# Patient Record
Sex: MALE | Race: WHITE | NOT HISPANIC OR LATINO | URBAN - METROPOLITAN AREA
[De-identification: names, ages, dates, MRNs, and addresses within clinical notes are randomized per-mention and may not be internally consistent; named-entity substitution may affect disease eponyms.]

---

## 2020-07-04 ENCOUNTER — HOSPITAL ENCOUNTER (EMERGENCY)
Facility: CLINIC | Age: 55
Discharge: HOME | End: 2020-07-04
Attending: FAMILY MEDICINE | Admitting: FAMILY MEDICINE
Payer: COMMERCIAL

## 2020-07-04 VITALS
SYSTOLIC BLOOD PRESSURE: 163 MMHG | OXYGEN SATURATION: 97 % | RESPIRATION RATE: 18 BRPM | WEIGHT: 203 LBS | DIASTOLIC BLOOD PRESSURE: 113 MMHG | HEART RATE: 95 BPM | TEMPERATURE: 97.9 F

## 2020-07-04 DIAGNOSIS — G47.00 INSOMNIA, UNSPECIFIED TYPE: ICD-10-CM

## 2020-07-04 DIAGNOSIS — F32.1 CURRENT MODERATE EPISODE OF MAJOR DEPRESSIVE DISORDER WITHOUT PRIOR EPISODE (H): ICD-10-CM

## 2020-07-04 DIAGNOSIS — F41.9 ANXIETY: ICD-10-CM

## 2020-07-04 LAB
ALBUMIN SERPL-MCNC: 4 G/DL (ref 3.4–5)
ALP SERPL-CCNC: 70 U/L (ref 40–150)
ALT SERPL W P-5'-P-CCNC: 92 U/L (ref 0–70)
AMPHETAMINES UR QL SCN: NEGATIVE
ANION GAP SERPL CALCULATED.3IONS-SCNC: 5 MMOL/L (ref 3–14)
AST SERPL W P-5'-P-CCNC: 25 U/L (ref 0–45)
BARBITURATES UR QL: NEGATIVE
BASOPHILS # BLD AUTO: 0 10E9/L (ref 0–0.2)
BASOPHILS NFR BLD AUTO: 0.4 %
BENZODIAZ UR QL: NEGATIVE
BILIRUB SERPL-MCNC: 0.7 MG/DL (ref 0.2–1.3)
BUN SERPL-MCNC: 13 MG/DL (ref 7–30)
CALCIUM SERPL-MCNC: 9 MG/DL (ref 8.5–10.1)
CANNABINOIDS UR QL SCN: NEGATIVE
CHLORIDE SERPL-SCNC: 104 MMOL/L (ref 94–109)
CO2 SERPL-SCNC: 27 MMOL/L (ref 20–32)
COCAINE UR QL: NEGATIVE
CREAT SERPL-MCNC: 0.86 MG/DL (ref 0.66–1.25)
DIFFERENTIAL METHOD BLD: NORMAL
EOSINOPHIL # BLD AUTO: 0.1 10E9/L (ref 0–0.7)
EOSINOPHIL NFR BLD AUTO: 0.5 %
ERYTHROCYTE [DISTWIDTH] IN BLOOD BY AUTOMATED COUNT: 12.4 % (ref 10–15)
ETHANOL UR QL SCN: NEGATIVE
GFR SERPL CREATININE-BSD FRML MDRD: >90 ML/MIN/{1.73_M2}
GLUCOSE SERPL-MCNC: 104 MG/DL (ref 70–99)
HCT VFR BLD AUTO: 49.5 % (ref 40–53)
HGB BLD-MCNC: 17.1 G/DL (ref 13.3–17.7)
IMM GRANULOCYTES # BLD: 0 10E9/L (ref 0–0.4)
IMM GRANULOCYTES NFR BLD: 0.4 %
LYMPHOCYTES # BLD AUTO: 1.5 10E9/L (ref 0.8–5.3)
LYMPHOCYTES NFR BLD AUTO: 15.1 %
MCH RBC QN AUTO: 31.8 PG (ref 26.5–33)
MCHC RBC AUTO-ENTMCNC: 34.5 G/DL (ref 31.5–36.5)
MCV RBC AUTO: 92 FL (ref 78–100)
MONOCYTES # BLD AUTO: 0.7 10E9/L (ref 0–1.3)
MONOCYTES NFR BLD AUTO: 6.8 %
NEUTROPHILS # BLD AUTO: 7.8 10E9/L (ref 1.6–8.3)
NEUTROPHILS NFR BLD AUTO: 76.8 %
NRBC # BLD AUTO: 0 10*3/UL
NRBC BLD AUTO-RTO: 0 /100
OPIATES UR QL SCN: NEGATIVE
PLATELET # BLD AUTO: 275 10E9/L (ref 150–450)
POTASSIUM SERPL-SCNC: 4 MMOL/L (ref 3.4–5.3)
PROT SERPL-MCNC: 7.5 G/DL (ref 6.8–8.8)
RBC # BLD AUTO: 5.37 10E12/L (ref 4.4–5.9)
SODIUM SERPL-SCNC: 136 MMOL/L (ref 133–144)
TSH SERPL DL<=0.005 MIU/L-ACNC: 2.82 MU/L (ref 0.4–4)
WBC # BLD AUTO: 10.2 10E9/L (ref 4–11)

## 2020-07-04 PROCEDURE — 85025 COMPLETE CBC W/AUTO DIFF WBC: CPT | Performed by: FAMILY MEDICINE

## 2020-07-04 PROCEDURE — 80053 COMPREHEN METABOLIC PANEL: CPT | Performed by: FAMILY MEDICINE

## 2020-07-04 PROCEDURE — 90791 PSYCH DIAGNOSTIC EVALUATION: CPT

## 2020-07-04 PROCEDURE — 80307 DRUG TEST PRSMV CHEM ANLYZR: CPT | Performed by: FAMILY MEDICINE

## 2020-07-04 PROCEDURE — 99284 EMERGENCY DEPT VISIT MOD MDM: CPT | Mod: Z6 | Performed by: FAMILY MEDICINE

## 2020-07-04 PROCEDURE — 80320 DRUG SCREEN QUANTALCOHOLS: CPT | Performed by: FAMILY MEDICINE

## 2020-07-04 PROCEDURE — 36415 COLL VENOUS BLD VENIPUNCTURE: CPT

## 2020-07-04 PROCEDURE — 99285 EMERGENCY DEPT VISIT HI MDM: CPT | Mod: 25

## 2020-07-04 PROCEDURE — 84443 ASSAY THYROID STIM HORMONE: CPT | Performed by: FAMILY MEDICINE

## 2020-07-04 RX ORDER — ESCITALOPRAM OXALATE 10 MG/1
10 TABLET ORAL DAILY
COMMUNITY
End: 2021-09-10

## 2020-07-04 RX ORDER — ATORVASTATIN CALCIUM 20 MG/1
20 TABLET, FILM COATED ORAL DAILY
COMMUNITY

## 2020-07-04 RX ORDER — LISINOPRIL 10 MG/1
20 TABLET ORAL DAILY
COMMUNITY

## 2020-07-04 RX ORDER — LORAZEPAM 0.5 MG/1
0.5 TABLET ORAL 2 TIMES DAILY PRN
COMMUNITY
End: 2021-09-10

## 2020-07-04 RX ORDER — QUETIAPINE FUMARATE 50 MG/1
50 TABLET, FILM COATED ORAL
Qty: 30 TABLET | Refills: 0 | Status: SHIPPED | OUTPATIENT
Start: 2020-07-04 | End: 2021-09-10 | Stop reason: SINTOL

## 2020-07-04 NOTE — DISCHARGE INSTRUCTIONS
Thank you for choosing [facility].     Please closely monitor for further symptoms. Return to the Emergency Department if you develop any new or worsening signs or symptoms.    If you received any opiate pain medications or sedatives during your visit, please do not drive for at least 8 hours.     Labs, cultures or final xray interpretations may still need to be reviewed.  We will call you if your plan of care needs to be changed.    Please follow up with day treatment referral, therapy, and with your primary care physician or clinic.

## 2020-07-04 NOTE — ED PROVIDER NOTES
[facility] [place] EMERGENCY DEPARTMENT ([facility])    7/04/20        History     Chief Complaint   Patient presents with     Depression     lost job 2 months ago after 26 years.     HPI Craig Wilfahrt is a 54 year old male who presents to the Emergency Department for evaluation of anxiety, insomnia, depression, and weight loss.  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  Shortly after he started experiencing a lot of anxiety, restlessness, trouble sleeping, and some depressed mood.  He takes Tylenol PM which can help him sleep for about 4 hours at times.  He has trouble falling asleep, and with nighttime waking.  He has a lot of racing thoughts.  His appetite is poor, he his wife estimates he has lost 15 pounds.  Was treated in March for sudden sensorineural hearing loss with prednisone but became unable to continue due to side effects and has planned follow-up with ENT in that regard.    DEC :  Per DEC , the patient is here with his wife (who is a nurse by training).  The patient was successful in business and he recently lost a job at the end of April which was devastating for him.  He admits to associated increasing depression and anxiety after this, he also admits to insomnia, decrease in appetite, anhedonia, and increased difficulty concentrating.  The patient is anxious with groups of people as his extroverted needs are not being met.  He believes that they are talking about him, DEC  believes this is anxiety vs psychotic features.  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  He does drink 2-3 beers a day after work, but has not done so for a week and is not worried about alcoholism.  The patient endorses feeling very hopeless about his mental health and believes that if he could fix his insomnia the majority of his problems would go away.  The patient was seen by his PCP and started on Lexapro 5  days ago he is presently seeing an outpatient therapist weekly as well.  On June 30 they went to an urgent care in [place] and he was prescribed Ativan, after this he googled Ativan and got frightened of taking it so he has since stopped.  The patient presently denies any suicidal ideation, homicidal ideation, or hallucinations.    I have reviewed the Medications, Allergies, Past Medical and Surgical History, and Social History in the Epic system.  PAST MEDICAL HISTORY:   Past Medical History:   Diagnosis Date     Hypertension        PAST SURGICAL HISTORY:   Past Surgical History:   Procedure Laterality Date     GI SURGERY      appendix-1980s     ORTHOPEDIC SURGERY      right knee surgery, jaw fracture and right temporal plate       Past medical history, past surgical history, medications, and allergies were reviewed with the patient. Additional pertinent items: None    FAMILY HISTORY: History reviewed. No pertinent family history.    SOCIAL HISTORY:   Social History     Tobacco Use     Smoking status: Never Smoker     Smokeless tobacco: Never Used   Substance Use Topics     Alcohol use: Yes     Comment: occassional-last 5 days ago     Social history was reviewed with the patient. Additional pertinent items: None      Patient's Medications   New Prescriptions    QUETIAPINE (SEROQUEL) 50 MG TABLET    Take 1 tablet (50 mg) by mouth nightly as needed .  May take 2 tablets (100 mg) if needed.   Previous Medications    ATORVASTATIN (LIPITOR) 20 MG TABLET    Take 20 mg by mouth daily    ESCITALOPRAM (LEXAPRO) 10 MG TABLET    Take 10 mg by mouth daily    LISINOPRIL (ZESTRIL) 10 MG TABLET    Take 10 mg by mouth daily    LORAZEPAM (ATIVAN) 0.5 MG TABLET    Take 0.5 mg by mouth 2 times daily as needed for anxiety   Modified Medications    No medications on file   Discontinued Medications    No medications on file        No Known Allergies     Review of Systems  ROS: 14 point ROS neg other than the symptoms noted above in the  HPI.    Physical Exam   BP: (!) 163/113  Pulse: 95  Temp: 97.9  F (36.6  C)  Resp: 18  Weight: 92.1 kg (203 lb)  SpO2: 97 %      Physical Exam  Constitutional:       General: He is not in acute distress.     Appearance: He is not diaphoretic.   HENT:      Head: Atraumatic.   Eyes:      General: No scleral icterus.     Pupils: Pupils are equal, round, and reactive to light.   Cardiovascular:      Heart sounds: Normal heart sounds.   Pulmonary:      Effort: No respiratory distress.      Breath sounds: Normal breath sounds.   Abdominal:      General: Bowel sounds are normal.      Palpations: Abdomen is soft.      Tenderness: There is no abdominal tenderness.   Musculoskeletal:         General: No tenderness.   Skin:     General: Skin is warm.      Findings: No rash.   Psychiatric:         Attention and Perception: Attention normal.         Mood and Affect: Mood is anxious and depressed.         Speech: Speech normal.         Behavior: Behavior normal.         Thought Content: Thought content is not paranoid or delusional. Thought content does not include homicidal or suicidal ideation.         Cognition and Memory: Cognition normal.         Judgment: Judgment normal.         ED Course        Procedures                           Results for orders placed or performed during the hospital encounter of 07/04/20 (from the past 24 hour(s))   Comprehensive metabolic panel   Result Value Ref Range    Sodium 136 133 - 144 mmol/L    Potassium 4.0 3.4 - 5.3 mmol/L    Chloride 104 94 - 109 mmol/L    Carbon Dioxide 27 20 - 32 mmol/L    Anion Gap 5 3 - 14 mmol/L    Glucose 104 (H) 70 - 99 mg/dL    Urea Nitrogen 13 7 - 30 mg/dL    Creatinine 0.86 0.66 - 1.25 mg/dL    GFR Estimate >90 >60 mL/min/[1.73_m2]    GFR Estimate If Black >90 >60 mL/min/[1.73_m2]    Calcium 9.0 8.5 - 10.1 mg/dL    Bilirubin Total 0.7 0.2 - 1.3 mg/dL    Albumin 4.0 3.4 - 5.0 g/dL    Protein Total 7.5 6.8 - 8.8 g/dL    Alkaline Phosphatase 70 40 - 150 U/L    ALT  92 (H) 0 - 70 U/L    AST 25 0 - 45 U/L   CBC with platelets differential   Result Value Ref Range    WBC 10.2 4.0 - 11.0 10e9/L    RBC Count 5.37 4.4 - 5.9 10e12/L    Hemoglobin 17.1 13.3 - 17.7 g/dL    Hematocrit 49.5 40.0 - 53.0 %    MCV 92 78 - 100 fl    MCH 31.8 26.5 - 33.0 pg    MCHC 34.5 31.5 - 36.5 g/dL    RDW 12.4 10.0 - 15.0 %    Platelet Count 275 150 - 450 10e9/L    Diff Method Automated Method     % Neutrophils 76.8 %    % Lymphocytes 15.1 %    % Monocytes 6.8 %    % Eosinophils 0.5 %    % Basophils 0.4 %    % Immature Granulocytes 0.4 %    Nucleated RBCs 0 0 /100    Absolute Neutrophil 7.8 1.6 - 8.3 10e9/L    Absolute Lymphocytes 1.5 0.8 - 5.3 10e9/L    Absolute Monocytes 0.7 0.0 - 1.3 10e9/L    Absolute Eosinophils 0.1 0.0 - 0.7 10e9/L    Absolute Basophils 0.0 0.0 - 0.2 10e9/L    Abs Immature Granulocytes 0.0 0 - 0.4 10e9/L    Absolute Nucleated RBC 0.0    TSH with free T4 reflex   Result Value Ref Range    TSH 2.82 0.40 - 4.00 mU/L   Drug abuse screen 6 urine (chem dep)   Result Value Ref Range    Amphetamine Qual Urine Negative NEG^Negative    Barbiturates Qual Urine Negative NEG^Negative    Benzodiazepine Qual Urine Negative NEG^Negative    Cannabinoids Qual Urine Negative NEG^Negative    Cocaine Qual Urine Negative NEG^Negative    Ethanol Qual Urine Negative NEG^Negative    Opiates Qualitative Urine Negative NEG^Negative     Medications - No data to display          Assessments & Plan (with Medical Decision Making)   54-year-old male who has recently been diagnosed with depression and anxiety presenting now due to worsening mental health symptoms.  Multiple stressors including loss of job and increasing insomnia.  The patient was also seen by the [facility] , please refer to their extensive note/evaluation which was reviewed with me and is documented in EPIC on 7/4/2020 for further details.  In the ED he was cooperative, pleasant, and appeared medically stable.  Diagnostic studies unremarkable.   There are no substance abuse issues.  He endorses significant problems with anxiety and depressive symptoms.  Only recently started Lexapro, and as yet is unlikely to accurately reflect the potential effect of this medication.  He denies feeling suicidal or having self-harm thoughts or symptoms of psychosis.  He appears appropriate for outpatient management and a day treatment referral was made.  We will start Seroquel at bedtime for both sleep and anxiety.  Patient appears stable to be discharged home.  We discussed the indications for emergency department return and follow-up.  Stable for discharge.      I have reviewed the nursing notes.    I have reviewed the findings, diagnosis, plan and need for follow up with the patient.    New Prescriptions    QUETIAPINE (SEROQUEL) 50 MG TABLET    Take 1 tablet (50 mg) by mouth nightly as needed .  May take 2 tablets (100 mg) if needed.       Final diagnoses:   Current moderate episode of major depressive disorder without prior episode (H)   Anxiety   Insomnia, unspecified type       7/4/2020   [facility], [place], EMERGENCY DEPARTMENT    I, Mer Pope, am serving as a trained medical scribe to document services personally performed by Tim Powell MD, based on the provider's statements to me.     ITim MD, was physically present and have reviewed and verified the accuracy of this note documented by Mer oPpe.       Tim Powell MD  07/04/20 [id number]

## 2020-07-04 NOTE — ED TRIAGE NOTES
Pt has been feeling depressed and anxious for last 2 months. Insomnia as well. All started after loosing job 2 months.

## 2020-07-04 NOTE — ED AVS SNAPSHOT
[facility], [place], Emergency Department  [address]  [facility] [address]  Phone:  [phone]  Fax:  [phone]                                    Craig Wilfahrt   MRN: [id number]    Department:  [facility], Emergency Department   Date of Visit:  7/4/2020           After Visit Summary Signature Page    I have received my discharge instructions, and my questions have been answered. I have discussed any challenges I see with this plan with the nurse or doctor.    ..........................................................................................................................................  Patient/Patient Representative Signature      ..........................................................................................................................................  Patient Representative Print Name and Relationship to Patient    ..................................................               ................................................  Date                                   Time    ..........................................................................................................................................  Reviewed by Signature/Title    ...................................................              ..............................................  Date                                               Time          22EPIC Rev 08/18

## 2020-07-06 ENCOUNTER — HOSPITAL ENCOUNTER (OUTPATIENT)
Dept: BEHAVIORAL HEALTH | Facility: CLINIC | Age: 55
Discharge: HOME | End: 2020-07-06
Attending: PSYCHIATRY & NEUROLOGY | Admitting: PSYCHIATRY & NEUROLOGY
Payer: COMMERCIAL

## 2020-07-06 DIAGNOSIS — F43.23 ADJUSTMENT DISORDER WITH MIXED ANXIETY AND DEPRESSED MOOD: ICD-10-CM

## 2020-07-06 PROCEDURE — 90791 PSYCH DIAGNOSTIC EVALUATION: CPT | Mod: TEL | Performed by: PSYCHOLOGIST

## 2020-07-06 ASSESSMENT — COLUMBIA-SUICIDE SEVERITY RATING SCALE - C-SSRS
TOTAL  NUMBER OF ABORTED OR SELF INTERRUPTED ATTEMPTS PAST LIFETIME: NO
ATTEMPT PAST THREE MONTHS: NO
4. HAVE YOU HAD THESE THOUGHTS AND HAD SOME INTENTION OF ACTING ON THEM?: NO
TOTAL  NUMBER OF INTERRUPTED ATTEMPTS PAST 3 MONTHS: NO
6. HAVE YOU EVER DONE ANYTHING, STARTED TO DO ANYTHING, OR PREPARED TO DO ANYTHING TO END YOUR LIFE?: NO
2. HAVE YOU ACTUALLY HAD ANY THOUGHTS OF KILLING YOURSELF?: NO
TOTAL  NUMBER OF ABORTED OR SELF INTERRUPTED ATTEMPTS PAST 3 MONTHS: NO
2. HAVE YOU ACTUALLY HAD ANY THOUGHTS OF KILLING YOURSELF LIFETIME?: NO
1. IN THE PAST MONTH, HAVE YOU WISHED YOU WERE DEAD OR WISHED YOU COULD GO TO SLEEP AND NOT WAKE UP?: NO
3. HAVE YOU BEEN THINKING ABOUT HOW YOU MIGHT KILL YOURSELF?: NO
TOTAL  NUMBER OF INTERRUPTED ATTEMPTS LIFETIME: NO
ATTEMPT LIFETIME: NO
1. IN THE PAST MONTH, HAVE YOU WISHED YOU WERE DEAD OR WISHED YOU COULD GO TO SLEEP AND NOT WAKE UP?: NO
5. HAVE YOU STARTED TO WORK OUT OR WORKED OUT THE DETAILS OF HOW TO KILL YOURSELF? DO YOU INTEND TO CARRY OUT THIS PLAN?: NO
6. HAVE YOU EVER DONE ANYTHING, STARTED TO DO ANYTHING, OR PREPARED TO DO ANYTHING TO END YOUR LIFE?: NO
5. HAVE YOU STARTED TO WORK OUT OR WORKED OUT THE DETAILS OF HOW TO KILL YOURSELF? DO YOU INTEND TO CARRY OUT THIS PLAN?: NO
4. HAVE YOU HAD THESE THOUGHTS AND HAD SOME INTENTION OF ACTING ON THEM?: NO

## 2020-07-06 ASSESSMENT — ANXIETY QUESTIONNAIRES
2. NOT BEING ABLE TO STOP OR CONTROL WORRYING: SEVERAL DAYS
7. FEELING AFRAID AS IF SOMETHING AWFUL MIGHT HAPPEN: NOT AT ALL
1. FEELING NERVOUS, ANXIOUS, OR ON EDGE: SEVERAL DAYS
3. WORRYING TOO MUCH ABOUT DIFFERENT THINGS: MORE THAN HALF THE DAYS
IF YOU CHECKED OFF ANY PROBLEMS ON THIS QUESTIONNAIRE, HOW DIFFICULT HAVE THESE PROBLEMS MADE IT FOR YOU TO DO YOUR WORK, TAKE CARE OF THINGS AT HOME, OR GET ALONG WITH OTHER PEOPLE: SOMEWHAT DIFFICULT
GAD7 TOTAL SCORE: 7
6. BECOMING EASILY ANNOYED OR IRRITABLE: NOT AT ALL
5. BEING SO RESTLESS THAT IT IS HARD TO SIT STILL: SEVERAL DAYS

## 2020-07-06 ASSESSMENT — PATIENT HEALTH QUESTIONNAIRE - PHQ9
SUM OF ALL RESPONSES TO PHQ QUESTIONS 1-9: 13
5. POOR APPETITE OR OVEREATING: MORE THAN HALF THE DAYS

## 2020-07-06 NOTE — PROGRESS NOTES
"[facility]  Evaluator Name:  Hoda Mtz   Credentials:  PSYD HUMERA    PATIENT'S NAME: Craig Wilfahrt  PREFERRED NAME: Rehan  PREFERRED PRONOUNS: he/him/his     MRN:   [id number]  :   1965   ACCT. NUMBER: [id number]  DATE OF SERVICE: 20  START TIME: 1100  END TIME: 1230  PREFERRED PHONE: [phone]  May we leave a program related message: Yes  Service Modality:  Phone Visit:    The patient has been notified of the following:      \"We have found that certain health care needs can be provided without the need for a face to face visit.  This service lets us provide the care you need with a phone conversation.       I will have full access to your [place] medical record during this entire phone call.   I will be taking notes for your medical record.      Since this is like an office visit, we will bill your insurance company for this service.       There are potential benefits and risks of telephone visits (e.g. limits to patient confidentiality) that differ from in-person visits.?  Confidentiality still applies for telephone services, and nobody will record the visit.  It is important to be in a quiet, private space that is free of distractions (including cell phone or other devices) during the visit.??      If during the course of the call I believe a telephone visit is not appropriate, you will not be charged for this service\"     Consent has been obtained for this service by care team member: Yes     STANDARD ADULT DIAGNOSTIC ASSESSMENT      Identifying Information:  Patient is a 54 year old, .  The pronoun use throughout this assessment reflects the patient's chosen pronoun.  Patient was referred for an assessment by [facility].  Patient attended the session alone.     Chief Complaint:   The reason for seeking services at this time is: \" depression due to job loss 2 months ago, with that I have anxiety \"   The problem(s) began a few months ago,  Lost my job, was on " "prednisone for an ear nerve problem and was \"getting edgy\".  Not on the prednisone any longer. He said he lost a job of 26 years.  He said it was a shock and did not see it coming.  He said he was laid off with 300 other people. 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  He said he was not taking care of himself or chores at home.  He said he does not believe he was able to grieve the loss of his identity with his job.  He said he felt out of sorts and was not taking care of himself.   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    Patient has not attempted to resolve these concerns in the past.    Social/Family History:  Patient reported they grew up in [place].  They were raised by biological parents.   Mother passed away and father is still alive at age 95.  He said he has 5 siblings   Patient reported that he/him/his   childhood was great upbringing.  Father ran a business.  Patient described their current relationships with family of origin as good.  He said he has not reached out to them for support on this but feels like he could .      The patient describes their cultural background as .  Cultural influences and impact on patient's life structure, values, norms, and healthcare: grew up in [place].  Contextual influences on patient's health include: Family Factors good support from family.    These factors will be addressed in the Preliminary Treatment plan.  Patient identified their preferred language to be English. Patient reported they does not need the assistance of an  or other support involved in therapy.     Patient reported had no significant delays in developmental tasks.   Patient's highest " education level was graduate school. Master's Degree in Business Administration.  Patient identified the following learning problems: none reported.  Modifications will not be used to assist communication in therapy.   Patient reports they are  able to understand written materials.    Patient reported the following relationship history  1x.  Patient's current relationship status is  for 21.   Patient identified their sexual orientation as heterosexual.  Patient reported having two child(yuri). Daughter 18, son 16. Patient identified friends, spouse and neighbors as part of their support system.  Patient identified the quality of these relationships as stable and meaningful.      Patient's current living/housing situation involves staying in own home/apartment.  They live with wife and children and they report that housing is stable.     Patient is currently unemployed.  Patient reports their finances are obtained through spouse. Has a severance package. Patient does not identify finances as a current stressor.  Worries about not having a job and being out of a routine and losing connection with people.      Patient reported that they have not been involved with the legal system.   Patient denies being on probation / parole / under the jurisdiction of the court.        Patient's Strengths and Limitations:  Patient identified the following strengths or resources that will help them succeed in treatment: friends / good social support, family support, insight, intelligence and work ethic. Things that may interfere with the patient's success in treatment include: none identified.   _______________________________________________  Personal and Family Medical History:   Patient did report a family history of mental health concerns.  Patient reports family history includes Substance Abuse in his brother..     Patient reported the following previous diagnoses which include(s): none reported.  Patient reported  symptoms began a few months ago.   Patient has not received mental health services in the past: none.  Psychiatric Hospitalizations: None.  Patient denies a history of civil commitment.  Currently, patient is not receiving other mental health services.  These include none.   Patient has had a physical exam to rule out medical causes for current symptoms.  Date of last physical exam was within the past year. Client was encouraged to follow up with PCP if symptoms were to develop. The patient has a non-[place] Primary Care Provider. Their PCP is Dr. Keshav Trevizo  [facility]..  Patient reports the following current medical concerns: high blood pressure.nerve pain in ear.  There are significant appetite / nutritional concerns / weight changes. Has lost 16 pounds in the past 2 months.  Patient does not report a history of head injury / trauma / cognitive impairment.      Patient reports current meds as:   Outpatient Medications Marked as Taking for the 7/6/20 encounter (Hospital Encounter) with [facility]   Medication Sig     atorvastatin (LIPITOR) 20 MG tablet Take 20 mg by mouth daily     escitalopram (LEXAPRO) 10 MG tablet Take 10 mg by mouth daily     lisinopril (ZESTRIL) 10 MG tablet Take 10 mg by mouth daily     LORazepam (ATIVAN) 0.5 MG tablet Take 0.5 mg by mouth 2 times daily as needed for anxiety     QUEtiapine (SEROQUEL) 50 MG tablet Take 1 tablet (50 mg) by mouth nightly as needed .  May take 2 tablets (100 mg) if needed.       Medication Adherence:  Patient reports taking prescribed medications as prescribed.    Patient Allergies:  No Known Allergies    Medical History:    Past Medical History:   Diagnosis Date     Hypertension          Current Mental Status Exam:   Appearance:  Unable to assess due to telephone assessment  Eye Contact:  Unable to assess due to telephone assessment  Psychomotor:  Unable to assess due to telephone assessment      Gait / station:  Unable to assess due to  telephone assessment  Attitude / Demeanor: Cooperative   Speech      Rate / Production: Normal/ Responsive      Volume:  Normal  volume      Language:  no problems  Mood:   Anxious   Affect:   Unable to assess due to telephone assessment  Thought Content: Clear   Thought Process: Blocking  Goal Directed       Associations: No loosening of associations  Insight:   Good   Judgment:  Intact   Orientation:  All  Attention/concentration: Good    Rating Scales:    PHQ9:    PHQ-9 SCORE 7/6/2020   PHQ-9 Total Score 13   ;    GAD7:    JOSEPH-7 SCORE 7/6/2020   Total Score 7     CGI:     First:Considering your total clinical experience with this particular patient population, how severe are the patient's symptoms at this time?: 5 (7/6/2020 11:13 AM)  ;    Most recentCompared to the patient's condition at the START of treatment, this patient's condition is: 4 (7/6/2020 11:13 AM)      Substance Use:  Patient did report a family history of substance use concerns; see medical history section for details.  Patient has not received chemical dependency treatment in the past.  Patient has not ever been to detox.      Patient is not currently receiving any chemical dependency treatment. Patient reported the following problems as a result of their substance use: none reported.    Patient reports he was drinking 3-4 beers a day for the past few months. He said the beer was helping with anxiety but he has not drank in the past few weeks.    Patient denies using tobacco.  Patient reports marijuana use occasionally in the college.  None since college.  Patient reports 2-3 cups of coffee per day.  He said this has been the patter for years without disruption of sleep.  Patient reports using/abusing the following substance(s). Patient reported no other substance use.     CAGE- AID:   1. No  2. No  3. No  4. No  Substance Use: No symptoms    Based on the negative CAGE score and clinical interview there  are not indications of drug or alcohol  abuse.      Significant Losses / Trauma / Abuse / Neglect Issues:   Patient did not serve in the .  There are indications or report of significant loss, trauma, abuse or neglect issues related to: job loss job loss 2 months ago.  Concerns for possible neglect are not present.     Safety Assessment: Pt denies ever having suicidal thoughts.    Current Safety Concerns:  [place] Suicide Severity Rating Scale (Lifetime/Recent)  [place] Suicide Severity Rating (Lifetime/Recent) 7/6/2020   1. Wish to be Dead (Lifetime) No   1. Wish to be Dead (Recent) No   2. Non-Specific Active Suicidal Thoughts (Lifetime) No   2. Non-Specific Active Suicidal Thoughts (Recent) No   3. Active Suicidal Ideation with any Methods (Not Plan) Without Intent to Act (Lifetime) No   3. Active Sucidal Ideation with any Methods (Not Plan) Without Intent to Act (Recent) No   4. Active Suicidal Ideation with Some Intent to Act, Without Specific Plan (Lifetime) No   4. Active Suicidal Ideation with Some Intent to Act, Without Specific Plan (Recent) No   5. Active Suicidal Ideation with Specific Plan and Intent (Lifetime) No   5. Active Suicidal Ideation with Specific Plan and Intent (Recent) No   Most Severe Ideation Rating (Lifetime) NA   Frequency (Lifetime) NA   Duration (Lifetime) NA   Controllability (Lifetime) NA   Protective Factors  (Lifetime) NA   Reasons for Ideation (Lifetime) NA   Most Severe Ideation Rating (Past Month) NA   Frequency (Past Month) NA   Duration (Past Month) NA   Controllability (Past Month) NA   Protective Factors (Past Month) NA   Reasons for Ideation (Past Month) NA   Actual Attempt (Lifetime) No   Actual Attempt (Past 3 Months) No   Has subject engaged in non-suicidal self-injurious behavior? (Lifetime) No   Has subject engaged in non-suicidal self-injurious behavior? (Past 3 Months) No   Interrupted Attempts (Lifetime) No   Interrupted Attempts (Past 3 Months) No   Aborted or Self-Interrupted Attempt  (Lifetime) No   Aborted or Self-Interrupted Attempt (Past 3 Months) No   Preparatory Acts or Behavior (Lifetime) No   Preparatory Acts or Behavior (Past 3 Months) No     Patient denies current homicidal ideation and behaviors.  Patient denies current self-injurious ideation and behaviors.    Patient denied risk behaviors associated with substance use.  Patient denies any high risk behaviors associated with mental health symptoms.  Patient reports the following current concerns for their personal safety: None.  Patient reports there are firearms in the house. The firearms are secured in a locked space.     History of Safety Concerns:  Patient denied a history of homicidal ideation.     Patient denied a history of personal safety concerns.    Patient denied a history of assaultive behaviors.    Patient denied a history of sexual assault behaviors.     Patient denied a history of risk behaviors associated with substance use.  Patient denies any history of high risk behaviors associated with mental health symptoms.  Patient reports the following protective factors: dedication to family/friends, effective problem-solving skills and committment to well-being    Risk Plan:  See Preliminary Treatment Plan for Safety and Risk Management Plan     Review of Symptoms per patient report:  Depression: Change in sleep, Lack of interest, Change in energy level, Difficulties concentrating, Low self-worth and Feeling sad, down, or depressed  Chelo:  No Symptoms  Psychosis: No Symptoms  Anxiety: Excessive worry, Nervousness, Physical complaints, such as headaches, stomachaches, muscle tension, Sleep disturbance and Poor concentration  Panic:  No symptoms  Post Traumatic Stress Disorder:  No Symptoms   Eating Disorder: Weight change and said he has been losing weight since he lost his job--16 pounds in the last 2 months  he said part of it was the job he was working--high paced and just was not eating because he was so busy  ADD /  ADHD:  No symptoms  Conduct Disorder: No symptoms  Autism Spectrum Disorder: No symptoms  Obsessive Compulsive Disorder: No Symptoms    Patient reports the following compulsive behaviors and treatment history: none.      Diagnostic Criteria:   A. The development of emotional or behavioral symptoms in response to an identifiable stressor(s) occurring within 3 months of the onset of the stressor(s)  B. These symptoms or behaviors are clinically significant, as evidenced by one or both of the following:       - Significant impairment in social, occupational, or other important areas of functioning  C. The stress-related disturbance does not meet criteria for another disorder & is not not an exacerbation of another mental disorder  D. The symptoms do not represent normal bereavement  E. Once the stressor or its consequences have terminated, the symptoms do not persist for more than an additional 6 months       * Adjustment Disorder with Mixed Anxiety and Depressed Mood: The predominant manifestation is a combination of depression and anxiety    Functional Status:  Patient reports the following functional impairments: management of the household and or completion of tasks.     WHODAS:   WHODAS 2.0 Total Score 7/6/2020   Total Score 24       Clinical Summary:  1. Reason for assessment: Pt referred from [facility] for assessment after presenting for services in  ED  .  2. Psychosocial, Cultural and Contextual Factors: Lost job to lay off in last 2 months, decrease connection due to COVID  .  3. Principal DSM5 Diagnoses  (Sustained by DSM5 Criteria Listed Above):   Adjustment Disorders  309.28 (F43.23) With mixed anxiety and depressed mood.  4. Other Diagnoses that is relevant to services:   none  5. Provisional Diagnosis:  No other symptoms were reported during the assessment that would indicate alternate diagnoses.  Should symptoms arise during the course of treatment the diagnoses can be updated at that time.  6. Prognosis:  Return to Normal Functioning.  7. Likely consequences of symptoms if not treated: Without treatment patient more than likely will experience a continuation of symptoms with decreased daily functioning, requiring an increased level of care..  8. Client strengths include:  in the past confidence, leadership,  good communicator, considerate,  integrity.     Recommendations:     1. Plan for Safety and Risk Management:Recommended that patient call 911 or go to the local ED should there be a change in any of these risk factors..  Report to child / adult protection services was NA.     2. Patient identified no cultural or spiritual concerns for treatment services. Patient encouraged to ask for help should needs arise in the course of treatment.      3. Initial Treatment will focus on: Adjustment Difficulties related to: recent job loss.     4. Resources/Service Plan:       services are not indicated.     Modifications to assist communication are not indicated.     Additional disability accommodations are not indicated.      5. Collaboration:  Collaboration / coordination of treatment will be initiated with the following support professionals: outpatient therapist.    Pt was referred by [facility] for day treatment services.  Pt has no previous history of mental health services.  His problems started 2 months ago as a reaction to a stressor.  He is experiencing stress and grief due to job loss.  He would like to talk to someone to process the loss of his job and help keep him accountable to moving forward in his life.  He agreed that talking to someone individually to start his services would be helpful.  Should his symptoms not resolve he may benefit from outpatient day treatment services.  6.  Referrals:  The following referral(s) will be initiated: outpatient therapy. Next Scheduled Appointment: [facility].  A Release of Information has been obtained for the following: emergency contact.    7. HEAVEN: HEAVEN:  Discussed the  general effects of drugs and alcohol on health and well-being.     8. Records were reviewed at time of assessment.  Information in this assessment was obtained from the medical record and provided by patient who is a good historian.   Patient will have open access to their mental health medical record.      Eval type:  Mental Health    Staff Name/Credentials:  Hoda Mtz PSYD, LP  July 6, 2020

## 2020-07-07 ASSESSMENT — ANXIETY QUESTIONNAIRES: GAD7 TOTAL SCORE: 7

## 2020-07-16 ENCOUNTER — VIRTUAL VISIT (OUTPATIENT)
Dept: PSYCHOLOGY | Facility: CLINIC | Age: 55
End: 2020-07-16
Payer: COMMERCIAL

## 2020-07-16 DIAGNOSIS — F43.23 ADJUSTMENT DISORDER WITH MIXED ANXIETY AND DEPRESSED MOOD: Primary | ICD-10-CM

## 2020-07-16 PROCEDURE — 99207 ZZC NO BILLABLE SERVICE THIS VISIT: CPT | Mod: 95 | Performed by: MARRIAGE & FAMILY THERAPIST

## 2020-07-16 ASSESSMENT — ANXIETY QUESTIONNAIRES
7. FEELING AFRAID AS IF SOMETHING AWFUL MIGHT HAPPEN: NOT AT ALL
5. BEING SO RESTLESS THAT IT IS HARD TO SIT STILL: NOT AT ALL
IF YOU CHECKED OFF ANY PROBLEMS ON THIS QUESTIONNAIRE, HOW DIFFICULT HAVE THESE PROBLEMS MADE IT FOR YOU TO DO YOUR WORK, TAKE CARE OF THINGS AT HOME, OR GET ALONG WITH OTHER PEOPLE: SOMEWHAT DIFFICULT
2. NOT BEING ABLE TO STOP OR CONTROL WORRYING: SEVERAL DAYS
3. WORRYING TOO MUCH ABOUT DIFFERENT THINGS: SEVERAL DAYS
4. TROUBLE RELAXING: SEVERAL DAYS
6. BECOMING EASILY ANNOYED OR IRRITABLE: NOT AT ALL
1. FEELING NERVOUS, ANXIOUS, OR ON EDGE: SEVERAL DAYS
GAD7 TOTAL SCORE: 4

## 2020-07-16 ASSESSMENT — PATIENT HEALTH QUESTIONNAIRE - PHQ9: SUM OF ALL RESPONSES TO PHQ QUESTIONS 1-9: 10

## 2020-07-16 NOTE — PATIENT INSTRUCTIONS
Patient's goals for the next week include:    1. Continue establishing a routine and getting to the gym in the morning  2. Continue spending quality time with family (including dogs)    Patient's homework: complete one random act of kindness

## 2020-07-16 NOTE — PROGRESS NOTES
"               Progress Note - Initial Session    Client Name:  Craig Wilfahrt Date: 7/16/2020         Service Type: Individual     Session Start Time: 9:40 am  Session End Time: 10:40 am     Session Length: 60 minutes    Session #: 1    Attendees: Client attended alone    Service Modality:  Video Visit:    Telemedicine Visit: The patient's condition can be safely assessed and treated via synchronous audio and visual telemedicine encounter.      Reason for Telemedicine Visit: Services only offered telehealth    Originating Site (Patient Location): Patient's home    Distant Site (Provider Location): [facility]: [place]    Consent:  The patient/guardian has verbally consented to: the potential risks and benefits of telemedicine (video visit) versus in person care; bill my insurance or make self-payment for services provided; and responsibility for payment of non-covered services.     Patient would like the video invitation sent by: Send to e-mail at: [email or web address]     Mode of Communication:  Video Conference via [employer]    As the provider I attest to compliance with applicable laws and regulations related to telemedicine.       DATA:  Diagnostic Assessment in progress.  Unable to complete documentation at the conclusion of the first session due to needing additional documentation time.    Interactive Complexity: No  Crisis: No    Intervention:  CBT: connect thoughts, feelings, and actions  Solution Focused: identify solvable problems and generate possible solutions  Narrative Therapy: identify bright spots, the \"story I'm making up\" and  the problem from the person    ASSESSMENT:  Mental Status Assessment:  Appearance:   Appropriate   Eye Contact:   Good   Psychomotor Behavior: Normal   Attitude:   Cooperative  Interested Pleasant Attentive  Orientation:   All  Speech   Rate / Production: Normal/ Responsive Talkative   Volume:  Normal   Mood:    Depressed  Sad  " Grieving  Affect:    Restricted  Subdued   Thought Content:  Rumination   Thought Form:  Coherent   Insight:    Good  and Intellectual Insight      Safety Issues and Plan for Safety and Risk Management:   [place] Suicide Severity Rating Scale (Lifetime/Recent)  [place] Suicide Severity Rating (Lifetime/Recent) 7/17/2020   1. Wish to be Dead (Lifetime) No   1. Wish to be Dead (Recent) No   2. Non-Specific Active Suicidal Thoughts (Lifetime) No   2. Non-Specific Active Suicidal Thoughts (Recent) No   Actual Attempt (Lifetime) No   Actual Attempt (Past 3 Months) No   Has subject engaged in non-suicidal self-injurious behavior? (Lifetime) No   Has subject engaged in non-suicidal self-injurious behavior? (Past 3 Months) No   Some encounter information is confidential and restricted. Go to Review Flowsheets activity to see all data.     Patient denies current fears or concerns for personal safety.  Patient denies current or recent suicidal ideation or behaviors.  Patient denies current or recent homicidal ideation or behaviors.  Patient denies current or recent self injurious behavior or ideation.  Patient denies other safety concerns.  Recommended that patient call 911 or go to the local ED should there be a change in any of these risk factors.  Patient reports there are firearms in the house. The firearms are secured in a locked space.     Diagnostic Criteria:  A. The development of emotional or behavioral symptoms in response to an identifiable stressor(s) occurring within 3 months of the onset of the stressor(s)  B. These symptoms or behaviors are clinically significant, as evidenced by one or both of the following:  C. The stress-related disturbance does not meet criteria for another disorder & is not not an exacerbation of another mental disorder  D. The symptoms do not represent normal bereavement  E. 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 Go to Review Flowsheets activity to see all data.       Collateral Reports Completed:  Routed note to PCP      PLAN: (Homework, other):  Patient's goals for the next week include:    1. Continue establishing a routine and getting to the gym in the morning  2. Continue spending quality time with family (including dogs)    Patient's homework: complete one random act of kindness        Perla Sarmiento  July 16, 2020

## 2020-07-17 ASSESSMENT — COLUMBIA-SUICIDE SEVERITY RATING SCALE - C-SSRS
2. HAVE YOU ACTUALLY HAD ANY THOUGHTS OF KILLING YOURSELF LIFETIME?: NO
1. IN THE PAST MONTH, HAVE YOU WISHED YOU WERE DEAD OR WISHED YOU COULD GO TO SLEEP AND NOT WAKE UP?: NO
ATTEMPT LIFETIME: NO
1. IN THE PAST MONTH, HAVE YOU WISHED YOU WERE DEAD OR WISHED YOU COULD GO TO SLEEP AND NOT WAKE UP?: NO
2. HAVE YOU ACTUALLY HAD ANY THOUGHTS OF KILLING YOURSELF?: NO
ATTEMPT PAST THREE MONTHS: NO

## 2020-07-17 ASSESSMENT — ANXIETY QUESTIONNAIRES: GAD7 TOTAL SCORE: 4

## 2020-07-23 ENCOUNTER — VIRTUAL VISIT (OUTPATIENT)
Dept: PSYCHOLOGY | Facility: CLINIC | Age: 55
End: 2020-07-23
Payer: COMMERCIAL

## 2020-07-23 DIAGNOSIS — F43.23 ADJUSTMENT DISORDER WITH MIXED ANXIETY AND DEPRESSED MOOD: Primary | ICD-10-CM

## 2020-07-23 PROCEDURE — 90837 PSYTX W PT 60 MINUTES: CPT | Mod: 95 | Performed by: MARRIAGE & FAMILY THERAPIST

## 2020-07-23 NOTE — PROGRESS NOTES
Progress Note    Patient Name: Craig Wilfahrt  Date: 7/23/2020         Service Type: Individual      Session Start Time: 1:34 pm  Session End Time: 2:36 pm     Session Length: 62 minutes    Session #: 2    Attendees: Client attended alone    Service Modality:  Video Visit:    Telemedicine Visit: The patient's condition can be safely assessed and treated via synchronous audio and visual telemedicine encounter.      Reason for Telemedicine Visit: Services only offered telehealth    Originating Site (Patient Location): Patient's home    Distant Site (Provider Location): [facility]: [place]    Consent:  The patient/guardian has verbally consented to: the potential risks and benefits of telemedicine (video visit) versus in person care; bill my insurance or make self-payment for services provided; and responsibility for payment of non-covered services.     Patient would like the video invitation sent by: Send to e-mail at: [email or web address]    Mode of Communication:  Video Conference via [employer]well    As the provider I attest to compliance with applicable laws and regulations related to telemedicine.     Treatment Plan Last Reviewed: in development  PHQ-9 / JOSEPH-7 : 7/16/20    DATA  Interactive Complexity: No  Crisis: No       Progress Since Last Session (Related to Symptoms / Goals / Homework):   Symptoms: No change per patient's report, although he admits that he has been going to the gym (4x in past week) and connected with 7 friends via phone    Homework: Achieved / completed to satisfaction - patient talked with ill neighbor      Episode of Care Goals: Minimal progress - ACTION (Actively working towards change); Intervened by reinforcing change plan / affirming steps taken     Current / Ongoing Stressors and Concerns:   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, 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; patient worked as  for a month but  "quit as it became too much; patient has severance package and is working on applying to other jobs but is grieving loss of identity and status; loves his dogs and hunts; former hockey  and just resumed gym routine; has two kids (19 & 17) and is      Treatment Objective(s) Addressed in This Session:   identify how avoidance/minimization contributes to the avoidance of feeling associated with the loss  Identify negative self-talk and behaviors: challenge core beliefs, myths, and actions  exercise for 30 minutes at least 3 times per week  Discussed self-compassion and vulnerability       Intervention:   CBT: connect thoughts, feelings, anc actions  Psychodynamic: family systems  Solution Focused: identify solvable problems and generate possible solutions  Narrative Therapy: bright spots in story, \"the story I'm telling myself\"        ASSESSMENT: Current Emotional / Mental Status (status of significant symptoms):   Risk status (Self / Other harm or suicidal ideation)   Patient denies current fears or concerns for personal safety.   Patient denies current or recent suicidal ideation or behaviors.   Patient denies current or recent homicidal ideation or behaviors.   Patient denies current or recent self injurious behavior or ideation.   Patient denies other safety concerns.   Patient reports there has been no change in risk factors since their last session.     Patient reports there has been no change in protective factors since their last session.     Recommended that patient call 911 or go to the local ED should there be a change in any of these risk factors.     Appearance:   Appropriate    Eye Contact:   Fair    Psychomotor Behavior: Normal  Restless    Attitude:   Cooperative  Pleasant   Orientation:   All   Speech    Rate / Production: Normal/ Responsive    Volume:  Normal    Mood:    Agitated Apathetic   Affect:    Blunted  Subdued    Thought Content:  Clear  Rumination  slight paranoia (\"sometimes I " "drive and think a car is following me\")   Thought Form:  Coherent  Goal Directed    Insight:    Good      Medication Review:   No changes to current psychiatric medication(s)     Medication Compliance:   Yes     Changes in Health Issues:   Yes: Pain, No Psychological Distress - in ear; patient had a PCP appt. for a second injection to improve his hearing but declined to go because his ear was hurting.  Pt states that he will go again next week if his ear feels better.     Chemical Use Review:   Substance Use: Chemical use reviewed, no active concerns identified      Tobacco Use: No current tobacco use.      Diagnosis:  1. Adjustment disorder with mixed anxiety and depressed mood        Collateral Reports Completed:   Not Applicable    PLAN: (Patient Tasks / Therapist Tasks / Other)  Patient's goals for the next week include:    1. Clean out a closet  2. Do one activity that used to bring him aric (dirt biking, fishing, boating, etc.)  3. 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 Go to Review Flowsheets activity to see all data.     PHQ 7/16/2020   PHQ-9 Total Score 10   Q9: Thoughts of better off dead/self-harm past 2 weeks Not at all   Some encounter information is confidential and restricted. Go to Review Flowsheets activity to see all data.     JOSEPH-7 SCORE 7/16/2020   Total Score 4   Some encounter information is confidential and restricted. Go to Review Flowsheets activity to see all data.       Referral / Collaboration:  [email or web address] for dislocated worker program.     Anticipated number of sessions for this episode of care: 12-14    Measurable Treatment Goal(s) related to diagnosis / functional impairment(s)  Goal 1: Patient will identify ambiguous loss and will grieve the loss of his career and lifestyle.    Objective #A (Patient Action)    Patient will identify how avoidance/minimization contributes to the avoidance of feeling associated with the loss.  Status: New - Date: 7/30/20     Intervention(s)  Therapist will teach about ambiguous loss.    Objective #B  Patient will talk to at least two others about losses and coping.  Status: New - Date: 7/30/20     Intervention(s)  Therapist will assign homework for patient to increase social support and contact friends at least 1-2 times per week.    Objective #C  Patient will Identify negative self-talk and behaviors: challenge core beliefs, myths, and actions.  Status: New - Date: 7/30/20     Intervention(s)  Therapist will collaborate with patient to determine source of apathy/anger and will teach self-compassion.      Goal 2: Patient will learn to identify triggers for and warning signs of worsening depression; patient will improve his ability to advocate for himself.    Objective #A (Patient Action)    Status: New - Date: 7/30/20     Patient will identify 3-4 stressors which contribute to feelings of depression.    Intervention(s)  Therapist will teach emotional  recognition/identification.      Objective #B  Patient will identify 3-4 strategies to more effectively address stressors.    Status: New - Date: 7/30/20     Intervention(s)  Therapist will teach distraction skills.      Objective #C  Patient will learn and demonstrate 3-4 assertiveness skill(s).  Status: New - Date: 7/30/20     Intervention(s)  Therapist will role-play assertiveness skills.      Goal 3: Patient will improve his health and relationships with his wife and kids by showing vulnerability and practicing self-care.    Objective #A (Patient Action)    Status: New - Date: 7/30/20     Patient will attend and participate in social or recreational activities (i.e. son's hockey scrimmages).    Intervention(s)  Therapist will assign homework for patient to show up as is instead of worrying about putting on a happy front.    Objective #B  Patient will exercise for 30 minutes at least 3 times per week.    Status: New - Date: 7/30/20     Intervention(s)  Therapist will assign homework for patient to go to the gym and report back in session.    Objective #C  Patient will compile a list of boundaries that they would like to set with others.    Status: New - Date: 7/30/20     Intervention(s)  Therapist will teach about healthy boundaries. and Ramona Jackson's Power of Vulnerability (courage, connection, and compassion).      Patient has not reviewed nor agreed to the above plan.      Perla Sarmiento  July 30, 2020

## 2020-07-23 NOTE — PATIENT INSTRUCTIONS
"Patient's goals for the next week include:    1. Clean out a closet  2. Do one activity that used to bring him aric (dirt biking, fishing, boating, etc.)  3. Send resumes out to six different [employer]    Patient was referred to [email or web address] and discussed benefits of watching Ramona Jackson's [employer] special \"A Call to Courage.\"  "

## 2020-07-30 ENCOUNTER — VIRTUAL VISIT (OUTPATIENT)
Dept: PSYCHOLOGY | Facility: CLINIC | Age: 55
End: 2020-07-30
Payer: COMMERCIAL

## 2020-07-30 DIAGNOSIS — F43.23 ADJUSTMENT DISORDER WITH MIXED ANXIETY AND DEPRESSED MOOD: Primary | ICD-10-CM

## 2020-07-30 PROCEDURE — 90832 PSYTX W PT 30 MINUTES: CPT | Mod: 95 | Performed by: MARRIAGE & FAMILY THERAPIST

## 2020-07-30 NOTE — PROGRESS NOTES
Progress Note    Patient Name: Craig Wilfahrt  Date: 7/30/2020         Service Type: Individual      Session Start Time: 7:55 am  Session End Time: 8:30 am     Session Length: 35 minutes (patient had difficulty connecting to video call)    Session #: 4    Attendees: Client attended alone    Service Modality:  Video Visit:    Telemedicine Visit: The patient's condition can be safely assessed and treated via synchronous audio and visual telemedicine encounter.      Reason for Telemedicine Visit: Services only offered telehealth    Originating Site (Patient Location): Patient's home    Distant Site (Provider Location): [facility]: [place]    Consent:  The patient/guardian has verbally consented to: the potential risks and benefits of telemedicine (video visit) versus in person care; bill my insurance or make self-payment for services provided; and responsibility for payment of non-covered services.     Patient would like the video invitation sent by: Send to e-mail at: [email or web address]    Mode of Communication:  Video Conference via Amwell    As the provider I attest to compliance with applicable laws and regulations related to telemedicine.     Treatment Plan Last Reviewed: 7/30/2020  PHQ-9 / JOSEPH-7 : 7/16/20    DATA  Interactive Complexity: No  Crisis: No       Progress Since Last Session (Related to Symptoms / Goals / Homework):   Symptoms: Worsening slightly, as patient admits that he did not go to the gym this past week but did go for a walk with his wife and a neighbor; patient reports low motivation.    Homework: Partially completed - patient sent out two resumes, went for walk, but did not go fishing/dirt biking/etc.      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; patient worked as  for a month but quit as it became too much; patient has severance package and is working on applying to other jobs but is grieving loss of identity and status; loves his dogs and hunts; former hockey  and just resumed gym routine; has two kids (19 & 17) and is      Treatment Objective(s) Addressed in This Session:   identify how avoidance/minimization contributes to the avoidance of feeling associated with the loss  Identify negative self-talk and behaviors: challenge core beliefs, myths, and actions  exercise for 30 minutes at least 3 times per week  Discussed self-compassion and vulnerability       Intervention:   CBT: connect thoughts, feelings, anc actions  Psychodynamic: family systems  Solution Focused: identify solvable problems and generate possible solutions  Narrative Therapy: bright spots in story, \"the story I'm telling myself\"        ASSESSMENT: Current Emotional / Mental Status (status of significant symptoms):   Risk status (Self / Other harm or suicidal ideation)   Patient denies current fears or concerns for personal safety.   Patient denies current or recent suicidal ideation or behaviors.   Patient denies current or recent homicidal ideation or behaviors.   Patient denies current or recent self injurious behavior or ideation.   Patient denies other safety concerns.   Patient reports there has been no change in risk factors since their last session.     Patient reports there has been no change in protective factors since their last session.     Recommended that patient call 911 or go to the local ED should there be a change in any of these risk factors.     Appearance:   Appropriate    Eye Contact:   Fair    Psychomotor Behavior: Normal  Slowed    Attitude:   Cooperative  Pleasant   Orientation:   All   Speech    Rate / Production: Normal/ Responsive    Volume:  Normal    Mood:    Sad  Fearful " Apathetic   Affect:    Blunted  Lethargic  Subdued    Thought Content:  Clear  Rumination    Thought Form:  Coherent  Goal Directed    Insight:    Good      Medication Review:   No changes to current psychiatric medication(s)     Medication Compliance:   Yes     Changes in Health Issues:   None reported   Past - in ear; patient had a PCP appt. for a second injection to improve his hearing but declined to go because his ear was hurting.  Pt states that he will go again next week if his ear feels better.     Chemical Use Review:   Substance Use: Chemical use reviewed, no active concerns identified      Tobacco Use: No current tobacco use.      Diagnosis:  1. Adjustment disorder with mixed anxiety and depressed mood        Collateral Reports Completed:   Not Applicable    PLAN: (Patient Tasks / Therapist Tasks / Other)  Patient's goals for the next week include:    1. Continue to network  2. Find physical exercise for stress relief: get to gym, go for walks in the morning, etc.  3. 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 Go to Review  Flowsheets activity to see all data.     PHQ 7/16/2020   PHQ-9 Total Score 10   Q9: Thoughts of better off dead/self-harm past 2 weeks Not at all   Some encounter information is confidential and restricted. Go to Review Flowsheets activity to see all data.     JOSEPH-7 SCORE 7/16/2020   Total Score 4   Some encounter information is confidential and restricted. Go to Review Flowsheets activity to see all data.       Referral / Collaboration:  [email or web address] for dislocated worker program.     Anticipated number of sessions for this episode of care: 12-14    Measurable Treatment Goal(s) related to diagnosis / functional impairment(s)  Goal 1: Patient will identify ambiguous loss and will grieve the loss of his career and lifestyle.    Objective #A (Patient Action)    Patient will identify how avoidance/minimization contributes to the avoidance of feeling associated with the loss.  Status: New - Date: 7/30/20     Intervention(s)  Therapist will teach about ambiguous loss.    Objective #B  Patient will talk to at least two others about losses and coping.  Status: New - Date: 7/30/20     Intervention(s)  Therapist will assign homework for patient to increase social support and contact friends at least 1-2 times per week.    Objective #C  Patient will Identify negative self-talk and behaviors: challenge core beliefs, myths, and actions.  Status: New - Date: 7/30/20     Intervention(s)  Therapist will collaborate with patient to determine source of apathy/anger and will teach self-compassion.      Goal 2: Patient will learn to identify triggers for and warning signs of worsening depression; patient will improve his ability to advocate for himself.    Objective #A (Patient Action)    Status: New - Date: 7/30/20     Patient will identify 3-4 stressors which contribute to feelings of depression.    Intervention(s)  Therapist will teach emotional recognition/identification.      Objective #B  Patient will identify 3-4  strategies to more effectively address stressors.    Status: New - Date: 7/30/20     Intervention(s)  Therapist will teach distraction skills.      Objective #C  Patient will learn and demonstrate 3-4 assertiveness skill(s).  Status: New - Date: 7/30/20     Intervention(s)  Therapist will role-play assertiveness skills.      Goal 3: Patient will improve his health and relationships with his wife and kids by showing vulnerability and practicing self-care.    Objective #A (Patient Action)    Status: New - Date: 7/30/20     Patient will attend and participate in social or recreational activities (i.e. son's hockey scrimmages).    Intervention(s)  Therapist will assign homework for patient to show up as is instead of worrying about putting on a happy front.    Objective #B  Patient will exercise for 30 minutes at least 3 times per week.    Status: New - Date: 7/30/20     Intervention(s)  Therapist will assign homework for patient to go to the gym and report back in session.    Objective #C  Patient will compile a list of boundaries that they would like to set with others.    Status: New - Date: 7/30/20     Intervention(s)  Therapist will teach about healthy boundaries. and Ramona Jackson's Power of Vulnerability (courage, connection, and compassion).      Patient has reviewed and agreed to the above plan.      Perla Sarmiento  July 30, 2020

## 2020-07-30 NOTE — PATIENT INSTRUCTIONS
Patient's goals for the next week include:    1. Continue to network  2. Find physical exercise for stress relief: get to gym, go for walks in the morning, etc.  3. Maintain positive outlook and channel others' hope

## 2020-08-06 ENCOUNTER — VIRTUAL VISIT (OUTPATIENT)
Dept: PSYCHOLOGY | Facility: CLINIC | Age: 55
End: 2020-08-06
Payer: COMMERCIAL

## 2020-08-06 DIAGNOSIS — F43.22 ADJUSTMENT DISORDER WITH ANXIETY: ICD-10-CM

## 2020-08-06 DIAGNOSIS — F32.1 MODERATE MAJOR DEPRESSION, SINGLE EPISODE (H): Primary | ICD-10-CM

## 2020-08-06 PROCEDURE — 90791 PSYCH DIAGNOSTIC EVALUATION: CPT | Mod: 95 | Performed by: MARRIAGE & FAMILY THERAPIST

## 2020-08-06 NOTE — PROGRESS NOTES
"DA is being completed on this date due to clinician error.  [facility]  Evaluator Name: Perla Sarmiento     Credentials:  LMFT, LADC    PATIENT'S NAME: Craig Wilfahrt  PREFERRED NAME: Lam  PREFERRED PRONOUNS: he/him/his     MRN:   [id number]  :   1965   ACCT. NUMBER: [id number]  DATE OF SERVICE: 20  START TIME: 11 a.m.  END TIME: 12:04 p.m.  PREFERRED PHONE: [phone]  May we leave a program related message: Yes  Service Modality:  Video Visit:    Telemedicine Visit: The patient's condition can be safely assessed and treated via synchronous audio and visual telemedicine encounter.      Reason for Telemedicine Visit: Services only offered telehealth    Originating Site (Patient Location): Patient's home    Distant Site (Provider Location): [facility] [place]    Consent:  The patient/guardian has verbally consented to: the potential risks and benefits of telemedicine (video visit) versus in person care; bill my insurance or make self-payment for services provided; and responsibility for payment of non-covered services.     Patient would like the video invitation sent by: Send to e-mail at: [email or web address]}     Mode of Communication:  Video Conference via [employer]well    As the provider I attest to compliance with applicable laws and regulations related to telemedicine.    STANDARD ADULT DIAGNOSTIC ASSESSMENT    Identifying Information:  Patient is a 54 year old, ,  male.  The pronoun use throughout this assessment reflects the patient's chosen pronoun.  Patient was referred for an assessment by self, family and [facility].  Patient attended the session with his wife (for about 15 minutes).     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  Patient has struggled with motivation, energy, and paranoid thoughts for the past three months.  The problem(s) began in late April 2020. Patient has attempted to resolve these concerns in the past through medication and is willing to participate in a day treatment program.    Social/Family History:  Patient reported they grew up in [place], [place].  He was raised by his biological parents who remained together until his mom's death \"years ago.\"  Patient's father is still alive (age 95).  Patient has five older siblings (two brothers and three sisters).  Patient reported that he/him/his  childhood was \"good, busy, chaotic,\" and full of outdoor adventures.  Patient described their current relationships with family of origin as: closest to two brothers.      The patient describes his cultural background as: \"[place], [place], [place] town\".  Cultural influences and impact on patient's life structure, values, norms, and healthcare: Cultural Bias related to masculinity and shame.  Contextual influences on patient's health include: Individual Factors : patient lost his job after 26 years with the company, Family Factors : daughter is in 2nd year of college; son plays hockey; family stress related to patient's change in personality, Community Factors : [place], close-knit community, Societal Factors : COVID-19 quarantine, Economic Factors : loss of income due to termination of employment; severance pay will soon run out; job options are limited as unemployment rate is at 10% and Health- Seeking Factors : hearing loss in left ear following fall on ice in Feb. 2020; patient did not get third injection due to ear pain (will likely need hearing aid).    These factors will be addressed in the Preliminary Treatment plan.  Patient identified their preferred language to be English. Patient reported they does not need the assistance of an  or other " support involved in therapy.     Patient reported had no significant delays in developmental tasks.   Patient's highest education level was graduate school. Patient identified the following learning problems: none reported.  Modifications will not be used to assist communication in therapy.  Patient reports he is able to understand written materials.    Patient reported the following relationship history: one significant relationship.  Patient's current relationship status is  for almost 21 years.   Patient identified his sexual orientation as heterosexual.  Patient reported having two child(yuri) (a boy, 17, and a girl, 19). Patient identified partner, siblings, pets and friends as part of their support system.  Patient identified the quality of these relationships as stable and meaningful.  Patient admits that he has withdrawn from his friends as he does not want to explain his job loss or his feelings to them.    Patient's current living/housing situation involves staying in own home/apartment.  He lives with both children and his wife and he reports that housing is stable.     Patient is currently unemployed but is seeking employment.  Patient reports their finances are obtained through spouse and severance pay.  Patient does identify finances as a current stressor because he will have to change his lifestyle due to his job loss.     Patient reported that he has been involved with the legal system in 2000 when he received a DUI.  Patient denies being on probation / parole / under the jurisdiction of the court.    Patient's Strengths and Limitations:  Patient identified the following strengths or resources that will help them succeed in treatment: exercise routine, friends / good social support, family support, intelligence, strong social skills and work ethic. Things that may interfere with the patient's success in treatment include: lack of motivation and withdrawal from social support.    _______________________________________________  Personal and Family Medical History:   Patient did not report a family history of mental health concerns.  Patient reports family history includes Substance Abuse in his brother.    Patient reported the following previous diagnoses which include(s): none reported.  Patient reported symptoms began after he was downsized in late April 2020.  Patient has received mental health services in the past: primary care provider at [facility] (medications).  Psychiatric Hospitalizations: None.  Patient denies a history of civil commitment.  Currently, patient is not receiving other mental health services.  These include none.  Patient has had a physical exam to rule out medical causes for current symptoms.  Date of last physical exam was within the past year. Symptoms have developed since last physical exam and client was encouraged to follow up with PCP.  . The patient has a non-[facility] [place] Primary Care Provider. Their PCP is Dr. Keshav Trevizo.  Patient reports the following current medical concerns: hearing loss in left ear following fall on ice in Feb. 2020.  There are significant appetite / nutritional concerns / weight changes, as patient admits that he has lost about 30 pounds in the past month due to not eating/having any appetite.  Patient does report a history of head injury / trauma / cognitive impairment: Feb. 2020 fall on ice.    Patient reports current meds as:   Outpatient Medications Marked as Taking for the 8/6/20 encounter (Virtual Visit) with Perla Sarmiento   Medication Sig     atorvastatin (LIPITOR) 20 MG tablet Take 20 mg by mouth daily     escitalopram (LEXAPRO) 10 MG tablet Take 10 mg by mouth daily     lisinopril (ZESTRIL) 10 MG tablet Take 10 mg by mouth daily     LORazepam (ATIVAN) 0.5 MG tablet Take 0.5 mg by mouth 2 times daily as needed for anxiety     QUEtiapine (SEROQUEL) 50 MG tablet Take 1 tablet (50 mg) by mouth nightly as needed .   "May take 2 tablets (100 mg) if needed.       Medication Adherence:  Patient reports not taking psychiatric medications as prescribed. Client states reason for medication non-adherence as \"didn't like Seroquel and felt groggy the next day\".     Patient Allergies:  No Known Allergies    Medical History:    Past Medical History:   Diagnosis Date     Hypertension          Current Mental Status Exam:   Appearance:  Appropriate    Eye Contact:  Fair   Psychomotor:  Slowed  (patient takes an Ativan in the morning, according to his wife)      Gait / station:  no problem  Attitude / Demeanor: Cooperative  Interested  Speech      Rate / Production: Normal/ Responsive Slow       Volume:  Normal  volume      Language:  intact  Mood:   Depressed  Sad   Affect:   Blunted  Flat    Thought Content: Paranoia  (increasingly concerning - will continue to monitor)  Thought Process: Blocking  [place] Minimizes symptoms      Associations: No loosening of associations  Insight:   Fair   Judgment:  Intact   Orientation:  All  Attention/concentration: Good    Rating Scales:    PHQ9:    PHQ-9 SCORE 7/6/2020 7/16/2020   PHQ-9 Total Score 13 10   ;    GAD7:    JOSEPH-7 SCORE 7/6/2020 7/16/2020   Total Score 7 4     CGI:     First:Considering your total clinical experience with this particular patient population, how severe are the patient's symptoms at this time?: 5 (8/6/2020  3:00 PM)  ;    Most recentCompared to the patient's condition at the START of treatment, this patient's condition is: 4 (7/6/2020 11:13 AM)      Substance Use:  Patient did report a family history of substance use concerns; see medical history section for details.  Patient has not received chemical dependency treatment in the past.  Patient has not ever been to detox.      Patient is not currently receiving any chemical dependency treatment. Patient reported the following problems as a result of their substance use: DUI.    Patient reports using alcohol 1 times per day and " has 3-4 beers at a time. Patient admits that he has cut back on his drinking during the past month and does not drink nightly.  Patient denies using tobacco.  Patient denies using marijuana.  Patient reports using caffeine 2 times per week and drinks 1 at a time.  Patient reports using/abusing the following substance(s). Patient reported no other substance use.     CAGE- AID:    CAGE-AID Total Score 7/17/2020   Total Score 2       Substance Use: daily use    Based on the positive CAGE score and clinical interview there  are indications of drug or alcohol abuse. Will continue to monitor, as patient denies having a problem and reports that he has cut back recently.    Significant Losses / Trauma / Abuse / Neglect Issues:   Patient did not serve in the .  There are indications or report of significant loss, trauma, abuse or neglect issues related to: death of mother and family dog and job loss in April 2020.  Concerns for possible neglect are not present.     Safety Assessment:   Current Safety Concerns:  [place] Suicide Severity Rating Scale (Lifetime/Recent)  [place] Suicide Severity Rating (Lifetime/Recent) 7/6/2020 7/17/2020   1. Wish to be Dead (Lifetime) No No   1. Wish to be Dead (Recent) No No   2. Non-Specific Active Suicidal Thoughts (Lifetime) No No   2. Non-Specific Active Suicidal Thoughts (Recent) No No   3. Active Suicidal Ideation with any Methods (Not Plan) Without Intent to Act (Lifetime) No -   3. Active Sucidal Ideation with any Methods (Not Plan) Without Intent to Act (Recent) No -   4. Active Suicidal Ideation with Some Intent to Act, Without Specific Plan (Lifetime) No -   4. Active Suicidal Ideation with Some Intent to Act, Without Specific Plan (Recent) No -   5. Active Suicidal Ideation with Specific Plan and Intent (Lifetime) No -   5. Active Suicidal Ideation with Specific Plan and Intent (Recent) No -   Most Severe Ideation Rating (Lifetime) NA -   Frequency (Lifetime) NA -    Duration (Lifetime) NA -   Controllability (Lifetime) NA -   Protective Factors  (Lifetime) NA -   Reasons for Ideation (Lifetime) NA -   Most Severe Ideation Rating (Past Month) NA -   Frequency (Past Month) NA -   Duration (Past Month) NA -   Controllability (Past Month) NA -   Protective Factors (Past Month) NA -   Reasons for Ideation (Past Month) NA -   Actual Attempt (Lifetime) No No   Actual Attempt (Past 3 Months) No No   Has subject engaged in non-suicidal self-injurious behavior? (Lifetime) No No   Has subject engaged in non-suicidal self-injurious behavior? (Past 3 Months) No No   Interrupted Attempts (Lifetime) No -   Interrupted Attempts (Past 3 Months) No -   Aborted or Self-Interrupted Attempt (Lifetime) No -   Aborted or Self-Interrupted Attempt (Past 3 Months) No -   Preparatory Acts or Behavior (Lifetime) No -   Preparatory Acts or Behavior (Past 3 Months) No -     Patient denies current homicidal ideation and behaviors.  Patient denies current self-injurious ideation and behaviors.    Patient denied risk behaviors associated with substance use.  Patient denies any high risk behaviors associated with mental health symptoms.  Patient reports the following current concerns for their personal safety: None.  Patient reports there are firearms in the house. The firearms are secured in a locked space.     History of Safety Concerns:  Patient denied a history of homicidal ideation.     Patient denied a history of personal safety concerns.    Patient denied a history of assaultive behaviors.    Patient denied a history of sexual assault behaviors.     Patient denied a history of risk behaviors associated with substance use.  Patient denies any history of high risk behaviors associated with mental health symptoms.  Patient reports the following protective factors: positive relationships positive social network and positive family connections, dedication to family/friends, regular physical activity, living  with other people, positive social skills and pets    Risk Plan:  See Preliminary Treatment Plan for Safety and Risk Management Plan    Review of Symptoms per patient report:  Depression: Change in sleep, Lack of interest, Excessive or inappropriate guilt, Change in energy level, Change in appetite, Low self-worth, Ruminations, Feeling sad, down, or depressed and Withdrawn  [place]o:  No Symptoms  Psychosis: some slight paranoia (feels like people are looking at him or watching him)  Anxiety: Excessive worry, Nervousness, Social anxiety, Sleep disturbance, Ruminations and Poor concentration  Panic:  Palpitations and Shortness of breath  Post Traumatic Stress Disorder:  No Symptoms   Eating Disorder: No Symptoms  ADD / ADHD:  No symptoms  Conduct Disorder: No symptoms  Autism Spectrum Disorder: No symptoms  Obsessive Compulsive Disorder: No Symptoms    Patient reports the following compulsive behaviors and treatment history: N/A.      Diagnostic Criteria:   A) Single episode - symptoms have been present during the same 2-week period and represent a change from previous functioning 5 or more symptoms (required for diagnosis)   - Depressed mood. Note: In children and adolescents, can be irritable mood.     - Diminished interest or pleasure in all, or almost all, activities.    - Significant weight loss when not dieting decrease in appetite.    - Decreased sleep.    - Fatigue or loss of energy.    - Feelings of worthlessness or inappropriate and excessive guilt.    - Diminished ability to think or concentrate, or indecisiveness.   B) The symptoms cause clinically significant distress or impairment in social, occupational, or other important areas of functioning  C) The episode is not attributable to the physiological effects of a substance or to another medical condition  D) The occurence of major depressive episode is not better explained by other thought / psychotic disorders  E) There has never been a manic episode or  hypomanic episode  A. The development of emotional or behavioral symptoms in response to an identifiable stressor(s) occurring within 3 months of the onset of the stressor(s)  B. These symptoms or behaviors are clinically significant, as evidenced by one or both of the following:       - Significant impairment in social, occupational, or other important areas of functioning  C. The stress-related disturbance does not meet criteria for another disorder & is not not an exacerbation of another mental disorder  D. The symptoms do not represent normal bereavement  E. Once the stressor or its consequences have terminated, the symptoms do not persist for more than an additional 6 months       * Adjustment Disorder with Anxiety: The predominant manfestations are symptoms such as nervousness, worry, or jitteriness, or, in children separation anxiety from major attachment figures    Functional Status:  Patient reports the following functional impairments: health maintenance, home life with wife and teen children, management of the household and or completion of tasks, relationship(s), self-care and social interactions.     WHODAS:   WHODAS 2.0 Total Score 7/6/2020 7/17/2020   Total Score 24 25       Clinical Summary:  1. Reason for assessment: patient presented to ER for medications for increased anxiety and low motivation; ER doctor referred him for therapy.  Patient has experienced increasing symptoms of depression and anxiety since late April 2020 when he was terminated from his job of 26 years.  2. Psychosocial, Cultural and Contextual Factors: 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, 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; patient worked as  for a month but quit as it became too much; patient has severance package and is working on applying to other jobs but is grieving loss of identity and status; loves his dogs and hunts; former hockey  and just resumed gym routine; has two kids (19 & 17)  and is .  3. Principal DSM5 Diagnoses  (Sustained by DSM5 Criteria Listed Above):   296.22 (F32.1)  Major Depressive Disorder, Single Episode, Moderate _  Adjustment Disorders  309.24 (F43.22) With anxiety  4. 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 Provisional Diagnosis: N/A  6. Prognosis: Relieve Acute Symptoms and Maintain Current Status / Prevent Deterioration.  7. Likely consequences of symptoms if not treated: further decline in daily functioning and continued social withdrawal.  8. Client strengths include:  educated, intelligent, responsible parent, support of family, friends and providers, willing to ask questions and work history .     Recommendations:     1. Plan for Safety and Risk Management:Recommended that patient call 911 or go to the local ED should there be a change in any of these risk factors.  Report to child / adult protection services was NA.     2. 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     3. Initial Treatment will focus on: Depressed Mood - identify underlying causes; increasing ability to cope  Anxiety - self-esteem, self-compassion, concerns about how others view patient, distraction skills  Adjustment Difficulties related to: recent job loss and unemployment  Grief / Loss - loss of lifestyle and career identity (ambiguous loss)     4. Resources/Service Plan:       services are not indicated.     Modifications to assist communication are not indicated.     Additional disability accommodations are not indicated.      5. Collaboration:  Collaboration / coordination of treatment will be initiated with the following support professionals: day treatment and psychiatry.      6.  Referrals:  The following referral(s) will be initiated: Psychiatry  55+ Senior  Outpatient Program. Next Scheduled Appointment: TBD.  A Release of Information has been obtained for the following: N/A - may obtain for psychiatry in future.    7. HEAVEN:  Discussed the general effects of drugs and alcohol on health and well-being.     8. Records were partially available for review at time of assessment.  Information in this assessment was obtained from the medical record and provided by patient and family who are good and fair historians.   Patient will have open access to their mental health medical record.      Eval type:  Mental Health    Staff Name/Credentials:  KEN[facility] Hoyt, [facility]  August 6, 2020

## 2020-08-07 PROBLEM — F32.1 MODERATE MAJOR DEPRESSION, SINGLE EPISODE (H): Status: ACTIVE | Noted: 2020-08-07

## 2020-08-07 PROBLEM — F43.22 ADJUSTMENT DISORDER WITH ANXIETY: Status: ACTIVE | Noted: 2020-08-07

## 2020-08-07 NOTE — PATIENT INSTRUCTIONS
The following referrals were discussed:  [facility]  [facility] (for psychiatric medication management): [phone]  [facility] (55+ referral placed on this date); patient declined to schedule  PCP for hearing evaluation    Patient reports that his goals for the next week include:  1. Call or text Raúl WRIGHT & Galdino ROJAS  2. Continue to work out at least 3-4x/week  3. Keep asking friends and kids to connect

## 2020-08-14 ENCOUNTER — FCC EXTENDED DOCUMENTATION (OUTPATIENT)
Dept: PSYCHOLOGY | Facility: CLINIC | Age: 55
End: 2020-08-14

## 2020-08-14 ENCOUNTER — VIRTUAL VISIT (OUTPATIENT)
Dept: PSYCHOLOGY | Facility: CLINIC | Age: 55
End: 2020-08-14
Payer: COMMERCIAL

## 2020-08-14 DIAGNOSIS — F32.1 MODERATE MAJOR DEPRESSION, SINGLE EPISODE (H): Primary | ICD-10-CM

## 2020-08-14 DIAGNOSIS — F43.22 ADJUSTMENT DISORDER WITH ANXIETY: ICD-10-CM

## 2020-08-14 PROCEDURE — 90834 PSYTX W PT 45 MINUTES: CPT | Mod: 95 | Performed by: MARRIAGE & FAMILY THERAPIST

## 2020-08-14 NOTE — PATIENT INSTRUCTIONS
Patient's homework for the week includes:  Follow up with the [facility] and try to attend one group  Schedule with physician at either [facility] or [facility]    Patient reports that his goals for the next week include:  1. Build more structure into morning routine Mon-Thurs:    - set alarm on phone for 7 a.m./place phone out of reach   - exercise (go for walk, take dogs for walk, work out w/neighbor, or go to gym)   - clean around the house/help wife with tasks

## 2020-08-14 NOTE — PROGRESS NOTES
Progress Note    Patient Name: Craig Wilfahrt  Date: 8/14/2020         Service Type: Individual      Session Start Time: 10:13 am  Session End Time: 11:03 am     Session Length: 50 minutes    Session #: 6    Attendees: Client attended alone    Service Modality:  Video Visit:    Telemedicine Visit: The patient's condition can be safely assessed and treated via synchronous audio and visual telemedicine encounter.      Reason for Telemedicine Visit: Services only offered telehealth    Originating Site (Patient Location): Patient's home    Distant Site (Provider Location): [facility]: [place]    Consent:  The patient/guardian has verbally consented to: the potential risks and benefits of telemedicine (video visit) versus in person care; bill my insurance or make self-payment for services provided; and responsibility for payment of non-covered services.     Patient would like the video invitation sent by: Send to e-mail at: [email or web address]    Mode of Communication:  Video Conference via Amwell    As the provider I attest to compliance with applicable laws and regulations related to telemedicine.     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    Homework: Partially completed - patient connected with a friend, called the [employer] , went dirt-biking, but did not work out and reports low motivation.      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 after driving to two different dealerships and talking to the owners; patient continues to report little structure and motivation and admits that his wife is frustrated by his low energy     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, 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; patient worked as  for a month but quit as it became too much; patient has severance package and is working on applying to other jobs but is grieving loss of identity and status; loves his dogs and hunts; former hockey  and just resumed gym routine; has two kids (19 & 17) and is      Treatment Objective(s) Addressed in This Session:   identify how avoidance/minimization contributes to the avoidance of feeling associated with the loss  Identify negative self-talk and behaviors: challenge core beliefs, myths, and actions  Discussed self-compassion and vulnerability  exercise for 30 minutes at least 3 times per week       Intervention:   CBT: connect thoughts, feelings, and actions  Psychodynamic: family systems  Solution Focused: identify solvable problems and generate possible solutions  Narrative Therapy: bright spots in story, \"the story I'm telling myself\"        ASSESSMENT: Current Emotional / Mental Status (status of significant symptoms):   Risk status (Self / Other harm or suicidal ideation)   Patient denies current fears or concerns for personal safety.   Patient denies current or recent suicidal ideation or behaviors.   Patient denies current or recent homicidal ideation or behaviors.   Patient denies current or recent self injurious behavior or ideation.   Patient denies other safety concerns.   Patient reports there has been no change in risk factors since their last session.     Patient reports there has been no change in " protective factors since their last session.     Recommended that patient call 911 or go to the local ED should there be a change in any of these risk factors.     Appearance:   Appropriate    Eye Contact:   Fair    Psychomotor Behavior: Normal  Slowed    Attitude:   Cooperative  Pleasant   Orientation:   All   Speech    Rate / Production: Normal/ Responsive    Volume:  Normal    Mood:    Anxious  Sad    Affect:    Flat    Thought Content:  Clear  Rumination    Thought Form:  Logical  Circumstantial   Insight:    Good      Medication Review:   No changes to current psychiatric medication(s)     Medication Compliance:   Yes     Changes in Health Issues:   None reported   Past - in ear; patient had a PCP appt. for a second injection to improve his hearing but declined to go because his ear was hurting.  Pt states that he will go again next week if his ear feels better.     Chemical Use Review:   Substance Use: Chemical use reviewed, no active concerns identified      Tobacco Use: No current tobacco use.      Diagnosis:  1. Moderate major depression, single episode (H)    2. Adjustment disorder with anxiety        Collateral Reports Completed:   Not Applicable - referred patient to schedule with Dr. Hercules at [facility] if he is unable to see his [facility] physician.    PLAN: (Patient Tasks / Therapist Tasks / Other)    Patient's homework for the week includes:  Follow up with the [employer] and try to attend one group  Schedule with physician at either [facility] or [facility]    Patient reports that his goals for the next week include:  1. Build more structure into morning routine Mon-Thurs:    - set alarm on phone for 7 a.m./place phone out of reach   - exercise (go for walk, take dogs for walk, work out w/neighbor, or go to gym)   - clean around the house/help wife with tasks       Perla Sarmiento                                                          ______________________________________________________________________    Treatment Plan    Patient's Name: Craig Wilfahrt  YOB: 1965    Date: 7/30/2020    DSM5 Diagnoses: Adjustment Disorders  309.28 (F43.23) With mixed anxiety and depressed mood  Psychosocial / Contextual Factors: 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, 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; patient worked as  for a month but quit as it became too much; patient has severance package and is working on applying to other jobs but is grieving loss of identity and status; loves his dogs and hunts; former hockey  and just resumed gym routine; has two kids (19 & 17) and is     WHODAS 2.0 Total Score 7/17/2020   Total Score 25   Some encounter information is confidential and restricted. Go to Review Flowsheets activity to see all data.     PHQ 7/16/2020   PHQ-9 Total Score 10   Q9: Thoughts of better off dead/self-harm past 2 weeks Not at all   Some encounter information is confidential and restricted. Go to Review Flowsheets activity to see all data.     JOSEPH-7 SCORE 7/16/2020   Total Score 4   Some encounter information is confidential and restricted. Go to Review Flowsheets activity to see all data.       Referral / Collaboration:  [email or web address] for dislocated worker program.     Anticipated number of sessions for this episode of care: 12-14    Measurable Treatment Goal(s) related to diagnosis / functional impairment(s)  Goal 1: Patient will identify ambiguous loss and will grieve the loss of his career and lifestyle.    Objective #A (Patient Action)    Patient will identify how avoidance/minimization contributes to the avoidance of feeling associated with the loss.  Status: New - Date: 7/30/20     Intervention(s)  Therapist will teach about ambiguous loss.    Objective #B  Patient will talk to at least two others about losses and coping.  Status: New - Date: 7/30/20      Intervention(s)  Therapist will assign homework for patient to increase social support and contact friends at least 1-2 times per week.    Objective #C  Patient will Identify negative self-talk and behaviors: challenge core beliefs, myths, and actions.  Status: New - Date: 7/30/20     Intervention(s)  Therapist will collaborate with patient to determine source of apathy/anger and will teach self-compassion.      Goal 2: Patient will learn to identify triggers for and warning signs of worsening depression; patient will improve his ability to advocate for himself.    Objective #A (Patient Action)    Status: New - Date: 7/30/20     Patient will identify 3-4 stressors which contribute to feelings of depression.    Intervention(s)  Therapist will teach emotional recognition/identification.      Objective #B  Patient will identify 3-4 strategies to more effectively address stressors.    Status: New - Date: 7/30/20     Intervention(s)  Therapist will teach distraction skills.      Objective #C  Patient will learn and demonstrate 3-4 assertiveness skill(s).  Status: New - Date: 7/30/20     Intervention(s)  Therapist will role-play assertiveness skills.      Goal 3: Patient will improve his health and relationships with his wife and kids by showing vulnerability and practicing self-care.    Objective #A (Patient Action)    Status: New - Date: 7/30/20     Patient will attend and participate in social or recreational activities (i.e. son's hockey scrimmages).    Intervention(s)  Therapist will assign homework for patient to show up as is instead of worrying about putting on a happy front.    Objective #B  Patient will exercise for 30 minutes at least 3 times per week.    Status: New - Date: 7/30/20     Intervention(s)  Therapist will assign homework for patient to go to the gym and report back in session.    Objective #C  Patient will compile a list of boundaries that they would like to set with others.    Status: New -  Date: 7/30/20     Intervention(s)  Therapist will teach about healthy boundaries. and Ramona Jackson's Power of Vulnerability (courage, connection, and compassion).      Patient has reviewed and agreed to the above plan.      Perla Sarmiento  July 30, 2020

## 2020-08-19 ENCOUNTER — VIRTUAL VISIT (OUTPATIENT)
Dept: PSYCHOLOGY | Facility: CLINIC | Age: 55
End: 2020-08-19
Payer: COMMERCIAL

## 2020-08-19 DIAGNOSIS — F32.1 MODERATE MAJOR DEPRESSION, SINGLE EPISODE (H): Primary | ICD-10-CM

## 2020-08-19 DIAGNOSIS — F43.22 ADJUSTMENT DISORDER WITH ANXIETY: ICD-10-CM

## 2020-08-19 PROCEDURE — 90834 PSYTX W PT 45 MINUTES: CPT | Mod: 95 | Performed by: MARRIAGE & FAMILY THERAPIST

## 2020-08-19 NOTE — PATIENT INSTRUCTIONS
Patient reports that his goals for the next week include:    1. Continue to stay active and do activities with the kids  2. Workout at least 3x/week and take dogs for walks at least 2x/week  3. Continue job search and start new job on Monday

## 2020-08-19 NOTE — PROGRESS NOTES
Progress Note    Patient Name: Craig Wilfahrt  Date: 8/19/2020         Service Type: Individual      Session Start Time: 9:15 am  Session End Time: 11:05 am     Session Length: 50 minutes    Session #: 7    Attendees: Client attended alone    Service Modality:  Video Visit:    Telemedicine Visit: The patient's condition can be safely assessed and treated via synchronous audio and visual telemedicine encounter.      Reason for Telemedicine Visit: Services only offered telehealth    Originating Site (Patient Location): Patient's home    Distant Site (Provider Location): [facility]: [place]    Consent:  The patient/guardian has verbally consented to: the potential risks and benefits of telemedicine (video visit) versus in person care; bill my insurance or make self-payment for services provided; and responsibility for payment of non-covered services.     Patient would like the video invitation sent by: Send to e-mail at: [email or web address]    Mode of Communication:  Video Conference via Amwell    As the provider I attest to compliance with applicable laws and regulations related to telemedicine.     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    Homework: Partially completed - patient plans to work out today in his garage but has not worked out this week so far; patient did scheduled with his ENT and attended psychiatry appointment; patient has built some structure into his morning.      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 and return home on weekends; patient continues to report little structure and motivation and admits that his wife and kids are frustrated by his low energy     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, 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; patient worked as  for a month but quit as it became too much; patient has severance package and is working on applying to other jobs but is grieving loss of identity and status; loves his dogs and hunts; former hockey  and just resumed gym routine; has two kids (19 & 17) and is      Treatment Objective(s) Addressed in This Session:   identify how avoidance/minimization contributes to the avoidance of feeling associated with the loss  Identify negative self-talk and behaviors: challenge core beliefs, myths, and actions  Increase engagement in life activities  Improve social connections (at least one initiated daily)  Discussed self-compassion and vulnerability  exercise for 30 minutes at least 3 times per week       Intervention:   CBT: connect thoughts, feelings, and actions  Psychodynamic: family systems  Solution Focused: identify solvable problems and generate possible solutions  Narrative Therapy: bright spots in story, \"the story I'm telling myself\"        ASSESSMENT: Current Emotional / Mental Status (status of significant symptoms):   Risk status (Self / Other harm or suicidal ideation)   Patient denies current fears or concerns for personal safety.   Patient denies current or recent suicidal ideation or behaviors.   Patient denies current or recent homicidal ideation or behaviors.   Patient denies current or recent self injurious behavior or ideation.   Patient denies " other safety concerns.   Patient reports there has been no change in risk factors since their last session.     Patient reports there has been no change in protective factors since their last session.     Recommended that patient call 911 or go to the local ED should there be a change in any of these risk factors.     Appearance:   Appropriate    Eye Contact:   Fair    Psychomotor Behavior: Normal  Slowed    Attitude:   Cooperative  Pleasant   Orientation:   All   Speech    Rate / Production: Normal/ Responsive    Volume:  Normal    Mood:    Anxious  a little happier than usual   Affect:    Flat    Thought Content:  Clear  Rumination    Thought Form:  Logical  Circumstantial   Insight:    Good      Medication Review:   No changes to current psychiatric medication(s)     Medication Compliance:   Yes     Changes in Health Issues:   None reported   Past - in ear; patient had a PCP appt. for a second injection to improve his hearing but declined to go because his ear was hurting.  Pt states that he will go again next week if his ear feels better.     Chemical Use Review:   Substance Use: Chemical use reviewed, no active concerns identified      Tobacco Use: No current tobacco use.      Diagnosis:  1. Moderate major depression, single episode (H)    2. Adjustment disorder with anxiety        Collateral Reports Completed:   Not Applicable - referred patient to schedule with Dr. Hercules at [facility] if he is unable to see his [facility] physician.  Had referred patient for 55+ program but with patient's new job he is unable to attend.    PLAN: (Patient Tasks / Therapist Tasks / Other)    Patient reports that his goals for the next week include:    1. Continue to stay active and do activities with the kids  2. Workout at least 3x/week and take dogs for walks at least 2x/week  3. Continue job search and start new job on Monday      Perla Sarmiento                                                          ______________________________________________________________________    Treatment Plan    Patient's Name: Craig Wilfahrt  YOB: 1965    Date: 7/30/2020    DSM5 Diagnoses: Adjustment Disorders  309.28 (F43.23) With mixed anxiety and depressed mood  Psychosocial / Contextual Factors: 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, 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; patient worked as  for a month but quit as it became too much; patient has severance package and is working on applying to other jobs but is grieving loss of identity and status; loves his dogs and hunts; former hockey  and just resumed gym routine; has two kids (19 & 17) and is     WHODAS 2.0 Total Score 7/17/2020   Total Score 25   Some encounter information is confidential and restricted. Go to Review Flowsheets activity to see all data.     PHQ 7/16/2020   PHQ-9 Total Score 10   Q9: Thoughts of better off dead/self-harm past 2 weeks Not at all   Some encounter information is confidential and restricted. Go to Review Flowsheets activity to see all data.     JOSEPH-7 SCORE 7/16/2020   Total Score 4   Some encounter information is confidential and restricted. Go to Review Flowsheets activity to see all data.       Referral / Collaboration:  [employer] for dislocated worker program.     Anticipated number of sessions for this episode of care: 12-14    Measurable Treatment Goal(s) related to diagnosis / functional impairment(s)  Goal 1: Patient will identify ambiguous loss and will grieve the loss of his career and lifestyle.    Objective #A (Patient Action)    Patient will identify how avoidance/minimization contributes to the avoidance of feeling associated with the loss.  Status: New - Date: 7/30/20     Intervention(s)  Therapist will teach about ambiguous loss.    Objective #B  Patient will talk to at least two others about losses and coping.  Status: New - Date: 7/30/20      Intervention(s)  Therapist will assign homework for patient to increase social support and contact friends at least 1-2 times per week.    Objective #C  Patient will Identify negative self-talk and behaviors: challenge core beliefs, myths, and actions.  Status: New - Date: 7/30/20     Intervention(s)  Therapist will collaborate with patient to determine source of apathy/anger and will teach self-compassion.      Goal 2: Patient will learn to identify triggers for and warning signs of worsening depression; patient will improve his ability to advocate for himself.    Objective #A (Patient Action)    Status: New - Date: 7/30/20     Patient will identify 3-4 stressors which contribute to feelings of depression.    Intervention(s)  Therapist will teach emotional recognition/identification.      Objective #B  Patient will identify 3-4 strategies to more effectively address stressors.    Status: New - Date: 7/30/20     Intervention(s)  Therapist will teach distraction skills.      Objective #C  Patient will learn and demonstrate 3-4 assertiveness skill(s).  Status: New - Date: 7/30/20     Intervention(s)  Therapist will role-play assertiveness skills.      Goal 3: Patient will improve his health and relationships with his wife and kids by showing vulnerability and practicing self-care.    Objective #A (Patient Action)    Status: New - Date: 7/30/20     Patient will attend and participate in social or recreational activities (i.e. son's hockey scrimmages).    Intervention(s)  Therapist will assign homework for patient to show up as is instead of worrying about putting on a happy front.    Objective #B  Patient will exercise for 30 minutes at least 3 times per week.    Status: New - Date: 7/30/20     Intervention(s)  Therapist will assign homework for patient to go to the gym and report back in session.    Objective #C  Patient will compile a list of boundaries that they would like to set with others.    Status: New -  Date: 7/30/20     Intervention(s)  Therapist will teach about healthy boundaries. and Ramona Jackson's Power of Vulnerability (courage, connection, and compassion).      Patient has reviewed and agreed to the above plan.      Perla Sarmiento  July 30, 2020

## 2021-01-04 ENCOUNTER — HEALTH MAINTENANCE LETTER (OUTPATIENT)
Age: 56
End: 2021-01-04

## 2021-01-07 ENCOUNTER — FCC EXTENDED DOCUMENTATION (OUTPATIENT)
Dept: PSYCHOLOGY | Facility: CLINIC | Age: 56
End: 2021-01-07

## 2021-01-07 NOTE — PROGRESS NOTES
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  Company was restructured in late April, 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; patient worked as  for a month but quit as it became too much; patient has severance package and is working on applying to other jobs but is grieving loss of identity and status; loves his dogs and hunts; former hockey  and just resumed gym routine; has two kids (19 & 17) and is .  WHODAS 2.0 Total Score 7/17/2020   Total Score 25   Some encounter information is confidential and restricted. Go to Review Flowsheets activity to see all data.         Presenting Concern:  Distress about losing job and struggling with motivation.      Reason for Discharge:  Client did not return      Disposition at Time of Last Encounter:   Comments:   Patient had started a new job and appeared hopeful; patient had also established psychiatry.     Risk Management:   Client denies a history of suicidal ideation, suicide attempts, self-injurious behavior, homicidal ideation, homicidal behavior and and other safety concerns  Recommended that patient call 911 or go to the local ED should there be a change in any of these risk factors.      Referred To:  PCP and psychiatry; patient is able to scheduled again as needed.        Perla HOUSE  [place]   1/7/2021

## 2021-09-07 ENCOUNTER — HOSPITAL ENCOUNTER (OUTPATIENT)
Dept: BEHAVIORAL HEALTH | Facility: CLINIC | Age: 56
Discharge: HOME | End: 2021-09-07
Attending: FAMILY MEDICINE | Admitting: FAMILY MEDICINE
Payer: COMMERCIAL

## 2021-09-07 ENCOUNTER — TELEPHONE (OUTPATIENT)
Dept: BEHAVIORAL HEALTH | Facility: CLINIC | Age: 56
End: 2021-09-07

## 2021-09-07 PROCEDURE — 90791 PSYCH DIAGNOSTIC EVALUATION: CPT | Mod: 95 | Performed by: COUNSELOR

## 2021-09-07 RX ORDER — VENLAFAXINE HYDROCHLORIDE 75 MG/1
CAPSULE, EXTENDED RELEASE ORAL
COMMUNITY
Start: 2021-06-01

## 2021-09-07 ASSESSMENT — COLUMBIA-SUICIDE SEVERITY RATING SCALE - C-SSRS
2. HAVE YOU ACTUALLY HAD ANY THOUGHTS OF KILLING YOURSELF LIFETIME?: NO
5. HAVE YOU STARTED TO WORK OUT OR WORKED OUT THE DETAILS OF HOW TO KILL YOURSELF? DO YOU INTEND TO CARRY OUT THIS PLAN?: NO
5. HAVE YOU STARTED TO WORK OUT OR WORKED OUT THE DETAILS OF HOW TO KILL YOURSELF? DO YOU INTEND TO CARRY OUT THIS PLAN?: NO
TOTAL  NUMBER OF INTERRUPTED ATTEMPTS LIFETIME: NO
1. IN THE PAST MONTH, HAVE YOU WISHED YOU WERE DEAD OR WISHED YOU COULD GO TO SLEEP AND NOT WAKE UP?: NO
6. HAVE YOU EVER DONE ANYTHING, STARTED TO DO ANYTHING, OR PREPARED TO DO ANYTHING TO END YOUR LIFE?: NO
TOTAL  NUMBER OF INTERRUPTED ATTEMPTS PAST 3 MONTHS: NO
4. HAVE YOU HAD THESE THOUGHTS AND HAD SOME INTENTION OF ACTING ON THEM?: NO
TOTAL  NUMBER OF ABORTED OR SELF INTERRUPTED ATTEMPTS PAST 3 MONTHS: NO
TOTAL  NUMBER OF ABORTED OR SELF INTERRUPTED ATTEMPTS PAST LIFETIME: NO
6. HAVE YOU EVER DONE ANYTHING, STARTED TO DO ANYTHING, OR PREPARED TO DO ANYTHING TO END YOUR LIFE?: NO
1. IN THE PAST MONTH, HAVE YOU WISHED YOU WERE DEAD OR WISHED YOU COULD GO TO SLEEP AND NOT WAKE UP?: NO
2. HAVE YOU ACTUALLY HAD ANY THOUGHTS OF KILLING YOURSELF?: NO
3. HAVE YOU BEEN THINKING ABOUT HOW YOU MIGHT KILL YOURSELF?: NO
4. HAVE YOU HAD THESE THOUGHTS AND HAD SOME INTENTION OF ACTING ON THEM?: NO
ATTEMPT LIFETIME: NO
ATTEMPT PAST THREE MONTHS: NO

## 2021-09-07 ASSESSMENT — ANXIETY QUESTIONNAIRES
1. FEELING NERVOUS, ANXIOUS, OR ON EDGE: NEARLY EVERY DAY
7. FEELING AFRAID AS IF SOMETHING AWFUL MIGHT HAPPEN: NOT AT ALL
6. BECOMING EASILY ANNOYED OR IRRITABLE: MORE THAN HALF THE DAYS
7. FEELING AFRAID AS IF SOMETHING AWFUL MIGHT HAPPEN: NOT AT ALL
GAD7 TOTAL SCORE: 16
5. BEING SO RESTLESS THAT IT IS HARD TO SIT STILL: MORE THAN HALF THE DAYS
GAD7 TOTAL SCORE: 16
8. IF YOU CHECKED OFF ANY PROBLEMS, HOW DIFFICULT HAVE THESE MADE IT FOR YOU TO DO YOUR WORK, TAKE CARE OF THINGS AT HOME, OR GET ALONG WITH OTHER PEOPLE?: EXTREMELY DIFFICULT
2. NOT BEING ABLE TO STOP OR CONTROL WORRYING: NEARLY EVERY DAY
GAD7 TOTAL SCORE: 16
3. WORRYING TOO MUCH ABOUT DIFFERENT THINGS: NEARLY EVERY DAY
4. TROUBLE RELAXING: NEARLY EVERY DAY

## 2021-09-07 ASSESSMENT — PATIENT HEALTH QUESTIONNAIRE - PHQ9
10. IF YOU CHECKED OFF ANY PROBLEMS, HOW DIFFICULT HAVE THESE PROBLEMS MADE IT FOR YOU TO DO YOUR WORK, TAKE CARE OF THINGS AT HOME, OR GET ALONG WITH OTHER PEOPLE: VERY DIFFICULT
SUM OF ALL RESPONSES TO PHQ QUESTIONS 1-9: 21
SUM OF ALL RESPONSES TO PHQ QUESTIONS 1-9: 21
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
## 2021-09-07 NOTE — TELEPHONE ENCOUNTER
RN Review of Medical History / Physical Health Screen  Outpatient Mental Health Programs - [facility] 55+ Program    PATIENT'S NAME: Craig M Wilfahrt  MRN:   [id number]  :   1965  ACCT. NUMBER: [id number]  CURRENT AGE:  55 year old    DATE OF DIAGNOSTIC ASSESSMENT: 21  DATE OF ADMISSION: 21     Please see Diagnostic Assessment for additional Medical History.     General Health:   Have you had any exposure to any communicable disease in the past 2-3 weeks? no     Are you aware of safe sex practices? yes   Do you have a history of seizures?     If so, do you have a seizure plan? Known triggers?     Notify patient that we will call 911 (if virtual) or a code (if in-person), if we were to witness seizure during group. no    no  no    yes     Nutrition:    Are you on a special diet? If yes, please explain:  no   Do you have any concerns regarding your nutritional status? If yes, please explain:  no   Have you had any appetite changes in the last 3 months?  No     Have you had any weight loss or weight gain in the last 3 months?  No     Do you have a history of an eating disorder? no   Do you have a history of being in an eating disorder program? no     Patient height and weight recorded by RN in epic flowsheet: no    No; Unable to measure  Because of temporary in-person programmatic suspension due to COVID-19 pandemic, all pt weights and heights will be collected through patient self-report an recorded in physical health screening progress note upon admission to the program.                            Height/Weight Review:  Patient reported height:     6'   Patient reports weight:  Date last checked:  210lb   Any referrals/needs identified?  no        BMI Review:  Was the patient informed of BMI? no      Findings  No Intervention         Fall Risk:   Have you had any falls in the past 3 months? no     Do you currently use any assistive devices for mobility?   no      Additional  Comments/Assessment: saw PCP 2 months ago, denies outstanding medical concerns    Per completion of the Medical History / Physical Health Screen, is there a recommendation to see / follow up with a primary care physician/clinic or dentist?    No.      Ashlyn Quintero RN  9/7/2021

## 2021-09-08 ENCOUNTER — HOSPITAL ENCOUNTER (OUTPATIENT)
Dept: BEHAVIORAL HEALTH | Facility: CLINIC | Age: 56
End: 2021-09-08
Attending: PSYCHIATRY & NEUROLOGY
Payer: COMMERCIAL

## 2021-09-08 PROBLEM — F33.1 MDD (MAJOR DEPRESSIVE DISORDER), RECURRENT EPISODE, MODERATE (H): Status: ACTIVE | Noted: 2021-09-08

## 2021-09-08 PROCEDURE — 90853 GROUP PSYCHOTHERAPY: CPT | Mod: GT | Performed by: SOCIAL WORKER

## 2021-09-08 PROCEDURE — 90853 GROUP PSYCHOTHERAPY: CPT | Mod: 95

## 2021-09-08 PROCEDURE — 90853 GROUP PSYCHOTHERAPY: CPT | Mod: 95 | Performed by: SOCIAL WORKER

## 2021-09-08 ASSESSMENT — PATIENT HEALTH QUESTIONNAIRE - PHQ9: SUM OF ALL RESPONSES TO PHQ QUESTIONS 1-9: 21

## 2021-09-08 ASSESSMENT — ANXIETY QUESTIONNAIRES: GAD7 TOTAL SCORE: 16

## 2021-09-08 NOTE — GROUP NOTE
Service Modality:  Video Visit     Telemedicine Visit: The patient's condition can be safely assessed and treated via synchronous audio and visual telemedicine encounter.       Reason for Telemedicine Visit: Services only offered telehealth and due to COVID-19.     Originating Site (Patient Location): Patient's home     Distant Site (Provider Location): Provider Remote Setting- Home Office     Consent:  The patient/guardian has verbally consented to: the potential risks and benefits of telemedicine (video visit) versus in person care; bill my insurance or make self-payment for services provided; and responsibility for payment of non-covered services.      Patient would like the video invitation sent by:  My Chart     Mode of Communication:  Video Conference via Medical Zoom     As the provider I attest to compliance with applicable laws and regulations related to telemedicine.      Psychoeducation Group Note    PATIENT'S NAME: Craig M Wilfahrt  MRN:   [id number]  :   1965  ACCT. NUMBER: [id number]  DATE OF SERVICE: 21  START TIME: 10:00 AM  END TIME: 10:50 AM  FACILITATOR: Shannan Hays [facility]  TOPIC:  RN Group: Health Maintenance  [facility] 55+ Program  TRACK: A2    NUMBER OF PARTICIPANTS: 8    Summary of Group / Topics Discussed:  Health Maintenance: Goal Setting: Meaningful goals can bring a sense of direction and purpose in life.  They also highlight our most important values. Patients were assisted by instructor to identify short term goals to promote their mental health recovery and improve overall health and wellness. Patients were educated on SMART goal setting framework as a strategy to increase outcomes and promote success.    Patient Session Goals / Objectives:  ? Explained the key concepts of SMART goal setting framework  ? Identified three goals successfully using SMART goal setting framework  ? Reviewed concept of balance in wellness as it pertains to goal setting         Patient Participation / Response:  Moderately participated, sharing some personal reflections / insights and adequately adequately received / provided feedback with other participants.    Demonstrated understanding of topics discussed through group discussion and participation and Verbalized understanding of health maintenance topic    Treatment Plan:  Patient has a current master individualized treatment plan.  See Epic treatment plan for more information.    Shannan Hays, LICSW

## 2021-09-08 NOTE — GROUP NOTE
Service Modality:  Video Visit     Telemedicine Visit: The patient's condition can be safely assessed and treated via synchronous audio and visual telemedicine encounter.       Reason for Telemedicine Visit: Services only offered telehealth and due to COVID-19.     Originating Site (Patient Location): Patient's home     Distant Site (Provider Location): Provider Remote Setting- Home Office     Consent:  The patient/guardian has verbally consented to: the potential risks and benefits of telemedicine (video visit) versus in person care; bill my insurance or make self-payment for services provided; and responsibility for payment of non-covered services.      Patient would like the video invitation sent by:  My Chart     Mode of Communication:  Video Conference via Medical Zoom     As the provider I attest to compliance with applicable laws and regulations related to telemedicine.    Process Group Note    PATIENT'S NAME: Craig M Wilfahrt  MRN:   [id number]  :   1965  ACCT. NUMBER: [id number]  DATE OF SERVICE: 21  START TIME:  9:00 AM  END TIME:  9:50 AM  FACILITATOR: Shannan Hays [facility]  TOPIC:  Process Group    Diagnoses:  296.32 (F33.1) Major Depressive Disorder, Recurrent Episode, Moderate          [facility] 55+ Program  TRACK: A2    NUMBER OF PARTICIPANTS: 8          Data:    Session content: At the start of this group, patients were invited to check in by identifying themselves, describing their current emotional status, and identifying issues to address in this group.   Area(s) of treatment focus addressed in this session included Symptom Management, Personal Safety, Community Resources/Discharge Planning, Abstinence/Relapse Prevention, Develop / Improve Independent Living Skills and Develop Socialization / Interpersonal Relationship Skills.    Lam began the 55+ IOP today. He was able to self disclose the mood symptoms and life events leading to his admission with the group. He  reports a hx of depressed and anxious mood. Denies S/I or safety issues. He reports that the medication he is currently taking is stabilizing his mood symptoms. He reports less depressed mood today. He is aware of the emotional triggers including losing a job he was at for 26 years unexpectedly. He is exploring employment opportunities and has a job interview today.    His wife is supportive.    Over the [place]. He reconnected with friends and was able to enjoy his weekend.      Therapeutic Interventions/Treatment Strategies:  Psychotherapist offered support, feedback and validation and reinforced use of skills. Treatment modalities used include Cognitive Behavioral Therapy.    Assessment:    Patient response:   Patient responded to session by accepting feedback, giving feedback, listening, focusing on goals, being attentive, accepting support and verbalizing understanding    Possible barriers to participation / learning include: and no barriers identified    Health Issues:   None reported       Substance Use Review:   Substance Use: No active concerns identified.    Mental Status/Behavioral Observations  Appearance:   Appropriate   Eye Contact:   Good   Psychomotor Behavior: Normal   Attitude:   Cooperative  Interested Pleasant  Orientation:   All  Speech   Rate / Production: Normal    Volume:  Normal   Mood:    Depressed   Affect:    Appropriate  Worrisome   Thought Content:   Clear and Safety denies any current safety concerns including suicidal ideation, self-harm, and homicidal ideation  Thought Form:  Coherent  Goal Directed  Logical     Insight:    Good     Plan:     Safety Plan: No current safety concerns identified.  Recommended that patient call 911 or go to the local ED should there be a change in any of these risk factors.     Barriers to treatment: None identified    Patient Contracts (see media tab):  None    Substance Use: Not addressed in session      Continue or Discharge: Patient will continue in 55+ Program (55+) as planned. Patient is likely to benefit from learning and using skills as they work toward the goals identified in their treatment plan. Lam will continue for mood stabilization and symptom management education for mental health recovery.        Shannan Hays, [facility]  September 8, 2021

## 2021-09-08 NOTE — GROUP NOTE
Psychotherapy Group Note    PATIENT'S NAME: Craig M Wilfahrt  MRN:   [id number]  :   1965  ACCT. NUMBER: [id number]  DATE OF SERVICE: 21  START TIME: 11:00 AM  END TIME: 11:50 AM  FACILITATOR: Ingrid Ricks LICSW  TOPIC: MH EBP Group: Self-Awareness  [facility] 55+ Program  TRACK: A2                              Service Modality:  Video Visit     Telemedicine Visit: The patient's condition can be safely assessed and treated via synchronous audio and visual telemedicine encounter.      Reason for Telemedicine Visit: Services only offered telehealth    Originating Site (Patient Location): Patient's home    Distant Site (Provider Location): [facility] MENTAL HEALTH & ADDICTION SERVICES    Consent:  The patient/guardian has verbally consented to: the potential risks and benefits of telemedicine (video visit) versus in person care; bill my insurance or make self-payment for services provided; and responsibility for payment of non-covered services.     Patient would like the video invitation sent by:  My Chart    Mode of Communication:  Video Conference via Medical Zoom    As the provider I attest to compliance with applicable laws and regulations related to telemedicine.         NUMBER OF PARTICIPANTS: 8    Summary of Group / Topics Discussed:  Self-Awareness: Values: Patients identified personal values by examining development of their current values and how their values influence their daily functioning and life choices. Patients explored the impact of their values on their thoughts, feelings, and actions. Patients discussed definition of personal values and how they develop and change over time. The goal is to help patients reconcile value conflicts and achieve balance and flexibility to improve mood and daily functioning.     Patient Session Goals / Objectives:    Examined development of values and impact of values on functioning    Identified and prioritized important values related to  current well-being     Identified strategies to change or enhance values to positively impact symptoms    Assisted patients to find ways to adapt functioning to better fit their values        Patient Participation / Response:  Fully participated with the group by sharing personal reflections / insights and openly received / provided feedback with other participants.    Demonstrated understanding of topics discussed through group discussion and participation and Demonstrated understanding of values, strengths, and challenges to learn about themselves    Treatment Plan:  Patient has an initial individualized treatment plan that was created as part of their diagnostic assessment / admission process.  A master individualized treatment plan is in the process of being developed with the patient and multi-disciplinary care team.    Ingrid Bright, SONIYASW

## 2021-09-09 ENCOUNTER — HOSPITAL ENCOUNTER (OUTPATIENT)
Dept: BEHAVIORAL HEALTH | Facility: CLINIC | Age: 56
End: 2021-09-09
Attending: PSYCHIATRY & NEUROLOGY
Payer: COMMERCIAL

## 2021-09-09 PROCEDURE — 90853 GROUP PSYCHOTHERAPY: CPT | Mod: 95

## 2021-09-09 PROCEDURE — 90853 GROUP PSYCHOTHERAPY: CPT | Mod: 95 | Performed by: SOCIAL WORKER

## 2021-09-09 NOTE — GROUP NOTE
Service Modality:  Video Visit     Telemedicine Visit: The patient's condition can be safely assessed and treated via synchronous audio and visual telemedicine encounter.       Reason for Telemedicine Visit: Services only offered telehealth and due to COVID-19.     Originating Site (Patient Location): Patient's home     Distant Site (Provider Location): Provider Remote Setting- Home Office     Consent:  The patient/guardian has verbally consented to: the potential risks and benefits of telemedicine (video visit) versus in person care; bill my insurance or make self-payment for services provided; and responsibility for payment of non-covered services.      Patient would like the video invitation sent by:  My Chart     Mode of Communication:  Video Conference via Medical Zoom     As the provider I attest to compliance with applicable laws and regulations related to telemedicine.    Psychotherapy Group Note    PATIENT'S NAME: Craig M Wilfahrt  MRN:   [id number]  :   1965  ACCT. NUMBER: [id number]  DATE OF SERVICE: 21  START TIME: 11:00 AM  END TIME: 11:50 AM  FACILITATOR: Shannan Hays LICSW  TOPIC:  EBP Group: Specialty Awareness  [facility] 55+ Program  TRACK: A2    NUMBER OF PARTICIPANTS: 6    Summary of Group / Topics Discussed:  Specialty Topics: Hope: The topic of hope was presented in order to help patients better understand the symptoms of hopelessness and how to become more hopeful. Patients discussed their current awareness of the topic and relevance to their functioning. Individual experiences with symptoms and treatment options were also discussed. Patients explored options for ongoing/future treatment and symptom management.      Patient Session Goals / Objectives:    Discussed definition of hopelessness    Discussed how hopelessness impacts functioning    Set a plan to utilize skills to reduce hopelessness        Patient Participation / Response:  Fully participated with the  group by sharing personal reflections / insights and openly received / provided feedback with other participants.    Demonstrated understanding of topics discussed through group discussion and participation, Identified / Expressed readiness to act on skill suggestions discussed in topic and Practiced skills in session    Treatment Plan:  Patient has a current master individualized treatment plan.  See Epic treatment plan for more information.    Shannan aHys, LICSW

## 2021-09-09 NOTE — GROUP NOTE
Service Modality:  Video Visit     Telemedicine Visit: The patient's condition can be safely assessed and treated via synchronous audio and visual telemedicine encounter.       Reason for Telemedicine Visit: Services only offered telehealth and due to COVID-19.     Originating Site (Patient Location): Patient's home     Distant Site (Provider Location): Provider Remote Setting- Home Office     Consent:  The patient/guardian has verbally consented to: the potential risks and benefits of telemedicine (video visit) versus in person care; bill my insurance or make self-payment for services provided; and responsibility for payment of non-covered services.      Patient would like the video invitation sent by:  My Chart     Mode of Communication:  Video Conference via Medical Zoom     As the provider I attest to compliance with applicable laws and regulations related to telemedicine.    Process Group Note    PATIENT'S NAME: Craig M Wilfahrt  MRN:   [id number]  :   1965  ACCT. NUMBER: [id number]  DATE OF SERVICE: 21  START TIME:  9:00 AM  END TIME:  9:50 AM  FACILITATOR: Shannan Hays [facility]  TOPIC:  Process Group    Diagnoses:  296.32 (F33.1) Major Depressive Disorder, Recurrent Episode, Moderate          M [facility] 55+ Program  TRACK: A2    NUMBER OF PARTICIPANTS: 5          Data:    Session content: At the start of this group, patients were invited to check in by identifying themselves, describing their current emotional status, and identifying issues to address in this group.   Area(s) of treatment focus addressed in this session included Symptom Management, Personal Safety, Community Resources/Discharge Planning, Abstinence/Relapse Prevention, Develop / Improve Independent Living Skills and Develop Socialization / Interpersonal Relationship Skills.    Lam reports less depressed mood. Denies S/I or safety issues.  He processed his adult children being back at school. His son is a Senior in  High School. His daughter resumed college and they faced timed. It is a tradition that cookies are made on the first day of school. He and his wife made batches of cookies. Lam said his job interview went well yesterday. He will have a second interview next week. He reported on his weekend plans to drive up to [place] to celebrate his niece who is turning [age] years old. He has meaningful domestic goals for today.       Therapeutic Interventions/Treatment Strategies:  Psychotherapist offered support, feedback and validation and reinforced use of skills. Treatment modalities used include Cognitive Behavioral Therapy.    Assessment:    Patient response:   Patient responded to session by accepting feedback, giving feedback, listening, focusing on goals, being attentive, accepting support and verbalizing understanding    Possible barriers to participation / learning include: and no barriers identified    Health Issues:   None reported       Substance Use Review:   Substance Use: No active concerns identified.    Mental Status/Behavioral Observations  Appearance:   Appropriate   Eye Contact:   Good   Psychomotor Behavior: Normal   Attitude:   Cooperative  Interested Pleasant  Orientation:   All  Speech   Rate / Production: Normal    Volume:  Normal   Mood:    Less depressed and less anxious mood  Affect:    Appropriate   Thought Content:   Clear and Safety denies any current safety concerns including suicidal ideation, self-harm, and homicidal ideation  Thought Form:  Coherent  Goal Directed  Logical     Insight:    Good     Plan:     Safety Plan: No current safety concerns identified.  Recommended that patient call 911 or go to the local ED should there be a change in any of these risk factors.     Barriers to treatment: None identified    Patient Contracts (see media tab):  None    Substance Use: Not addressed in session     Continue or Discharge: Patient will continue in 55+ Program (55+) as planned. Patient is likely  to benefit from learning and using skills as they work toward the goals identified in their treatment plan. Lam will continue for mood stabilization and symptom management education for mental health recovery.      Shannan Hays, [facility]  September 9, 2021

## 2021-09-09 NOTE — GROUP NOTE
Psychotherapy Group Note    PATIENT'S NAME: Craig M Wilfahrt  MRN:   [id number]  :   1965  ACCT. NUMBER: [id number]  DATE OF SERVICE: 21  START TIME: 10:00 AM  END TIME: 10:50 AM  FACILITATOR: Ingrid Ricks LICSW  TOPIC: MH EBP Group: Relationship Skills  [facility] 55+ Program  TRACK: A2                              Service Modality:  Video Visit     Telemedicine Visit: The patient's condition can be safely assessed and treated via synchronous audio and visual telemedicine encounter.      Reason for Telemedicine Visit: Services only offered telehealth    Originating Site (Patient Location): Patient's home    Distant Site (Provider Location): [facility] MENTAL HEALTH & ADDICTION SERVICES    Consent:  The patient/guardian has verbally consented to: the potential risks and benefits of telemedicine (video visit) versus in person care; bill my insurance or make self-payment for services provided; and responsibility for payment of non-covered services.     Patient would like the video invitation sent by:  My Chart    Mode of Communication:  Video Conference via Medical Zoom    As the provider I attest to compliance with applicable laws and regulations related to telemedicine.         NUMBER OF PARTICIPANTS: 6    Summary of Group / Topics Discussed:  Relationship Skills: Assertive Communication: Patients were provided with a general overview of assertive communication skills and how practicing assertive communication skills will assist patients in developing healthier and more effective relationships. Patients reviewed their current awareness on ability to practice assertive communication, ways to increase assertive communication, and identified/problem solved barriers to assertive communication.     Patient Session Goals / Objectives:    Identified and discussed patient individual challenges with communication    Presented and practiced effective communication skills in session    Assisted  patients in implementing more effective communication skills in their relationships      Patient Participation / Response:  Fully participated with the group by sharing personal reflections / insights and openly received / provided feedback with other participants.    Demonstrated understanding of topics discussed through group discussion and participation and Demonstrated understanding of relationship skills and communication skills    Treatment Plan:  Patient has an initial individualized treatment plan that was created as part of their diagnostic assessment / admission process.  A master individualized treatment plan is in the process of being developed with the patient and multi-disciplinary care team.    Ingrid Bright, SONIYASW

## 2021-09-09 NOTE — ADDENDUM NOTE
Encounter addended by: Shannan Hays [facility] on: 9/9/2021 1:07 PM   Actions taken: Clinical Note Signed

## 2021-09-10 ENCOUNTER — HOSPITAL ENCOUNTER (OUTPATIENT)
Dept: BEHAVIORAL HEALTH | Facility: CLINIC | Age: 56
End: 2021-09-10
Attending: PSYCHIATRY & NEUROLOGY
Payer: COMMERCIAL

## 2021-09-10 PROCEDURE — 99207 PR CDG-CODE INCORRECT PER BILLING BASED ON TIME: CPT | Performed by: PSYCHIATRY & NEUROLOGY

## 2021-09-10 PROCEDURE — 99215 OFFICE O/P EST HI 40 MIN: CPT | Mod: GT | Performed by: PSYCHIATRY & NEUROLOGY
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
## 2021-09-13 ENCOUNTER — HOSPITAL ENCOUNTER (OUTPATIENT)
Dept: BEHAVIORAL HEALTH | Facility: CLINIC | Age: 56
End: 2021-09-13
Attending: PSYCHIATRY & NEUROLOGY
Payer: COMMERCIAL

## 2021-09-13 PROCEDURE — 90853 GROUP PSYCHOTHERAPY: CPT | Mod: 95

## 2021-09-13 PROCEDURE — 90853 GROUP PSYCHOTHERAPY: CPT | Mod: GT | Performed by: SOCIAL WORKER

## 2021-09-13 NOTE — GROUP NOTE
Service Modality:  Video Visit     Telemedicine Visit: The patient's condition can be safely assessed and treated via synchronous audio and visual telemedicine encounter.       Reason for Telemedicine Visit: Services only offered telehealth and due to COVID-19.     Originating Site (Patient Location): Patient's home     Distant Site (Provider Location): Provider Remote Setting- Home Office     Consent:  The patient/guardian has verbally consented to: the potential risks and benefits of telemedicine (video visit) versus in person care; bill my insurance or make self-payment for services provided; and responsibility for payment of non-covered services.      Patient would like the video invitation sent by:  My Chart     Mode of Communication:  Video Conference via Medical Zoom     As the provider I attest to compliance with applicable laws and regulations related to telemedicine.    Psychoeducation Group Note    PATIENT'S NAME: Craig M Wilfahrt  MRN:   [id number]  :   1965  ACCT. NUMBER: [id number]  DATE OF SERVICE: 21  START TIME: 11:00 AM  END TIME: 11:50 AM  FACILITATOR: Shannan Hays LIC  TOPIC: MH EBP Group: Health Maintenance  [facility] 55+ Program  TRACK: A2    NUMBER OF PARTICIPANTS: 8    Summary of Group / Topics Discussed:  Health Maintenance: Goal Setting: Meaningful goals can bring a sense of direction and purpose in life.  They also highlight our most important values. Patients were assisted by instructor to identify short term goals to promote their mental health recovery and improve overall health and wellness. Patients were educated on SMART goal setting framework as a strategy to increase outcomes and promote success.    Patient Session Goals / Objectives:  ? Explained the key concepts of SMART goal setting framework  ? Identified three goals successfully using SMART goal setting framework  ? Reviewed concept of balance in wellness as it pertains to goal setting         Patient Participation / Response:  Fully participated with the group by sharing personal reflections / insights and openly received / provided feedback with other participants.    Demonstrated understanding of topics discussed through group discussion and participation and Verbalized understanding of health maintenance topic    Treatment Plan:  Patient has a current master individualized treatment plan.  See Epic treatment plan for more information.    Shannan Hays, LICSW

## 2021-09-13 NOTE — GROUP NOTE
Service Modality:  Video Visit     Telemedicine Visit: The patient's condition can be safely assessed and treated via synchronous audio and visual telemedicine encounter.       Reason for Telemedicine Visit: Services only offered telehealth and due to COVID-19.     Originating Site (Patient Location): Patient's home     Distant Site (Provider Location): Provider Remote Setting- Home Office     Consent:  The patient/guardian has verbally consented to: the potential risks and benefits of telemedicine (video visit) versus in person care; bill my insurance or make self-payment for services provided; and responsibility for payment of non-covered services.      Patient would like the video invitation sent by:  My Chart     Mode of Communication:  Video Conference via Medical Zoom     As the provider I attest to compliance with applicable laws and regulations related to telemedicine.    Process Group Note    PATIENT'S NAME: Craig M Wilfahrt  MRN:   [id number]  :   1965  ACCT. NUMBER: [id number]  DATE OF SERVICE: 21  START TIME:  9:00 AM  END TIME:  9:50 AM  FACILITATOR: Shannan Hays [facility]  TOPIC:  Process Group    Diagnoses:  296.32 (F33.1) Major Depressive Disorder, Recurrent Episode, Moderate          [facility] 55+ Program  TRACK: A2    NUMBER OF PARTICIPANTS: 8          Data:    Session content: At the start of this group, patients were invited to check in by identifying themselves, describing their current emotional status, and identifying issues to address in this group.   Area(s) of treatment focus addressed in this session included Symptom Management, Personal Safety, Community Resources/Discharge Planning, Abstinence/Relapse Prevention, Develop / Improve Independent Living Skills and Develop Socialization / Interpersonal Relationship Skills.    Lam reports less depressed mood. Denies S/I or safety issues. He had a nice weekend with family celebrating his one year old niece's  birthday in [place]. Lam continues to cope with his loss of employment. He is exploring job resources and has a second interview with a company. He feels like he could function in a work setting. [employer] [facility] resources given. He is going to make a schedule and has realistic goals.         Therapeutic Interventions/Treatment Strategies:  Psychotherapist offered support, feedback and validation and reinforced use of skills. Treatment modalities used include Cognitive Behavioral Therapy.    Assessment:    Patient response:   Patient responded to session by accepting feedback, listening, focusing on goals, being attentive, accepting support and verbalizing understanding    Possible barriers to participation / learning include: and no barriers identified    Health Issues:   None reported       Substance Use Review:   Substance Use: No active concerns identified.    Mental Status/Behavioral Observations  Appearance:   Appropriate   Eye Contact:   Good   Psychomotor Behavior: Normal   Attitude:   Cooperative  Interested Pleasant  Orientation:   All  Speech   Rate / Production: Normal    Volume:  Normal   Mood:    Less depressed mood  Affect:    Appropriate   Thought Content:   Clear and Safety denies any current safety concerns including suicidal ideation, self-harm, and homicidal ideation  Thought Form:  Coherent  Goal Directed  Logical     Insight:    Good     Plan:     Safety Plan: No current safety concerns identified.  Recommended that patient call 911 or go to the local ED should there be a change in any of these risk factors.     Barriers to treatment: None identified    Patient Contracts (see media tab):  None    Substance Use: Not addressed in session     Continue or Discharge: Patient will continue in 55+ Program (55+) as planned. Patient is likely to benefit from learning and using skills as they work toward the goals identified in their treatment plan. Lam will continue for mood stabilization and symptom  management education for mental health recovery.      Shannan Hays, [facility]  September 13, 2021

## 2021-09-13 NOTE — GROUP NOTE
Psychotherapy Group Note    PATIENT'S NAME: Craig M Wilfahrt  MRN:   [id number]  :   1965  ACCT. NUMBER: [id number]  DATE OF SERVICE: 21  START TIME: 10:00 AM  END TIME: 10:50 AM  FACILITATOR: Ingrid Ricks LICSW  TOPIC: MH EBP Group: Relationship Skills  [facility] 55+ Program  TRACK: A2                              Service Modality:  Video Visit     Telemedicine Visit: The patient's condition can be safely assessed and treated via synchronous audio and visual telemedicine encounter.      Reason for Telemedicine Visit: Services only offered telehealth    Originating Site (Patient Location): Patient's home    Distant Site (Provider Location): [facility]    Consent:  The patient/guardian has verbally consented to: the potential risks and benefits of telemedicine (video visit) versus in person care; bill my insurance or make self-payment for services provided; and responsibility for payment of non-covered services.     Patient would like the video invitation sent by:  My Chart    Mode of Communication:  Video Conference via Medical Zoom    As the provider I attest to compliance with applicable laws and regulations related to telemedicine.         NUMBER OF PARTICIPANTS: 8    Summary of Group / Topics Discussed:  Relationship Skills: Assertive Communication: Patients were provided with a general overview of assertive communication skills and how practicing assertive communication skills will assist patients in developing healthier and more effective relationships. Patients reviewed their current awareness on ability to practice assertive communication, ways to increase assertive communication, and identified/problem solved barriers to assertive communication.     Patient Session Goals / Objectives:    Identified and discussed patient individual challenges with communication    Presented and practiced effective communication skills in session    Assisted  patients in implementing more effective communication skills in their relationships      Patient Participation / Response:  Fully participated with the group by sharing personal reflections / insights and openly received / provided feedback with other participants.    Demonstrated understanding of topics discussed through group discussion and participation and Demonstrated understanding of relationship skills and communication skills    Treatment Plan:  Patient has a current master individualized treatment plan.  See Epic treatment plan for more information.    Ingrid Bright, LICSW

## 2021-09-13 NOTE — PROGRESS NOTES
Acknowledgement of Current Treatment Plan       I have reviewed my treatment plan with my therapist, LENA Ramos LICSW.   I agree with the plan as it is written in the electronic health record. (A-2 Track)      Name:      Signature:  Craig M Wilfahrt         Reviewed on 9/13. Unable to sign.   Dr Sven Castillo MD  Psychiatrist      Ingrid Ricks, [facility]  Psychotherapist        LENA Ramos LICSW   Psychotherapist   LENA Ramos, [facility]

## 2021-09-15 ENCOUNTER — HOSPITAL ENCOUNTER (OUTPATIENT)
Dept: BEHAVIORAL HEALTH | Facility: CLINIC | Age: 56
End: 2021-09-15
Attending: PSYCHIATRY & NEUROLOGY
Payer: COMMERCIAL

## 2021-09-15 PROCEDURE — 90853 GROUP PSYCHOTHERAPY: CPT | Mod: GT

## 2021-09-15 PROCEDURE — 90853 GROUP PSYCHOTHERAPY: CPT | Mod: 95 | Performed by: SOCIAL WORKER

## 2021-09-15 PROCEDURE — 90853 GROUP PSYCHOTHERAPY: CPT | Mod: GT | Performed by: SOCIAL WORKER

## 2021-09-15 NOTE — GROUP NOTE
Psychotherapy Group Note    PATIENT'S NAME: Craig M Wilfahrt  MRN:   [id number]  :   1965  ACCT. NUMBER: [id number]  DATE OF SERVICE: 9/15/21  START TIME: 11:00 AM  END TIME: 11:50 AM  FACILITATOR: Ingrid Ricks LICSW  TOPIC: MH EBP Group: Emotions Management  [facility] 55+ Program  TRACK: A2                              Service Modality:  Video Visit     Telemedicine Visit: The patient's condition can be safely assessed and treated via synchronous audio and visual telemedicine encounter.      Reason for Telemedicine Visit: Services only offered telehealth    Originating Site (Patient Location): Patient's home    Distant Site (Provider Location): [facility] MENTAL HEALTH & ADDICTION SERVICES    Consent:  The patient/guardian has verbally consented to: the potential risks and benefits of telemedicine (video visit) versus in person care; bill my insurance or make self-payment for services provided; and responsibility for payment of non-covered services.     Patient would like the video invitation sent by:  My Chart    Mode of Communication:  Video Conference via Medical Zoom    As the provider I attest to compliance with applicable laws and regulations related to telemedicine.         NUMBER OF PARTICIPANTS: 6    Summary of Group / Topics Discussed:  Emotions Management: Understanding Emotions: Patients discussed the purpose of emotions and function they serve in our lives.  Reviewed core emotions, why they happen (triggers), and how they occur. The group assisted one anothers' understanding that: emotional experiences are important; difficult emotions have a place in our lives; and the differences between various emotions.    Patient Session Goals / Objectives:    Demonstrate understanding of types various emotions    Identify and discuss specific emotions and when they occur; understand triggers    Discuss barriers to emotional regulation      Patient Participation / Response:  Fully  participated with the group by sharing personal reflections / insights and openly received / provided feedback with other participants.    Demonstrated understanding of topics discussed through group discussion and participation    Treatment Plan:  Patient has a current master individualized treatment plan.  See Epic treatment plan for more information.    Ingrid Bright, LICSW
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
## 2021-09-15 NOTE — GROUP NOTE
Service Modality:  Video Visit     Telemedicine Visit: The patient's condition can be safely assessed and treated via synchronous audio and visual telemedicine encounter.       Reason for Telemedicine Visit: Services only offered telehealth and due to COVID-19.     Originating Site (Patient Location): Patient's home     Distant Site (Provider Location): Provider Remote Setting- Home Office     Consent:  The patient/guardian has verbally consented to: the potential risks and benefits of telemedicine (video visit) versus in person care; bill my insurance or make self-payment for services provided; and responsibility for payment of non-covered services.      Patient would like the video invitation sent by:  My Chart     Mode of Communication:  Video Conference via Medical Zoom     As the provider I attest to compliance with applicable laws and regulations related to telemedicine.    Psychoeducation Group Note    PATIENT'S NAME: Craig M Wilfahrt  MRN:   [id number]  :   1965  ACCT. NUMBER: [id number]  DATE OF SERVICE: 9/15/21  START TIME: 10:00 AM  END TIME: 10:50 AM  FACILITATOR: Shannan Hays [facility]  TOPIC: MH Life Skills Group: Lifestyle Balance and Structure  [facility] 55+ Program  TRACK: A2    NUMBER OF PARTICIPANTS: 5    Summary of Group / Topics Discussed:  Lifestyle Balance and Strucure:  Benefits of Leisure on Mental Health: Patients explored and learned about the benefits and possibilities of leisure activity to create lifestyle balance that supports their mental and physical wellbeing.  Patients were assisted to identify individualized leisure values and interests, recognized the benefits of leisure activity on mental health, and problem solved barriers to leisure engagement and strategies to overcome.  Patient engaged in an experiential leisure activity to gain self-awareness and build milieu social aspects and reflected on the impact the experiential activity had on their mood.        Patient Session Goals / Objectives:    Increased awareness of the importance of engagement in leisure activities to support lifestyle balance and perceived quality of life    Identified strategies to recognize and challenge barriers to leisure participation     Facilitated exploration of meaningful leisure interests and values    Practiced and reflected on how to generalize taught skills to their everyday life        Patient Participation / Response:  Fully participated with the group by sharing personal reflections / insights and openly received / provided feedback with other participants.    Verbalized understanding of content    Treatment Plan:  Patient has a current master individualized treatment plan.  See Epic treatment plan for more information.    Shannan Hays, LICSW

## 2021-09-16 ENCOUNTER — HOSPITAL ENCOUNTER (OUTPATIENT)
Dept: BEHAVIORAL HEALTH | Facility: CLINIC | Age: 56
End: 2021-09-16
Attending: PSYCHIATRY & NEUROLOGY
Payer: COMMERCIAL

## 2021-09-16 PROCEDURE — 90853 GROUP PSYCHOTHERAPY: CPT | Mod: GT | Performed by: SOCIAL WORKER

## 2021-09-16 PROCEDURE — 90853 GROUP PSYCHOTHERAPY: CPT | Mod: GT

## 2021-09-16 NOTE — GROUP NOTE
Psychotherapy Group Note    PATIENT'S NAME: Craig M Wilfahrt  MRN:   [id number]  :   1965  ACCT. NUMBER: [id number]  DATE OF SERVICE: 21  START TIME: 10:00 AM  END TIME: 10:50 AM  FACILITATOR: Ingrid Ricks LICSW  TOPIC: MH EBP Group: Coping Skills  [facility] 55+ Program  TRACK: A2                              Service Modality:  Video Visit     Telemedicine Visit: The patient's condition can be safely assessed and treated via synchronous audio and visual telemedicine encounter.      Reason for Telemedicine Visit: Services only offered telehealth    Originating Site (Patient Location): Patient's home    Distant Site (Provider Location): [facility] MENTAL HEALTH & ADDICTION SERVICES    Consent:  The patient/guardian has verbally consented to: the potential risks and benefits of telemedicine (video visit) versus in person care; bill my insurance or make self-payment for services provided; and responsibility for payment of non-covered services.     Patient would like the video invitation sent by:  My Chart    Mode of Communication:  Video Conference via Medical Zoom    As the provider I attest to compliance with applicable laws and regulations related to telemedicine.         NUMBER OF PARTICIPANTS: 5    Summary of Group / Topics Discussed:  Coping Skills: Grounding: Patients discussed and practiced strategies to increase attachment / presence to the current moment.  Patients identified situations in which using these strategies will help improve emotion regulation sense of calm in the body.  Reviewed the benefits of applying grounding strategies, as well as past / current practices of each member.  Patients identified situations in which using these strategies would reduce stress. They developed the ability to distinguish when these strategies can be useful in their lives for management and stress and psychological well-being.    Patient Session Goals / Objectives:    Understand the  purpose of using grounding strategies to reduce stress.    Verbalize understanding of how and when to apply grounding strategies to reduce distress and increase presence in the moment.    Review patients current grounding practices and discuss a more formal way of practicing and accessing skills.    Practice using various calming strategies (e.g. 5-4-3-2-1; mental and body awareness).    Choose 1-2 grounding strategies to apply during times of distress.        Patient Participation / Response:  Fully participated with the group by sharing personal reflections / insights and openly received / provided feedback with other participants.    Demonstrated understanding of topics discussed through group discussion and participation    Treatment Plan:  Patient has a current master individualized treatment plan.  See Epic treatment plan for more information.    Ingrid Bright, LICSW

## 2021-09-16 NOTE — GROUP NOTE
Service Modality:  Video Visit     Telemedicine Visit: The patient's condition can be safely assessed and treated via synchronous audio and visual telemedicine encounter.       Reason for Telemedicine Visit: Services only offered telehealth and due to COVID-19.     Originating Site (Patient Location): Patient's home     Distant Site (Provider Location): Provider Remote Setting- Home Office     Consent:  The patient/guardian has verbally consented to: the potential risks and benefits of telemedicine (video visit) versus in person care; bill my insurance or make self-payment for services provided; and responsibility for payment of non-covered services.      Patient would like the video invitation sent by:  My Chart     Mode of Communication:  Video Conference via Medical Zoom     As the provider I attest to compliance with applicable laws and regulations related to telemedicine.      Process Group Note    PATIENT'S NAME: Craig M Wilfahrt  MRN:   [id number]  :   1965  ACCT. NUMBER: [id number]  DATE OF SERVICE: 21  START TIME:  9:00 AM  END TIME:  9:50 AM  FACILITATOR: Shannan Hays [facility]  TOPIC:  Process Group    Diagnoses:  296.32 (F33.1) Major Depressive Disorder, Recurrent Episode, Moderate          M [facility] 55+ Program  TRACK: A2    NUMBER OF PARTICIPANTS: 5          Data:    Session content: At the start of this group, patients were invited to check in by identifying themselves, describing their current emotional status, and identifying issues to address in this group.   Area(s) of treatment focus addressed in this session included Symptom Management, Personal Safety, Community Resources/Discharge Planning, Abstinence/Relapse Prevention, Develop / Improve Independent Living Skills and Develop Socialization / Interpersonal Relationship Skills.    Lam  Reports mood is improved. He reports less depressed and less anxious mood. He has an increase in motivation. Denies S/I or safety  issues. He is looking forward to his second interview of his week today. 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        Therapeutic Interventions/Treatment Strategies:  Psychotherapist offered support, feedback and validation and reinforced use of skills. Treatment modalities used include Cognitive Behavioral Therapy.    Assessment:    Patient response:   Patient responded to session by accepting feedback, listening, focusing on goals, being attentive and verbalizing understanding    Possible barriers to participation / learning include: and no barriers identified    Health Issues:   None reported       Substance Use Review:   Substance Use: No active concerns identified.    Mental Status/Behavioral Observations  Appearance:   Appropriate   Eye Contact:   Good   Psychomotor Behavior: Normal   Attitude:   Cooperative  Interested Pleasant  Orientation:   All  Speech   Rate / Production: Normal    Volume:  Normal   Mood:    Less depressed and less anxious mood   Affect:    Appropriate   Thought Content:   Clear and Safety denies any current safety concerns including suicidal ideation, self-harm, and homicidal ideation  Thought Form:  Coherent  Goal Directed  Logical     Insight:    Good     Plan:     Safety Plan: No current safety concerns identified.  Recommended that patient call 911 or go to the local ED should there be a change in any of these risk factors.     Barriers to treatment: None identified    Patient Contracts (see media tab):  None    Substance Use: Not addressed in session     Continue or Discharge: Patient will continue in 55+ Program (55+) as planned. Patient is likely to benefit from learning and using skills as they work toward the goals identified in their treatment plan.  Lam will continue for mood stabilization and symptom management education for mental health management.      Shannan Hays,  [facility]  September 16, 2021

## 2021-09-20 ENCOUNTER — HOSPITAL ENCOUNTER (OUTPATIENT)
Dept: BEHAVIORAL HEALTH | Facility: CLINIC | Age: 56
End: 2021-09-20
Attending: PSYCHIATRY & NEUROLOGY
Payer: COMMERCIAL

## 2021-09-20 PROCEDURE — 90853 GROUP PSYCHOTHERAPY: CPT | Mod: GT

## 2021-09-20 NOTE — GROUP NOTE
Process Group Note    PATIENT'S NAME: Craig M Wilfahrt  MRN:   [id number]  :   1965  ACCT. NUMBER: [id number]  DATE OF SERVICE: 21  START TIME:  9:00 AM  END TIME:  9:50 AM  FACILITATOR: Ingrid Ricks LICSW  TOPIC:  Process Group    Diagnoses:  296.32 (F33.1) Major Depressive Disorder, Recurrent Episode, Moderate           [facility] 55+ Program  TRACK: A2                              Service Modality:  Video Visit     Telemedicine Visit: The patient's condition can be safely assessed and treated via synchronous audio and visual telemedicine encounter.      Reason for Telemedicine Visit: Services only offered telehealth    Originating Site (Patient Location): Patient's home    Distant Site (Provider Location): [facility] MENTAL HEALTH & ADDICTION SERVICES    Consent:  The patient/guardian has verbally consented to: the potential risks and benefits of telemedicine (video visit) versus in person care; bill my insurance or make self-payment for services provided; and responsibility for payment of non-covered services.     Patient would like the video invitation sent by:  My Chart    Mode of Communication:  Video Conference via Medical Zoom    As the provider I attest to compliance with applicable laws and regulations related to telemedicine.         NUMBER OF PARTICIPANTS: 9          Data:    Session content: At the start of this group, patients were invited to check in by identifying themselves, describing their current emotional status, and identifying issues to address in this group.   Area(s) of treatment focus addressed in this session included Symptom Management and Personal Safety.  Pt reports feeling positive and less depressed today. He received and accepted a job offer. He feels that it will be a good match for him and will start in 2 weeks. Pt reports an enjoyable social weekend. Denies safety concerns.    Therapeutic Interventions/Treatment Strategies:  Psychotherapist  offered support, feedback and validation and reinforced use of skills.    Assessment:    Patient response:   Patient responded to session by accepting feedback, listening, focusing on goals, being attentive and accepting support    Possible barriers to participation / learning include: and no barriers identified    Health Issues:   None reported       Substance Use Review:   Substance Use: No active concerns identified.    Mental Status/Behavioral Observations  Appearance:   Appropriate   Eye Contact:   Good   Psychomotor Behavior: Normal   Attitude:   Cooperative   Orientation:   All  Speech   Rate / Production: Normal    Volume:  Normal   Mood:    Normal  Affect:    Appropriate   Thought Content:   Clear  Thought Form:  Coherent  Logical     Insight:    Good     Plan:     Safety Plan: No current safety concerns identified.  Recommended that patient call 911 or go to the local ED should there be a change in any of these risk factors.     Barriers to treatment: None identified    Patient Contracts (see media tab):  None    Substance Use: Not addressed in session     Continue or Discharge: Patient will continue in 55+ Program (55+) as planned. Patient is likely to benefit from learning and using skills as they work toward the goals identified in their treatment plan.      Ingrid Bright, [facility]  September 20, 2021

## 2021-09-20 NOTE — GROUP NOTE
Psychotherapy Group Note    PATIENT'S NAME: Craig M Wilfahrt  MRN:   [id number]  :   1965  ACCT. NUMBER: [id number]  DATE OF SERVICE: 21  START TIME: 10:00 AM  END TIME: 10:50 AM  FACILITATOR: Ingrid Ricks LICSW  TOPIC: MH EBP Group: Cognitive Restructuring  [facility] 55+ Program  TRACK: A2                              Service Modality:  Video Visit     Telemedicine Visit: The patient's condition can be safely assessed and treated via synchronous audio and visual telemedicine encounter.      Reason for Telemedicine Visit: Services only offered telehealth    Originating Site (Patient Location): Patient's home    Distant Site (Provider Location): [facility] MENTAL HEALTH & ADDICTION SERVICES    Consent:  The patient/guardian has verbally consented to: the potential risks and benefits of telemedicine (video visit) versus in person care; bill my insurance or make self-payment for services provided; and responsibility for payment of non-covered services.     Patient would like the video invitation sent by:  My Chart    Mode of Communication:  Video Conference via Medical Zoom    As the provider I attest to compliance with applicable laws and regulations related to telemedicine.         NUMBER OF PARTICIPANTS: 9    Summary of Group / Topics Discussed:  Cognitive Restructuring: Distortions: Patients received an overview of how to identify common cognitive distortions. Patients will explore alternatives to cognitive distortions and practice challenging their negative thought patterns. The goal is to help patients target modify ineffective thought patterns.     Patient Session Goals / Objectives:    Familiarized self with ineffective / unhealthy thoughts and how they develop.      Explored impact of ineffective thoughts / distortions on mood and activity    Formulated new neutral/positive alternatives to challenge less helpful / ineffective thoughts.    Practiced and plan to apply in daily  life               Patient Participation / Response:  Fully participated with the group by sharing personal reflections / insights and openly received / provided feedback with other participants.    Demonstrated understanding of topics discussed through group discussion and participation, Identified / Expressed personal readiness to practice CBT and Verbalized understanding of patient's own thought patterns and how they impact their mood and behaviors    Treatment Plan:  Patient has a current master individualized treatment plan.  See Epic treatment plan for more information.    Ingrid Bright, LICSW

## 2021-09-22 ENCOUNTER — HOSPITAL ENCOUNTER (OUTPATIENT)
Dept: BEHAVIORAL HEALTH | Facility: CLINIC | Age: 56
End: 2021-09-22
Attending: PSYCHIATRY & NEUROLOGY
Payer: COMMERCIAL

## 2021-09-22 PROCEDURE — 90853 GROUP PSYCHOTHERAPY: CPT | Mod: GT

## 2021-09-22 PROCEDURE — 90853 GROUP PSYCHOTHERAPY: CPT | Mod: 95 | Performed by: SOCIAL WORKER

## 2021-09-22 PROCEDURE — 90853 GROUP PSYCHOTHERAPY: CPT | Mod: GT | Performed by: SOCIAL WORKER

## 2021-09-22 NOTE — GROUP NOTE
Psychotherapy Group Note    PATIENT'S NAME: Craig M Wilfahrt  MRN:   [id number]  :   1965  ACCT. NUMBER: [id number]  DATE OF SERVICE: 21  START TIME: 11:00 AM  END TIME: 11:50 AM  FACILITATOR: Ingrid Ricks LICSW  TOPIC: MH EBP Group: Cognitive Restructuring  [facility] 55+ Program  TRACK: A2                              Service Modality:  Video Visit     Telemedicine Visit: The patient's condition can be safely assessed and treated via synchronous audio and visual telemedicine encounter.      Reason for Telemedicine Visit: Services only offered telehealth    Originating Site (Patient Location): Patient's home    Distant Site (Provider Location): [facility] MENTAL HEALTH & ADDICTION SERVICES    Consent:  The patient/guardian has verbally consented to: the potential risks and benefits of telemedicine (video visit) versus in person care; bill my insurance or make self-payment for services provided; and responsibility for payment of non-covered services.     Patient would like the video invitation sent by:  My Chart    Mode of Communication:  Video Conference via Medical Zoom    As the provider I attest to compliance with applicable laws and regulations related to telemedicine.         NUMBER OF PARTICIPANTS: 9    Summary of Group / Topics Discussed:  Cognitive Restructuring: Distortions Part 2: Patients received an overview of how to identify common cognitive distortions. Patients will explore alternatives to cognitive distortions and practice challenging their negative thought patterns. The goal is to help patients target modify ineffective thought patterns.     Patient Session Goals / Objectives:    Familiarized self with ineffective / unhealthy thoughts and how they develop.      Explored impact of ineffective thoughts / distortions on mood and activity    Formulated new neutral/positive alternatives to challenge less helpful / ineffective thoughts.    Practiced and plan to apply in  daily life               Patient Participation / Response:  Fully participated with the group by sharing personal reflections / insights and openly received / provided feedback with other participants.    Demonstrated understanding of topics discussed through group discussion and participation and Expressed understanding of the relationship between behaviors, thoughts, and feelings    Treatment Plan:  Patient has a current master individualized treatment plan.  See Epic treatment plan for more information.    Ingrid Bright, LICSW

## 2021-09-22 NOTE — GROUP NOTE
Service Modality:  Video Visit     Telemedicine Visit: The patient's condition can be safely assessed and treated via synchronous audio and visual telemedicine encounter.       Reason for Telemedicine Visit: Services only offered telehealth and due to COVID-19.     Originating Site (Patient Location): Patient's home     Distant Site (Provider Location): Provider Remote Setting- Home Office     Consent:  The patient/guardian has verbally consented to: the potential risks and benefits of telemedicine (video visit) versus in person care; bill my insurance or make self-payment for services provided; and responsibility for payment of non-covered services.      Patient would like the video invitation sent by:  My Chart     Mode of Communication:  Video Conference via Medical Zoom     As the provider I attest to compliance with applicable laws and regulations related to telemedicine.    Process Group Note    PATIENT'S NAME: Craig M Wilfahrt  MRN:   [id number]  :   1965  ACCT. NUMBER: [id number]  DATE OF SERVICE: 21  START TIME:  9:00 AM  END TIME:  9:50 AM  FACILITATOR: Shannan Hays [facility]  TOPIC:  Process Group    Diagnoses:  296.32 (F33.1) Major Depressive Disorder, Recurrent Episode, Moderate          M [facility] 55+ Program  TRACK: A2    NUMBER OF PARTICIPANTS: 9          Data:    Session content: At the start of this group, patients were invited to check in by identifying themselves, describing their current emotional status, and identifying issues to address in this group.   Area(s) of treatment focus addressed in this session included Symptom Management, Personal Safety, Community Resources/Discharge Planning, Abstinence/Relapse Prevention, Develop / Improve Independent Living Skills and Develop Socialization / Interpersonal Relationship Skills.    Lam reports mood is improved. He reports less depressed mood. Denies S/I or safety issues. He reports medication compliance. He was offered  employment for the role of . He will start within the next two weeks. We discussed a relapse prevention plan for this transition. His wife is supportive.       Therapeutic Interventions/Treatment Strategies:  Psychotherapist offered support, feedback and validation and reinforced use of skills. Treatment modalities used include Cognitive Behavioral Therapy.    Assessment:    Patient response:   Patient responded to session by accepting feedback, giving feedback, listening, focusing on goals, being attentive and verbalizing understanding    Possible barriers to participation / learning include: and no barriers identified    Health Issues:   Yes: Had dentist appointment       Substance Use Review:   Substance Use: No active concerns identified.    Mental Status/Behavioral Observations  Appearance:   Appropriate   Eye Contact:   Good   Psychomotor Behavior: Normal   Attitude:   Cooperative  Interested Pleasant  Orientation:   All  Speech   Rate / Production: Normal    Volume:  Normal   Mood:    Normal Mood is improved. Less depressed mood. More hopeful.  Affect:    Appropriate   Thought Content:   Clear and Safety denies any current safety concerns including suicidal ideation, self-harm, and homicidal ideation  Thought Form:  Coherent  Goal Directed  Logical     Insight:    Good     Plan:     Safety Plan: No current safety concerns identified.  Recommended that patient call 911 or go to the local ED should there be a change in any of these risk factors.     Barriers to treatment: None identified    Patient Contracts (see media tab):  None    Substance Use: Not addressed in session     Continue or Discharge: Patient will continue in 55+ Program (55+) as planned. Patient is likely to benefit from learning and using skills as they work toward the goals identified in their treatment plan.      Shannan Hays, [facility]  September 22, 2021

## 2021-09-22 NOTE — GROUP NOTE
Service Modality:  Video Visit     Telemedicine Visit: The patient's condition can be safely assessed and treated via synchronous audio and visual telemedicine encounter.       Reason for Telemedicine Visit: Services only offered telehealth and due to COVID-19.     Originating Site (Patient Location): Patient's home     Distant Site (Provider Location): Provider Remote Setting- Home Office     Consent:  The patient/guardian has verbally consented to: the potential risks and benefits of telemedicine (video visit) versus in person care; bill my insurance or make self-payment for services provided; and responsibility for payment of non-covered services.      Patient would like the video invitation sent by:  My Chart     Mode of Communication:  Video Conference via Medical Zoom     As the provider I attest to compliance with applicable laws and regulations related to telemedicine.    Psychoeducation Group Note    PATIENT'S NAME: Craig M Wilfahrt  MRN:   [id number]  :   1965  ACCT. NUMBER: [id number]  DATE OF SERVICE: 21  START TIME: 10:00 AM  END TIME: 10:50 AM  FACILITATOR: Shannan Hays [facility]  TOPIC:  EBP Group: Mental Health Maintenance  [facility] 55+ Program  TRACK: 9    NUMBER OF PARTICIPANTS: 9    Summary of Group / Topics Discussed:  Mental Health Maintenance:  Letting Go of Stress: Patients learned the physiology of the fight/flight response; the importance of listening to these biological cues to get physical and emotional needs met. Pt's learned effective techniques to release stress and regain the homeostasis in the body.     Patient Session Goals / Objectives:  ? Patients discovered quick, easy and effective stress reduction techniques  ? Patients named triggers to stress and identified one technique they will use to cope with symptoms          Patient Participation / Response:  Moderately participated, sharing some personal reflections / insights and adequately adequately  received / provided feedback with other participants.    Demonstrated understanding of topics discussed through group discussion and participation and Verbalized understanding of mental health maintenance topic    Treatment Plan:  Patient has a current master individualized treatment plan.  See Epic treatment plan for more information.    Shannan Hays, LICSW

## 2021-09-22 NOTE — PROGRESS NOTES
Patient Active Problem List   Diagnosis     Moderate major depression, single episode (H)     Adjustment disorder with anxiety     MDD (major depressive disorder), recurrent episode, moderate (H)       Current Outpatient Medications:      atorvastatin (LIPITOR) 20 MG tablet, Take 20 mg by mouth daily, Disp: , Rfl:      lisinopril (ZESTRIL) 10 MG tablet, Take 10 mg by mouth daily, Disp: , Rfl:      venlafaxine (EFFEXOR-XR) 75 MG 24 hr capsule, , Disp: , Rfl:   Psychiatry staffing: case discussed  Diagnosis:  As above;  Recent job offer, doing well.

## 2021-09-23 ENCOUNTER — HOSPITAL ENCOUNTER (OUTPATIENT)
Dept: BEHAVIORAL HEALTH | Facility: CLINIC | Age: 56
End: 2021-09-23
Attending: PSYCHIATRY & NEUROLOGY
Payer: COMMERCIAL

## 2021-09-23 PROCEDURE — 90853 GROUP PSYCHOTHERAPY: CPT | Mod: GT | Performed by: SOCIAL WORKER

## 2021-09-23 PROCEDURE — 90853 GROUP PSYCHOTHERAPY: CPT | Mod: GT

## 2021-09-23 NOTE — GROUP NOTE
Service Modality:  Video Visit     Telemedicine Visit: The patient's condition can be safely assessed and treated via synchronous audio and visual telemedicine encounter.       Reason for Telemedicine Visit: Services only offered telehealth and due to COVID-19.     Originating Site (Patient Location): Patient's home     Distant Site (Provider Location): Provider Remote Setting- Home Office     Consent:  The patient/guardian has verbally consented to: the potential risks and benefits of telemedicine (video visit) versus in person care; bill my insurance or make self-payment for services provided; and responsibility for payment of non-covered services.      Patient would like the video invitation sent by:  My Chart     Mode of Communication:  Video Conference via Medical Zoom     As the provider I attest to compliance with applicable laws and regulations related to telemedicine.    Psychotherapy Group Note    PATIENT'S NAME: Craig M Wilfahrt  MRN:   [id number]  :   1965  ACCT. NUMBER: [id number]  DATE OF SERVICE: 21  START TIME: 11:00 AM  END TIME: 11:50 AM  FACILITATOR: Shannan Hays LICSW  TOPIC:  EBP Group: Relationship Skills  [facility] 55+ Program  TRACK: A2    NUMBER OF PARTICIPANTS: 6    Summary of Group / Topics Discussed:  Relationship Skills: Relationship Mapping: Patients identified different types of relationships they have in their life and examined if there is conflict or closeness, the degree of conflict or closeness, and the reason for conflict. The goal of this topic is to help patients gain awareness of the relationships they have with others, identify types of conflict in patients  lives and how they impact symptoms/functioning, and identify how they can improve relationships with relationship and interpersonal skills they have learned.     Patient Session Goals / Objectives:    Familiarized patients with awareness to the different types of relationships they may have  with different people in their lives    Discussed and practiced strategies to promote healthier understanding of interpersonal relationships with a focus on awareness of conflict, the causes of conflict, and the ways to transform conflict    Discussed the role of support and needs to assert within relationships.         Patient Participation / Response:  Moderately participated, sharing some personal reflections / insights and adequately adequately received / provided feedback with other participants.    Demonstrated understanding of topics discussed through group discussion and participation, Demonstrated understanding of relationship skills and communication skills and Practiced skills in session    Treatment Plan:  Patient has a current master individualized treatment plan.  See Epic treatment plan for more information.    Shannan Hays, [facility]

## 2021-09-23 NOTE — GROUP NOTE
Psychotherapy Group Note    PATIENT'S NAME: Craig M Wilfahrt  MRN:   [id number]  :   1965  ACCT. NUMBER: [id number]  DATE OF SERVICE: 21  START TIME: 10:00 AM  END TIME: 10:50 AM  FACILITATOR: Ingrid Ricks LICSW  TOPIC: MH EBP Group: Coping Skills  [facility] 55+ Program  TRACK: A2                              Service Modality:  Video Visit     Telemedicine Visit: The patient's condition can be safely assessed and treated via synchronous audio and visual telemedicine encounter.      Reason for Telemedicine Visit: Services only offered telehealth    Originating Site (Patient Location): Patient's home    Distant Site (Provider Location): [facility] MENTAL HEALTH & ADDICTION SERVICES    Consent:  The patient/guardian has verbally consented to: the potential risks and benefits of telemedicine (video visit) versus in person care; bill my insurance or make self-payment for services provided; and responsibility for payment of non-covered services.     Patient would like the video invitation sent by:  My Chart    Mode of Communication:  Video Conference via Medical Zoom    As the provider I attest to compliance with applicable laws and regulations related to telemedicine.         NUMBER OF PARTICIPANTS: 7    Summary of Group / Topics Discussed:  Coping Skills: Humor:  Patients discussed the importance of humor and laughter.  Reviewed the benefits of applying the aforementioned coping strategies.  Patients explored how these strategies might be applied to daily stressors or distressing situations.    Patient Session Goals / Objectives:    Understand the purpose and benefits of applying humor     Identify ways to increase humor daily    Address barriers to utilizing coping skills when in distress.        Patient Participation / Response:  Fully participated with the group by sharing personal reflections / insights and openly received / provided feedback with other participants.    Demonstrated  understanding of topics discussed through group discussion and participation and Expressed understanding of the relevance / importance of coping skills at distressing times in life    Treatment Plan:  Patient has a current master individualized treatment plan.  See Epic treatment plan for more information.    Ingrid Bright, LICSW

## 2021-09-23 NOTE — GROUP NOTE
Service Modality:  Video Visit     Telemedicine Visit: The patient's condition can be safely assessed and treated via synchronous audio and visual telemedicine encounter.       Reason for Telemedicine Visit: Services only offered telehealth and due to COVID-19.     Originating Site (Patient Location): Patient's home     Distant Site (Provider Location): Provider Remote Setting- Home Office     Consent:  The patient/guardian has verbally consented to: the potential risks and benefits of telemedicine (video visit) versus in person care; bill my insurance or make self-payment for services provided; and responsibility for payment of non-covered services.      Patient would like the video invitation sent by:  My Chart     Mode of Communication:  Video Conference via Medical Zoom     As the provider I attest to compliance with applicable laws and regulations related to telemedicine.    Process Group Note    PATIENT'S NAME: Craig M Wilfahrt  MRN:   [id number]  :   1965  ACCT. NUMBER: [id number]  DATE OF SERVICE: 21  START TIME:  9:00 AM  END TIME:  9:50 AM  FACILITATOR: Shannan Hays [facility]  TOPIC:  Process Group    Diagnoses:  296.32 (F33.1) Major Depressive Disorder, Recurrent Episode, Moderate          M [facility] 55+ Program  TRACK: A2    NUMBER OF PARTICIPANTS: 7          Data:    Session content: At the start of this group, patients were invited to check in by identifying themselves, describing their current emotional status, and identifying issues to address in this group.   Area(s) of treatment focus addressed in this session included Symptom Management, Personal Safety, Community Resources/Discharge Planning, Abstinence/Relapse Prevention, Develop / Improve Independent Living Skills and Develop Socialization / Interpersonal Relationship Skills.    Naresh reports anxious mood. The start date for his new job is extended for a few weeks. He and his wife discussed ways to structure his time.  He will be going to his cabin over the weekend.   Denies S/I or safety issues. Reports medication compliance.      Therapeutic Interventions/Treatment Strategies:  Psychotherapist offered support, feedback and validation and reinforced use of skills. Treatment modalities used include Cognitive Behavioral Therapy.    Assessment:    Patient response:   Patient responded to session by accepting feedback, listening, focusing on goals, being attentive, accepting support and verbalizing understanding    Possible barriers to participation / learning include: and no barriers identified    Health Issues:   None reported       Substance Use Review:   Substance Use: No active concerns identified. 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 Denies alcohol abuse.    Mental Status/Behavioral Observations  Appearance:   Appropriate   Eye Contact:   Good   Psychomotor Behavior: Normal   Attitude:   Cooperative  Interested Pleasant  Orientation:   All  Speech   Rate / Production: Normal    Volume:  Normal   Mood:    Anxious mood Less depressed mood  Affect:    Appropriate  Worrisome   Thought Content:   Clear and Safety denies any current safety concerns including suicidal ideation, self-harm, and homicidal ideation  Thought Form:  Coherent  Goal Directed  Logical     Insight:    Good     Plan:     Safety Plan: No current safety concerns identified.  Recommended that patient call 911 or go to the local ED should there be a change in any of these risk factors.     Barriers to treatment: None identified    Patient Contracts (see media tab):  None    Substance Use: Not addressed in session     Continue or Discharge: Patient will continue in 55+ Program (55+) as planned. Patient is likely to benefit from learning and using skills as they work toward the goals identified in their treatment plan.  Lam will continue for mood stabilization and symptom management education for mental health recovery.      Shannan Hays, [facility]  September 23,  2021

## 2021-09-27 ENCOUNTER — HOSPITAL ENCOUNTER (OUTPATIENT)
Dept: BEHAVIORAL HEALTH | Facility: CLINIC | Age: 56
End: 2021-09-27
Attending: PSYCHIATRY & NEUROLOGY
Payer: COMMERCIAL

## 2021-09-27 PROCEDURE — 90853 GROUP PSYCHOTHERAPY: CPT | Mod: GT

## 2021-09-27 PROCEDURE — 90853 GROUP PSYCHOTHERAPY: CPT | Mod: GT | Performed by: COUNSELOR

## 2021-09-27 NOTE — GROUP NOTE
Psychoeducation Group Note    PATIENT'S NAME: Craig M Wilfahrt  MRN:   [id number]  :   1965  ACCT. NUMBER: [id number]  DATE OF SERVICE: 21  START TIME: 11:00 AM  END TIME: 11:50 AM  FACILITATOR: Bj Silva LMFT  TOPIC: KACEY RN Group: Health Maintenance  [facility] 55+ Program  TRACK: A2    NUMBER OF PARTICIPANTS: 8    Summary of Group / Topics Discussed:  Health Maintenance: Goal Setting: Meaningful goals can bring a sense of direction and purpose in life.  They also highlight our most important values. Patients were assisted by instructor to identify short term goals to promote their mental health recovery and improve overall health and wellness. Patients were educated on SMART goal setting framework as a strategy to increase outcomes and promote success.    Patient Session Goals / Objectives:  ? Explained the key concepts of SMART goal setting framework  ? Identified three goals successfully using SMART goal setting framework  ? Reviewed concept of balance in wellness as it pertains to goal setting      Service Modality:  Video Visit     Telemedicine Visit: The patient's condition can be safely assessed and treated via synchronous audio and visual telemedicine encounter.      Reason for Telemedicine Visit: Services only offered telehealth and due to COVID-19.    Originating Site (Patient Location): Patient's home    Distant Site (Provider Location): Provider Remote Setting- Home Office    Consent:  The patient/guardian has verbally consented to: the potential risks and benefits of telemedicine (video visit) versus in person care; bill my insurance or make self-payment for services provided; and responsibility for payment of non-covered services.     Patient would like the video invitation sent by:  My Chart    Mode of Communication:  Video Conference via Medical Zoom    As the provider I attest to compliance with applicable laws and regulations related to telemedicine.      Patient  Participation / Response:  Fully participated with the group by sharing personal reflections / insights and openly received / provided feedback with other participants.    Demonstrated understanding of topics discussed through group discussion and participation, Identified / Expressed personal readiness to practice skills and Verbalized understanding of health maintenance topic    Treatment Plan:  Patient has a current master individualized treatment plan.  See Epic treatment plan for more information.    Bj Silva, YADIRAFT

## 2021-09-27 NOTE — GROUP NOTE
"Process Group Note    PATIENT'S NAME: Craig M Wilfahrt  MRN:   [id number]  :   1965  ACCT. NUMBER: [id number]  DATE OF SERVICE: 21  START TIME:  9:00 AM  END TIME:  9:50 AM  FACILITATOR: Ingrid Ricks LICSW  TOPIC:  Process Group    Diagnoses:  296.32 (F33.1) Major Depressive Disorder, Recurrent Episode, Moderate           [facility] 55+ Program  TRACK: A2                              Service Modality:  Video Visit     Telemedicine Visit: The patient's condition can be safely assessed and treated via synchronous audio and visual telemedicine encounter.      Reason for Telemedicine Visit: Services only offered telehealth    Originating Site (Patient Location): Patient's home    Distant Site (Provider Location): [facility] MENTAL HEALTH & ADDICTION SERVICES    Consent:  The patient/guardian has verbally consented to: the potential risks and benefits of telemedicine (video visit) versus in person care; bill my insurance or make self-payment for services provided; and responsibility for payment of non-covered services.     Patient would like the video invitation sent by:  My Chart    Mode of Communication:  Video Conference via Medical Zoom    As the provider I attest to compliance with applicable laws and regulations related to telemedicine.         NUMBER OF PARTICIPANTS: 8          Data:    Session content: At the start of this group, patients were invited to check in by identifying themselves, describing their current emotional status, and identifying issues to address in this group.   Area(s) of treatment focus addressed in this session included Symptom Management and Personal Safety.  Pt reports feeling worried about his father, who is in the hospital. He has been visiting him and will continue to be involved. Pt is trying to \"take one day at a time\". He reports coping by doing yard work and attending a volley ball game. He feels positive about a postponement of his start date at " his new job. Denies safety concerns.    Therapeutic Interventions/Treatment Strategies:  Psychotherapist offered support, feedback and validation and reinforced use of skills.    Assessment:    Patient response:   Patient responded to session by accepting feedback, giving feedback, listening, focusing on goals, being attentive and accepting support    Possible barriers to participation / learning include: and no barriers identified    Health Issues:   None reported       Substance Use Review:   Substance Use: No active concerns identified.    Mental Status/Behavioral Observations  Appearance:   Appropriate   Eye Contact:   Good   Psychomotor Behavior: Normal   Attitude:   Cooperative   Orientation:   All  Speech   Rate / Production: Normal    Volume:  Normal   Mood:    Anxious   Affect:    Appropriate   Thought Content:   Clear  Thought Form:  Coherent  Logical     Insight:    Good     Plan:     Safety Plan: No current safety concerns identified.  Recommended that patient call 911 or go to the local ED should there be a change in any of these risk factors.     Barriers to treatment: None identified    Patient Contracts (see media tab):  None    Substance Use: Not addressed in session     Continue or Discharge: Patient will continue in 55+ Program (55+) as planned. Patient is likely to benefit from learning and using skills as they work toward the goals identified in their treatment plan.      Ingrid Bright, LICSW  September 27, 2021

## 2021-09-27 NOTE — GROUP NOTE
Psychotherapy Group Note    PATIENT'S NAME: Craig M Wilfahrt  MRN:   [id number]  :   1965  ACCT. NUMBER: [id number]  DATE OF SERVICE: 21  START TIME: 10:00 AM  END TIME: 10:50 AM  FACILITATOR: Ingrid Ricks LICSW  TOPIC: MH EBP Group: Relationship Skills  [facility] 55+ Program  TRACK: A2                              Service Modality:  Video Visit     Telemedicine Visit: The patient's condition can be safely assessed and treated via synchronous audio and visual telemedicine encounter.      Reason for Telemedicine Visit: Services only offered telehealth    Originating Site (Patient Location): Patient's home    Distant Site (Provider Location): [facility] MENTAL HEALTH & ADDICTION SERVICES    Consent:  The patient/guardian has verbally consented to: the potential risks and benefits of telemedicine (video visit) versus in person care; bill my insurance or make self-payment for services provided; and responsibility for payment of non-covered services.     Patient would like the video invitation sent by:  My Chart    Mode of Communication:  Video Conference via Medical Zoom    As the provider I attest to compliance with applicable laws and regulations related to telemedicine.         NUMBER OF PARTICIPANTS: 8    Summary of Group / Topics Discussed:  Relationship Skills: Boundaries: Patients were provided with a general overview of interpersonal boundaries and how lack of boundaries relates to symptoms and functioning. The purpose is to help patients identify boundary issues and gain awareness and skills to work towards healthier interpersonal boundaries. Current awareness of healthy boundary characteristics and barriers to establishing healthy boundaries were discussed.    Patient Session Goals / Objectives:    Familiarized patients with the concept of interpersonal boundaries and their characteristics    Discussed and practiced strategies to promote healthier interpersonal  boundaries    Identified boundary issues and identified plan to improve boundaries      Patient Participation / Response:  Fully participated with the group by sharing personal reflections / insights and openly received / provided feedback with other participants.    Demonstrated understanding of topics discussed through group discussion and participation    Treatment Plan:  Patient has a current master individualized treatment plan.  See Epic treatment plan for more information.    Ingrid Bright, LICSW

## 2021-09-29 ENCOUNTER — HOSPITAL ENCOUNTER (OUTPATIENT)
Dept: BEHAVIORAL HEALTH | Facility: CLINIC | Age: 56
End: 2021-09-29
Attending: PSYCHIATRY & NEUROLOGY
Payer: COMMERCIAL

## 2021-09-29 PROCEDURE — 90853 GROUP PSYCHOTHERAPY: CPT | Mod: GT | Performed by: SOCIAL WORKER

## 2021-09-29 PROCEDURE — 90853 GROUP PSYCHOTHERAPY: CPT | Mod: GT

## 2021-09-29 NOTE — GROUP NOTE
Service Modality:  Video Visit     Telemedicine Visit: The patient's condition can be safely assessed and treated via synchronous audio and visual telemedicine encounter.       Reason for Telemedicine Visit: Services only offered telehealth and due to COVID-19.     Originating Site (Patient Location): Patient's home     Distant Site (Provider Location): Provider Remote Setting- Home Office     Consent:  The patient/guardian has verbally consented to: the potential risks and benefits of telemedicine (video visit) versus in person care; bill my insurance or make self-payment for services provided; and responsibility for payment of non-covered services.      Patient would like the video invitation sent by:  My Chart     Mode of Communication:  Video Conference via Medical Zoom     As the provider I attest to compliance with applicable laws and regulations related to telemedicine.    Psychotherapy Group Note    PATIENT'S NAME: Craig M Wilfahrt  MRN:   [id number]  :   1965  ACCT. NUMBER: [id number]  DATE OF SERVICE: 21  START TIME: 10:00 AM  END TIME: 10:50 AM  FACILITATOR: Shannan Hays [facility]  TOPIC:  EBP Group: Self-Awareness  [facility] 55+ Program  TRACK: A2    NUMBER OF PARTICIPANTS: 8    Summary of Group / Topics Discussed:  Self-Awareness: Personal Strengths: Topic focused on assisting patients in identifying personal strengths and how they relate to the management of mental health symptoms. Patients discussed the benefits of acknowledging their personal strengths and their impact on mood improvement, mindfulness, and perspective. Patients worked to increase time spent on recognition and appreciation of what is positive and working in their lives. The goal is to reduce rumination and negative thinking resulting in increased mindfulness and resilience. Patients will work to put skills into practice and problem-solve barriers.     Patient Session Goals / Objectives:    Identified  personal strengths    Identified barriers to recognition of personal strengths    Verbalized understanding of strategies to increase use of their strengths in management of daily symptoms      Patient Participation / Response:  Fully participated with the group by sharing personal reflections / insights and openly received / provided feedback with other participants.    Demonstrated understanding of topics discussed through group discussion and participation, Demonstrated understanding of values, strengths, and challenges to learn about themselves and Practiced skills in session    Treatment Plan:  Patient has a current master individualized treatment plan.  See Epic treatment plan for more information.    Shannan Hays, LICSW

## 2021-09-29 NOTE — GROUP NOTE
Service Modality:  Video Visit     Telemedicine Visit: The patient's condition can be safely assessed and treated via synchronous audio and visual telemedicine encounter.       Reason for Telemedicine Visit: Services only offered telehealth and due to COVID-19.     Originating Site (Patient Location): Patient's home     Distant Site (Provider Location): Provider Remote Setting- Home Office     Consent:  The patient/guardian has verbally consented to: the potential risks and benefits of telemedicine (video visit) versus in person care; bill my insurance or make self-payment for services provided; and responsibility for payment of non-covered services.      Patient would like the video invitation sent by:  My Chart     Mode of Communication:  Video Conference via Medical Zoom     As the provider I attest to compliance with applicable laws and regulations related to telemedicine.    Process Group Note    PATIENT'S NAME: Craig M Wilfahrt  MRN:   [id number]  :   1965  ACCT. NUMBER: [id number]  DATE OF SERVICE: 21  START TIME:  9:00 AM  END TIME:  9:50 AM  FACILITATOR: Shannan Hays [facility]  TOPIC:  Process Group    Diagnoses:  296.32 (F33.1) Major Depressive Disorder, Recurrent Episode, Moderate          [facility] 55+ Program  TRACK: A2    NUMBER OF PARTICIPANTS: 8          Data:    Session content: At the start of this group, patients were invited to check in by identifying themselves, describing their current emotional status, and identifying issues to address in this group.   Area(s) of treatment focus addressed in this session included Symptom Management, Personal Safety, Community Resources/Discharge Planning, Abstinence/Relapse Prevention, Develop / Improve Independent Living Skills, Develop Socialization / Interpersonal Relationship Skills and Cultural / Spirituality.    Lam reports sad mood. Denies S/I or safety issues. He processed his father being hospitalized with walking  Pneumonia. He has increased communication about his father's health care and health wishes with his siblings. One sister would like for him to come home with services while the other sister would like for him to find more supportive services out of the home. Lam is aware of coping skills. He continues to talk with his new employer about his start date.      Therapeutic Interventions/Treatment Strategies:  Psychotherapist offered support, feedback and validation and reinforced use of skills. Treatment modalities used include Cognitive Behavioral Therapy.    Assessment:    Patient response:   Patient responded to session by accepting feedback, giving feedback, listening, focusing on goals, being attentive, accepting support and verbalizing understanding    Possible barriers to participation / learning include: and no barriers identified    Health Issues:   None reported       Substance Use Review:   Substance Use: No active concerns identified.    Mental Status/Behavioral Observations  Appearance:   Appropriate   Eye Contact:   Good   Psychomotor Behavior: Normal   Attitude:   Cooperative  Interested Pleasant  Orientation:   All  Speech   Rate / Production: Normal    Volume:  Normal   Mood:    Anxious  Sad   Affect:    Appropriate  Worrisome   Thought Content:   Clear and Safety denies any current safety concerns including suicidal ideation, self-harm, and homicidal ideation  Thought Form:  Coherent  Goal Directed  Logical     Insight:    Good     Plan:     Safety Plan: No current safety concerns identified.  Recommended that patient call 911 or go to the local ED should there be a change in any of these risk factors.     Barriers to treatment: None identified    Patient Contracts (see media tab):  None    Substance Use: Not addressed in session     Continue or Discharge: Patient will continue in 55+ Program (55+) as planned. Patient is likely to benefit from learning and using skills as they work toward the goals  identified in their treatment plan.  Lam will continue for mood stabilization and symptom management education for mental health management.      Shannan Hays, [facility]  September 29, 2021

## 2021-09-30 ENCOUNTER — HOSPITAL ENCOUNTER (OUTPATIENT)
Dept: BEHAVIORAL HEALTH | Facility: CLINIC | Age: 56
End: 2021-09-30
Attending: PSYCHIATRY & NEUROLOGY
Payer: COMMERCIAL

## 2021-09-30 PROCEDURE — 90853 GROUP PSYCHOTHERAPY: CPT | Mod: 95 | Performed by: SOCIAL WORKER

## 2021-09-30 PROCEDURE — 90853 GROUP PSYCHOTHERAPY: CPT | Mod: GT | Performed by: SOCIAL WORKER

## 2021-09-30 PROCEDURE — 90853 GROUP PSYCHOTHERAPY: CPT | Mod: 95

## 2021-09-30 NOTE — GROUP NOTE
Psychotherapy Group Note    PATIENT'S NAME: Craig M Wilfahrt  MRN:   [id number]  :   1965  ACCT. NUMBER: [id number]  DATE OF SERVICE: 21  START TIME: 11:00 AM  END TIME: 11:50 AM  FACILITATOR: Ingrid Ricks LICSW  TOPIC: MH EBP Group: Relationship Skills  [facility] 55+ Program  TRACK: A2                              Service Modality:  Video Visit     Telemedicine Visit: The patient's condition can be safely assessed and treated via synchronous audio and visual telemedicine encounter.      Reason for Telemedicine Visit: Services only offered telehealth    Originating Site (Patient Location): Patient's home    Distant Site (Provider Location): [facility] MENTAL HEALTH & ADDICTION SERVICES    Consent:  The patient/guardian has verbally consented to: the potential risks and benefits of telemedicine (video visit) versus in person care; bill my insurance or make self-payment for services provided; and responsibility for payment of non-covered services.     Patient would like the video invitation sent by:  My Chart    Mode of Communication:  Video Conference via Medical Zoom    As the provider I attest to compliance with applicable laws and regulations related to telemedicine.         NUMBER OF PARTICIPANTS: 8    Summary of Group / Topics Discussed:  Relationship Skills: Boundaries part 2: Patients were provided with a general overview of interpersonal boundaries and how lack of boundaries relates to symptoms and functioning. The purpose is to help patients identify boundary issues and gain awareness and skills to work towards healthier interpersonal boundaries. Current awareness of healthy boundary characteristics and barriers to establishing healthy boundaries were discussed.    Patient Session Goals / Objectives:    Familiarized patients with the concept of interpersonal boundaries and their characteristics    Discussed and practiced strategies to promote healthier interpersonal  boundaries    Identified boundary issues and identified plan to improve boundaries      Patient Participation / Response:  Fully participated with the group by sharing personal reflections / insights and openly received / provided feedback with other participants.    Demonstrated understanding of topics discussed through group discussion and participation and Demonstrated understanding of relationship skills and communication skills    Treatment Plan:  Patient has a current master individualized treatment plan.  See Epic treatment plan for more information.    Ingrid Bright, LICSW

## 2021-09-30 NOTE — GROUP NOTE
Service Modality:  Video Visit     Telemedicine Visit: The patient's condition can be safely assessed and treated via synchronous audio and visual telemedicine encounter.       Reason for Telemedicine Visit: Services only offered telehealth and due to COVID-19.     Originating Site (Patient Location): Patient's home     Distant Site (Provider Location): Provider Remote Setting- Home Office     Consent:  The patient/guardian has verbally consented to: the potential risks and benefits of telemedicine (video visit) versus in person care; bill my insurance or make self-payment for services provided; and responsibility for payment of non-covered services.      Patient would like the video invitation sent by:  My Chart     Mode of Communication:  Video Conference via Medical Zoom     As the provider I attest to compliance with applicable laws and regulations related to telemedicine.    Psychotherapy Group Note    PATIENT'S NAME: Craig M Wilfahrt  MRN:   [id number]  :   1965  ACCT. NUMBER: [id number]  DATE OF SERVICE: 21  START TIME: 11:00 AM  END TIME: 11:50 AM  FACILITATOR: Shannan Hays [facility]  TOPIC:  EBP Group: Coping Skills  [facility] 55+ Program  TRACK: A2    NUMBER OF PARTICIPANTS: 7    Summary of Group / Topics Discussed:  Coping Skills: Self-Soothe: Patients learned to apply self-soothe as a way to decrease heightened stress in the moment.  Patients identified situations that necessitate self-soothe strategies.  They focused on ways to manage physical symptoms of distress using the senses. They discussed how to distinguish when this can be useful in their lives when other strategies are more relevant or helpful.    Patient Session Goals / Objectives:    Understand the purpose of using the senses to decrease distress    Process what happens in the body when using self-soothe strategies    Demonstrate understanding of when to use self-soothe strategies    Identify and problem solve  barriers to applying self-soothe strategies.    Choose 1-2 self-soothe strategies to apply during times of distress.        Patient Participation / Response:  Fully participated with the group by sharing personal reflections / insights and openly received / provided feedback with other participants.    Demonstrated understanding of topics discussed through group discussion and participation and Practiced 2-3 new coping skills in session    Treatment Plan:  Patient has a current master individualized treatment plan.  See Epic treatment plan for more information.    Shannan Hays, LICSW

## 2021-09-30 NOTE — GROUP NOTE
Service Modality:  Video Visit     Telemedicine Visit: The patient's condition can be safely assessed and treated via synchronous audio and visual telemedicine encounter.       Reason for Telemedicine Visit: Services only offered telehealth and due to COVID-19.     Originating Site (Patient Location): Patient's home     Distant Site (Provider Location): Provider Remote Setting- Home Office     Consent:  The patient/guardian has verbally consented to: the potential risks and benefits of telemedicine (video visit) versus in person care; bill my insurance or make self-payment for services provided; and responsibility for payment of non-covered services.      Patient would like the video invitation sent by:  My Chart     Mode of Communication:  Video Conference via Medical Zoom     As the provider I attest to compliance with applicable laws and regulations related to telemedicine.    Process Group Note    PATIENT'S NAME: Craig M Wilfahrt  MRN:   [id number]  :   1965  ACCT. NUMBER: [id number]  DATE OF SERVICE: 21  START TIME:  9:00 AM  END TIME:  9:50 AM  FACILITATOR: Shannan Hays [facility]  TOPIC:  Process Group    Diagnoses:  296.32 (F33.1) Major Depressive Disorder, Recurrent Episode, Moderate          [facility] 55+ Program  TRACK: A2    NUMBER OF PARTICIPANTS: 8          Data:    Session content: At the start of this group, patients were invited to check in by identifying themselves, describing their current emotional status, and identifying issues to address in this group.   Area(s) of treatment focus addressed in this session included Symptom Management, Personal Safety, Community Resources/Discharge Planning, Abstinence/Relapse Prevention, Develop / Improve Independent Living Skills and Develop Socialization / Interpersonal Relationship Skills.    Lam reports anxious mood. Denies S/I or safety issues. He processed the transition of going back to work on Monday. He has a recovery plan  and resources ([email or web address] Lam continues to communicate with his wife. They will be driving to [place] a [place]. He will not attend IOP on Monday but will attend the rest of the treatment days next week. Lam is aware of coping skills for symptom management.       Therapeutic Interventions/Treatment Strategies:  Psychotherapist offered support, feedback and validation and reinforced use of skills. Treatment modalities used include Cognitive Behavioral Therapy.    Assessment:    Patient response:   Patient responded to session by accepting feedback, giving feedback, listening, focusing on goals, being attentive and verbalizing understanding    Possible barriers to participation / learning include: and no barriers identified    Health Issues:   None reported       Substance Use Review:   Substance Use: No active concerns identified.    Mental Status/Behavioral Observations  Appearance:   Appropriate   Eye Contact:   Good   Psychomotor Behavior: Normal   Attitude:   Cooperative  Interested Pleasant  Orientation:   All  Speech   Rate / Production: Normal    Volume:  Normal   Mood:    Anxious   Affect:    Appropriate   Thought Content:   Clear and Safety denies any current safety concerns including suicidal ideation, self-harm, and homicidal ideation  Thought Form:  Coherent  Goal Directed  Logical     Insight:    Good     Plan:     Safety Plan: No current safety concerns identified.  Recommended that patient call 911 or go to the local ED should there be a change in any of these risk factors.     Barriers to treatment: None identified    Patient Contracts (see media tab):  None    Substance Use: Not addressed in session     Continue or Discharge: Patient will continue in 55+ Program (55+) as planned. Patient is likely to benefit from learning and using skills as they work toward the goals identified in their treatment plan. Lam will continue for mood stabilization and symptom management education  for mental health recovery.      Shannan Hays, [facility]  September 30, 2021

## 2021-09-30 NOTE — GROUP NOTE
Psychotherapy Group Note    PATIENT'S NAME: Craig M Wilfahrt  MRN:   [id number]  :   1965  ACCT. NUMBER: [id number]  DATE OF SERVICE: 21  START TIME: 10:00 AM  END TIME: 10:50 AM  FACILITATOR: Ingrid Ricks LICSW  TOPIC: MH EBP Group: Coping Skills  [facility]+ Program  TRACK: A2    NUMBER OF PARTICIPANTS: 8                              Service Modality:  Video Visit     Telemedicine Visit: The patient's condition can be safely assessed and treated via synchronous audio and visual telemedicine encounter.      Reason for Telemedicine Visit: Services only offered telehealth    Originating Site (Patient Location): Patient's home    Distant Site (Provider Location): [facility]    Consent:  The patient/guardian has verbally consented to: the potential risks and benefits of telemedicine (video visit) versus in person care; bill my insurance or make self-payment for services provided; and responsibility for payment of non-covered services.     Patient would like the video invitation sent by:  My Chart    Mode of Communication:  Video Conference via Medical Zoom    As the provider I attest to compliance with applicable laws and regulations related to telemedicine.         Summary of Group / Topics Discussed:  Coping Skills: Radical Acceptance: Patients learned radical acceptance as a way to tolerate heightened stress in the moment.  Patients identified situations that necessitate radical acceptance.  They focused on applying acceptance of the moment to tolerate difficult emotions and events. Patients discussed how to distinguish when this can be useful in their lives and when other skills are more relevant or helpful.    Patient Session Goals / Objectives:    Understand that some amount of pain exists for each of us in our lives    Process the difficulty of acceptance in our lives and benefits of radical acceptance to mood and functioning.    Demonstrate  understanding of when to use the radical acceptance to tolerate distress by providing examples of situations where this could be applied.    Identify barriers to applying radical acceptance to difficult situations and explore strategies to overcome them        Patient Participation / Response:  Fully participated with the group by sharing personal reflections / insights and openly received / provided feedback with other participants.    Demonstrated understanding of topics discussed through group discussion and participation and Expressed understanding of the relevance / importance of coping skills at distressing times in life    Treatment Plan:  Patient has a current master individualized treatment plan.  See Epic treatment plan for more information.    Ingrid Bright, LICSW

## 2021-10-10 ENCOUNTER — HEALTH MAINTENANCE LETTER (OUTPATIENT)
Age: 56
End: 2021-10-10

## 2022-01-30 ENCOUNTER — HEALTH MAINTENANCE LETTER (OUTPATIENT)
Age: 57
End: 2022-01-30

## 2022-09-24 ENCOUNTER — HEALTH MAINTENANCE LETTER (OUTPATIENT)
Age: 57
End: 2022-09-24

## 2023-05-08 ENCOUNTER — HEALTH MAINTENANCE LETTER (OUTPATIENT)
Age: 58
End: 2023-05-08

## 2024-05-03 ENCOUNTER — TRANSFERRED RECORDS (OUTPATIENT)
Dept: HEALTH INFORMATION MANAGEMENT | Facility: CLINIC | Age: 59
End: 2024-05-03

## 2024-07-10 ENCOUNTER — TELEPHONE (OUTPATIENT)
Dept: BEHAVIORAL HEALTH | Facility: CLINIC | Age: 59
End: 2024-07-10
Payer: COMMERCIAL

## 2024-07-10 NOTE — TELEPHONE ENCOUNTER
"Pt is a(n) adult (18+ out of HS) Seeking as eval for Adult Mental Health DA for Programmatic Care - Program Preference? No.  Appointment scheduled by:  Other  (no cost estimate)  Caller name:  Kristy Wilfahrt  wife  Caller phone #: [phone]       needed?  NO    Contact information verified/updated: Yes    If appt is for adult [facility] program location, confirm you have verified the location and address with the patient referring to the template header.  Yes    Christie Alfaro    \"We have scheduled your evaluation. In the event that your insurance coverage comes back as out of network, you may receive a call to cancel your appointment and direct you to your insurance company for in-network coverage.\"    Disclaimer regarding insurance read to patient?  Yes   "

## 2024-07-11 ENCOUNTER — VIRTUAL VISIT (OUTPATIENT)
Dept: BEHAVIORAL HEALTH | Facility: CLINIC | Age: 59
End: 2024-07-11
Payer: COMMERCIAL

## 2024-07-11 ENCOUNTER — TELEPHONE (OUTPATIENT)
Dept: BEHAVIORAL HEALTH | Facility: CLINIC | Age: 59
End: 2024-07-11
Payer: COMMERCIAL

## 2024-07-11 DIAGNOSIS — F33.1 DEPRESSION, MAJOR, RECURRENT, MODERATE (H): Primary | ICD-10-CM

## 2024-07-11 PROCEDURE — 90791 PSYCH DIAGNOSTIC EVALUATION: CPT | Performed by: SOCIAL WORKER

## 2024-07-11 PROCEDURE — 90791 PSYCH DIAGNOSTIC EVALUATION: CPT | Mod: 95 | Performed by: SOCIAL WORKER

## 2024-07-11 ASSESSMENT — COLUMBIA-SUICIDE SEVERITY RATING SCALE - C-SSRS
TOTAL  NUMBER OF ABORTED OR SELF INTERRUPTED ATTEMPTS LIFETIME: NO
6. HAVE YOU EVER DONE ANYTHING, STARTED TO DO ANYTHING, OR PREPARED TO DO ANYTHING TO END YOUR LIFE?: NO
ATTEMPT LIFETIME: NO
TOTAL  NUMBER OF INTERRUPTED ATTEMPTS LIFETIME: NO
1. HAVE YOU WISHED YOU WERE DEAD OR WISHED YOU COULD GO TO SLEEP AND NOT WAKE UP?: NO
2. HAVE YOU ACTUALLY HAD ANY THOUGHTS OF KILLING YOURSELF?: NO

## 2024-07-11 ASSESSMENT — ANXIETY QUESTIONNAIRES
GAD7 TOTAL SCORE: 20
GAD7 TOTAL SCORE: 20
7. FEELING AFRAID AS IF SOMETHING AWFUL MIGHT HAPPEN: MORE THAN HALF THE DAYS
2. NOT BEING ABLE TO STOP OR CONTROL WORRYING: NEARLY EVERY DAY
1. FEELING NERVOUS, ANXIOUS, OR ON EDGE: NEARLY EVERY DAY
4. TROUBLE RELAXING: NEARLY EVERY DAY
7. FEELING AFRAID AS IF SOMETHING AWFUL MIGHT HAPPEN: MORE THAN HALF THE DAYS
GAD7 TOTAL SCORE: 20
3. WORRYING TOO MUCH ABOUT DIFFERENT THINGS: NEARLY EVERY DAY
6. BECOMING EASILY ANNOYED OR IRRITABLE: NEARLY EVERY DAY
5. BEING SO RESTLESS THAT IT IS HARD TO SIT STILL: NEARLY EVERY DAY
8. IF YOU CHECKED OFF ANY PROBLEMS, HOW DIFFICULT HAVE THESE MADE IT FOR YOU TO DO YOUR WORK, TAKE CARE OF THINGS AT HOME, OR GET ALONG WITH OTHER PEOPLE?: VERY DIFFICULT
IF YOU CHECKED OFF ANY PROBLEMS ON THIS QUESTIONNAIRE, HOW DIFFICULT HAVE THESE PROBLEMS MADE IT FOR YOU TO DO YOUR WORK, TAKE CARE OF THINGS AT HOME, OR GET ALONG WITH OTHER PEOPLE: VERY DIFFICULT

## 2024-07-11 ASSESSMENT — PATIENT HEALTH QUESTIONNAIRE - PHQ9
SUM OF ALL RESPONSES TO PHQ QUESTIONS 1-9: 23
10. IF YOU CHECKED OFF ANY PROBLEMS, HOW DIFFICULT HAVE THESE PROBLEMS MADE IT FOR YOU TO DO YOUR WORK, TAKE CARE OF THINGS AT HOME, OR GET ALONG WITH OTHER PEOPLE: EXTREMELY DIFFICULT
SUM OF ALL RESPONSES TO PHQ QUESTIONS 1-9: 23

## 2024-07-11 NOTE — CONFIDENTIAL NOTE
Summary of Patient Care Communication Handoff to Patient Navigator Coordinator    PATIENT'S NAME: Craig M Wilfahrt  MRN:   [id number]  :   1965    DATE OF SERVICE: 24    Referral Needed: Yes    Is the patient coming from an inpatient unit? No    What program is this referral for? Adult Mental Health Referral    Level of Care Recommended:  Partial Hospitalization Program (PHP)    Specialty Track Recommendations:  MH Specialty Tracks: 55 Plus    Schedule Preferences: Schedule Preference:  No schedule preference (Mornings or Afternoons)    Are there any potential barriers for entrance into programmatic care? Transportation    Followed up from  Needed?:  No    Mental Health Referral Needed: Yes: 55+IOP    Release of Information Needed:  SHANEL Needed: Other:  none    Faxing Needed: No    Follow up Requests:  Patient Navigator Coordinator Follow-Up Needed: Referral to designated [facility].    Comments: Referral to 55+IOP, pt aware all programming is in person. He lives in [facility] but family willing to assist with transportation if needed    Kat Tilley [facility]        Patient Navigator Coordinator Contact Information  Pool Message: [email or web address]-[email or web address] ([phone])   Phone:  [phone]  Fax:  [phone]  Email:  [email or web address]

## 2024-07-11 NOTE — PROGRESS NOTES
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 yourself or others. Or, you think you will.  You feel confused or are having trouble thinking or remembering.  You are having delusions (false beliefs).  You are hearing voices or seeing things that aren t there.  You are feeling psychotic (paranoid, fearful, restless, agitated, nervous, racing thoughts or speech)    Crisis Resources   The EmPath is an adults only unit located at [facility] is a short term (generally less than 23 hour stay) designed for crisis intervention and stabilization. Pts have the opportunity to meet quickly with a behavioral health team for evaluation in a calm and peaceful therapuetic environment. To be evaluated for admission pts are triaged throught the [facility] ED.     The Behavioral Evaluation Center (BEC) is located at the [facility].The BEC is open to ages and provides a comprehensive behavioral health evaluation to those in crisis. Patients typically stay 24-36 hours.     The following hotlines are for both adults and children. The and are open 24 hours a day, 7 days a week unless noted otherwise.      Crisis Lines        Gambling Hotline  4.488.838.3634  [hope]        línea de crisis española  052.077.4457        [facility] Helpline  [phone]        National Hope Line  6.172.315.5562 [hope]        National Suicide Prevention Lifeline: text 988        The Benny Project (LGBTQ Youth Crisis Line)  0.333.577.4923  text START to 795-968        [place]'s Crisis Line  0.875.135.4140 (Press 1)  or text 658199        [facility] [place] Crisis  [phone]      [facility] Mobile Crisis  [phone]      [facility] Crisis  [phone]      [facility] Mobile Crisis  [phone] (adults)  [phone] (children)      [facility] Mobile Crisis  [phone] (adults)  [phone] (children)      [facility] Mobile Crisis  [phone]      [facility] Mobile Crisis  [phone]    Community Resources      Fast Tracker  Linking people to mental health and substance use disorder resources  fasttrackermn.org        National Wirt on Mental Illness (DARON)  396.042.9321 or 1.888.DARON.HELPS  https://namimn.org/        [facility] Urgent Care for Adult Mental Health  [facility] residents [address]  [phone]        [facility]  Free mental health counseling  https://walkin.org/  612.870.0565 X2    Mental Health Apps      Calm Harm  https://calmharm.co.uk/      My3  https://my3app.org/      [place] Safety Plan  https://www.mysafetyplan.org/

## 2024-07-14 ENCOUNTER — HEALTH MAINTENANCE LETTER (OUTPATIENT)
Age: 59
End: 2024-07-14

## 2024-09-10 ENCOUNTER — TELEPHONE (OUTPATIENT)
Dept: BEHAVIORAL HEALTH | Facility: CLINIC | Age: 59
End: 2024-09-10

## 2024-09-10 ENCOUNTER — TELEPHONE (OUTPATIENT)
Dept: BEHAVIORAL HEALTH | Facility: CLINIC | Age: 59
End: 2024-09-10
Payer: COMMERCIAL

## 2024-09-10 PROCEDURE — 99207 PR NO CHARGE LOS: CPT | Performed by: SOCIAL WORKER

## 2024-09-10 NOTE — CONFIDENTIAL NOTE
Summary of Patient Care Communication Handoff to Patient Navigator Coordinator    PATIENT'S NAME: Craig M Wilfahrt  MRN:   [id number]  :   1965    DATE OF SERVICE: 2024    Referral Needed: Yes    Is the patient coming from an inpatient unit? No    What program is this referral for? Adult Mental Health Referral    Level of Care Recommended:  Partial Hospitalization Program (PHP)    Specialty Track Recommendations:  MH Specialty Tracks: 55 Plus    Schedule Preferences: Schedule Preference:  No schedule preference (Mornings or Afternoons)    Are there any potential barriers for entrance into programmatic care? none    Followed up from  Needed?:  No    Mental Health Referral Needed: No    Release of Information Needed:  SHANEL Needed: Other:  NA    Faxing Needed: No    Follow up Requests:  Patient Navigator Coordinator Follow-Up Needed: Referral to designated [facility].    Comments: 55+ IOP/PHP    Kat Tilley [facility]        Patient Navigator Coordinator Contact Information  Pool Message: dept-[email or web address]-patientnavigator ([id number])   Phone:  [phone]  Fax:  [phone]  Email:  [email or web address]

## 2024-09-11 ENCOUNTER — TELEPHONE (OUTPATIENT)
Dept: BEHAVIORAL HEALTH | Facility: CLINIC | Age: 59
End: 2024-09-11
Payer: COMMERCIAL

## 2024-09-12 ENCOUNTER — BEH TREATMENT PLAN (OUTPATIENT)
Dept: BEHAVIORAL HEALTH | Facility: CLINIC | Age: 59
End: 2024-09-12
Attending: PSYCHIATRY & NEUROLOGY

## 2024-09-12 ENCOUNTER — TELEPHONE (OUTPATIENT)
Dept: BEHAVIORAL HEALTH | Facility: CLINIC | Age: 59
End: 2024-09-12

## 2024-09-12 ENCOUNTER — TELEPHONE (OUTPATIENT)
Dept: BEHAVIORAL HEALTH | Facility: CLINIC | Age: 59
End: 2024-09-12
Payer: COMMERCIAL

## 2024-09-12 DIAGNOSIS — F33.1 DEPRESSION, MAJOR, RECURRENT, MODERATE (H): Primary | ICD-10-CM

## 2024-09-12 DIAGNOSIS — F33.1 MDD (MAJOR DEPRESSIVE DISORDER), RECURRENT EPISODE, MODERATE (H): ICD-10-CM

## 2024-09-12 PROCEDURE — 90832 PSYTX W PT 30 MINUTES: CPT

## 2024-09-12 NOTE — TELEPHONE ENCOUNTER
**Patient Navigator Follow Up**    9/12/2024;    Referral for IOP-ADT;  55 plus Afternoons      Per , Iqra Meier, spot available for him in 55 plus afternoons.      I called and spoke to patient.    He wants to start the 55 plus afternoon group on Monday, 9/16.    I added him to the census and sent in basket message to the program as well.    I sent him Welcome Letter through "[employer], Mobile Secretary".        Thank you,    Zhanna LIANG  Patient Navigator

## 2024-09-12 NOTE — TELEPHONE ENCOUNTER
"    [facility] Transition Clinic    Provider Name:  Edward Salazar     Credentials:  [facility]    PATIENT'S NAME: Craig M Wilfahrt  PREFERRED NAME: Lam  PRONOUNS: he/him/his     MRN:   [id number]  :   1965   ACCT. NUMBER: [id number]  DATE OF SERVICE: 24  START TIME: 11:08 AM  END TIME: 11:16 AM  CPT UTILIZED: 22531 - Psychotherapy (with patient) - 30 (16-37*) min  PREFERRED PHONE: [phone]  May we leave a program related message: Yes  SERVICE MODALITY:  Phone Visit:      Provider verified identity through the following two step process.  Patient provided:  Patient  and Patient address    Telephone Visit: The patient's condition can be safely assessed and treated via synchronous audio telemedicine encounter.      Reason for Audio Telemedicine Visit: Patient convenience (e.g. access to timely appointments / distance to available provider)    Originating Site (Patient Location): Patient's home    Distant Site (Provider Location): Provider Remote Setting- Home Office    Consent:  The patient/guardian has verbally consented to:     1. The potential risks and benefits of telemedicine (telephone visit) versus in person care;    The patient has been notified of the following:      \"We have found that certain health care needs can be provided without the need for a face to face visit.  This service lets us provide the care you need with a phone conversation.       I will have full access to your [facility] medical record during this entire phone call.   I will be taking notes for your medical record.      Since this is like an office visit, we will bill your insurance company for this service.       There are potential benefits and risks of telephone visits (e.g. limits to patient confidentiality) that differ from in-person visits.?Confidentiality still applies for telephone services, and nobody will record the visit.  It is important to be in a quiet, private space that is free of " "distractions (including cell phone or other devices) during the visit.??      If during the course of the call I believe a telephone visit is not appropriate, you will not be charged for this service\"     Consent has been obtained for this service by care team member: Yes     DATA  Interactive Complexity: No  Crisis: No  Provider reviewed initial DA dated:  7/11/24    Presenting problem: \"Continued depression and anxiety\" patient goes on to explain that this has impacted his ability to engage in work   Patient would like the following family members involved in services   by including them in phone calls, open to both his wife and daughter involved.    Since the initial DA, Lam:  denies changes in his medical history.    denies changes in his living situation.    reports changes in his employment. Not working due to ongoing mental health stresors   reports changes regarding financial concerns due to not working  denies  changes in education status.   reports ability to meet his basic needs.   Since the initial DA, the aLm reports changes with his relationships/support system. Lam has started to meet with a therapist and psychiatrist with [facility]    Therapeutic intervention and progress:  Therapeutic intervention consisted of active listening, validation, thought reframing, and normalizing. Patient is making progress towards treatment goals as evidenced by engagement in this session/.     Significant Losses / Trauma / Abuse / Neglect Issues:   Since the initial DA, Lam denies new losses/trauma/abuse/neglect issues.       ASSESSMENT: Current Emotional / Mental Status (status of significant symptoms):  Risk status (Self / Other harm or suicidal ideation)   Patient denies current fears or concerns for personal safety.   Patient denies current or recent suicidal ideation or behaviors.   Patient denies current or recent homicidal ideation or behaviors.   Patient denies current or recent " self injurious behavior or ideation.   Patient denies other safety concerns.   Patient reports there has been no change in risk factors since their last session.     Patient reports there has been no change in protective factors since their last session.     Recommended that patient call 911 or go to the local ED should there be a change in any of these risk factors.     Appearance:   N/A Phone contact   Eye Contact:   N/A Phone contact   Psychomotor Behavior: N/A Phone contact   Attitude:   Cooperative    Orientation:   All   Speech    Rate / Production: Normal     Volume:  Normal    Mood:    Normal   Affect:    Flat    Thought Content:  Clear    Thought Form:  Coherent  Goal Directed  Logical    Insight:    Fair      Medication Review:   Changes to psychiatric medications, see updated Medication List in EPIC.   Pristiq 50 will be increased to 100mg newxt week  Effexor is being reguced to 37.5mg for a week then stopped     Medication Compliance:   Yes     Changes in Health Issues:   None reported   Patient Allergies:  No Known Allergies   Medical History:    Past Medical History:   Diagnosis Date    Hypertension        Substance Use History:   Patient does not report use of substances since the initial DA.   Since last DA:  Does use alcohol on occasion but denies other use    Based on the negative CAGE score and clinical interview there  are not indications of drug or alcohol abuse.      Functional Status:  Patient reports the following functional impairments: childcare / parenting, home life with  , management of the household and or completion of tasks, relationship(s), self-care, social interactions, and work / vocational responsibilities.     Programmatic care:  Current LOCUS was assigned and patient needs the following level of care based on score 18  .    Assessments completed prior to visit:  The following assessments were completed by patient for this visit:  PHQ9:       7/6/2020    11:13 AM 7/16/2020     10:00 AM 9/7/2021     8:05 AM 9/7/2021     3:44 PM 7/11/2024     6:43 AM   PHQ-9 SCORE   PHQ-9 Total Score MyChart   21 (Severe depression) 16 (Moderately severe depression) 23 (Severe depression)   PHQ-9 Total Score 13 10 21 16 23     GAD7:       7/6/2020    11:13 AM 7/16/2020    10:00 AM 9/7/2021     8:05 AM 9/7/2021     3:48 PM 7/11/2024     6:55 AM   JOSEPH-7 SCORE   Total Score   16 (severe anxiety) 14 (moderate anxiety) 20 (severe anxiety)   Total Score 7 4 16 14 20     CAGE-AID:       7/17/2020     8:00 AM 9/7/2021     8:13 AM 7/11/2024     6:57 AM   CAGE-AID Total Score   Total Score 2 0 0   Total Score MyChart  0 (A total score of 2 or greater is considered clinically significant) 0 (A total score of 2 or greater is considered clinically significant)     PROMIS 10-Global Health (all questions and answers displayed):       7/11/2024     6:56 AM   PROMIS 10   In general, would you say your health is: Excellent   In general, would you say your quality of life is: Very good   In general, how would you rate your physical health? Very good   In general, how would you rate your mental health, including your mood and your ability to think? Very good   In general, how would you rate your satisfaction with your social activities and relationships? Good   In general, please rate how well you carry out your usual social activities and roles Fair   To what extent are you able to carry out your everyday physical activities such as walking, climbing stairs, carrying groceries, or moving a chair? Mostly   In the past 7 days, how often have you been bothered by emotional problems such as feeling anxious, depressed, or irritable? Often   In the past 7 days, how would you rate your fatigue on average? Moderate   In the past 7 days, how would you rate your pain on average, where 0 means no pain, and 10 means worst imaginable pain? 0   In general, would you say your health is: 5   In general, would you say your quality of life is:  4   In general, how would you rate your physical health? 4   In general, how would you rate your mental health, including your mood and your ability to think? 4   In general, how would you rate your satisfaction with your social activities and relationships? 3   In general, please rate how well you carry out your usual social activities and roles. (This includes activities at home, at work and in your community, and responsibilities as a parent, child, spouse, employee, friend, etc.) 2   To what extent are you able to carry out your everyday physical activities such as walking, climbing stairs, carrying groceries, or moving a chair? 4   In the past 7 days, how often have you been bothered by emotional problems such as feeling anxious, depressed, or irritable? 4   In the past 7 days, how would you rate your fatigue on average? 3   In the past 7 days, how would you rate your pain on average, where 0 means no pain, and 10 means worst imaginable pain? 0   Global Mental Health Score 13   Global Physical Health Score 16   PROMIS TOTAL - SUBSCORES 29     [place] Suicide Severity Rating Scale (Lifetime/Recent)      7/4/2020    12:17 PM 7/6/2020    12:00 PM 7/17/2020     8:00 AM 9/7/2021     8:30 AM 7/11/2024     7:37 AM   [place] Suicide Severity Rating (Lifetime/Recent)   Q1 Wish to be Dead (Lifetime)  No No No    Q2 Non-Specific Active Suicidal Thoughts (Lifetime)  No No No    Most Severe Ideation Rating (Lifetime)  NA  NA    Frequency (Lifetime)  NA      Duration (Lifetime)  NA      Controllability (Lifetime)  NA      Protective Factors  (Lifetime)  NA      Reasons for Ideation (Lifetime)  NA      Q1 Wished to be Dead (Past Month) no       Q2 Suicidal Thoughts (Past Month) no       RETIRED: 1. Wish to be Dead (Recent)  No No No    RETIRED: 2. Non-Specific Active Suicidal Thoughts (Recent)  No No No    3. Active Suicidal Ideation with any Methods (Not Plan) Without Intent to Act (Lifetime)  No  No    RETIRED: 3. Active  Suicidal Ideation with any Methods (Not Plan) Without Intent to Act (Recent)  No  No    RETIRE: 4. Active Suicidal Ideation with Some Intent to Act, Without Specific Plan (Lifetime)  No  No    4. Active Suicidal Ideation with Some Intent to Act, Without Specific Plan (Recent)  No  No    RETIRE: 5. Active Suicidal Ideation with Specific Plan and Intent (Lifetime)  No  No    RETIRED: 5. Active Suicidal Ideation with Specific Plan and Intent (Recent)  No  No    Most Severe Ideation Rating (Past Month)  NA  NA    Frequency (Past Month)  NA      Duration (Past Month)  NA      Controllability (Past Month)  NA      Protective Factors (Past Month)  NA      Reasons for Ideation (Past Month)  NA      Actual Attempt (Lifetime)  No No No    Actual Attempt (Past 3 Months)  No No No    Has subject engaged in non-suicidal self-injurious behavior? (Lifetime)  No No No    Has subject engaged in non-suicidal self-injurious behavior? (Past 3 Months)  No No No    Interrupted Attempts (Lifetime)  No  No    Interrupted Attempts (Past 3 Months)  No  No    Aborted or Self-Interrupted Attempt (Lifetime)  No  No    Aborted or Self-Interrupted Attempt (Past 3 Months)  No  No    Preparatory Acts or Behavior (Lifetime)  No  No    Preparatory Acts or Behavior (Past 3 Months)  No  No    Q1 Wish to be Dead (Lifetime)     N   Q2 Non-Specific Active Suicidal Thoughts (Lifetime)     N   Actual Attempt (Lifetime)     N   Has subject engaged in non-suicidal self-injurious behavior? (Lifetime)     N   Interrupted Attempts (Lifetime)     N   Aborted or Self-Interrupted Attempt (Lifetime)     N   Preparatory Acts or Behavior (Lifetime)     N   Calculated C-SSRS Risk Score (Lifetime/Recent)     No Risk Indicated       LOCUS Worksheet     Name: Craig M Wilfahrt                                         MRN: [id number]    : 1965    Gender:  male      PMI:        Provider Name:  Edward Salazar M.S. [facility]          Provider NPI:  [id number]    Actual  level of Care Provided:  Assessment and referral    Service(s) receiving or referred to:  IOP/DTP    Reason for Variance: Patient's mental health is negatively impacting their ability to function well.  Patient needs a higher level of care to stabilize mental health symptoms.  Patient will benefit from more support in the community.       Rating completed by: Edward Salazar M.S. [facility]      I. Risk of Harm:   2      Low Risk of Harm    II. Functional Status:   4      Serious Impairment    III. Co-Morbidity:   2      Minor Co-Morbidity    IV - A. Recovery Environment - Level of Stress:   3      Moderately Stress Environment    IV - B. Recovery Environment - Level of Support:   2      Supportive Environment    V. Treatment and Recovery History:   3      Moderate to Equivocal Response to Treatment and Recovery Management    VI. Engagement and Recovery Project:   2      Positive Engagement and Recovery       18 Composite Score    Level of Care Recommendation:   17 to 19       High Intensity Community Based Services      Diagnosis:   Per DA by Kat Tilley 7/11/24 LICSW  296.32 (F33.1) Major Depressive Disorder, Recurrent Episode, Moderate With anxious distress.     Diagnostic Criteria:   Major Depressive Disorder  CRITERIA (A-C) REPRESENT A MAJOR DEPRESSIVE EPISODE - SELECT THESE CRITERIA  A) Recurrent episode(s) - symptoms have been present during the same 2-week period and represent a change from previous functioning 5 or more symptoms (required for diagnosis)   - Depressed mood. Note: In children and adolescents, can be irritable mood.     - Diminished interest or pleasure in all, or almost all, activities.    - Increased sleep.    - Feelings of worthlessness or inappropriate and excessive guilt.    - Diminished ability to think or concentrate, or indecisiveness.   B) The symptoms cause clinically significant distress or impairment in social, occupational, or other important areas of functioning  C) The  episode is not attributable to the physiological effects of a substance or to another medical condition  D) The occurence of major depressive episode is not better explained by other thought / psychotic disorders  E) There has never been a manic episode or hypomanic episod    Patient reports the following protective factors: positive relationships positive family connections, dedication to family/friends, safe and stable environment, help seeking behaviors when distressed  , living with other people, and committment to well-being    Session Summary: Writer met with patient and his daughter Roxy. They report frustration with limited follow through form [place], and did seek therapy and psychiatry outside of [place]. Lam reports that while this is somewhat helpful he continues to experience heightened levels of depression which impact his ability to function. His daughter also reports that he is often not himself and appears tired throughout the day. Patient reports that he continues to  feel hopeless at times, and continues to struggle with managing his depression despite his increase in outpatient resources. Due to this a higher level of care is necessary for stabilization of symptoms      PLAN: (Patient Tasks / Therapist Tasks / Other)  1. Recommendation is for patient to enter into the following treatment: IOP/DTP     2. Plan for Safety and Risk Management:   Recommended that patient call 911 or go to the local ED should there be a change in any of these risk factors..              3. Report to child / adult protection services was NA.     4. Patient's identified no marlon / Anabaptist / spiritual influences that need to be incorporated into care.     5. Initial Treatment will focus on:   (Last Treatment plan completed on:  )    Goal 1: Patient will Follow through with any recommendations made.    Objective #A     Patient will follow up with programmatic care.    Objective #B    Patient will  have transportation  to programming    Intervention(s)  [facility] will Make a referral to programmatic care,  Status: New as of September 12, 2024        5. Resources/Service Plan:    services are not indicated.   Modifications to assist communication are not indicated.   Additional disability accommodations are not indicated.      6. Collaboration:   Collaboration / coordination of treatment will be initiated with the following  support professionals: N/A.      7.  Referrals:   The following referral(s) will be initiated (list in order of priority or patient preference): IOP/DTP 55+ Senior Outpatient Program. Next Scheduled Appointment: to be scheduled by navigators.     A Release of Information has been obtained for the following: N/A.    8.  HEAVEN:  Discussed Discussed the general effects of drugs and alcohol on health and well-being. Provider did not give patient printed information about the effects of chemical use on their health and well being. Recommendations:  none .     9. Records:   They were reviewed at time of assessment.   Information in this assessment was obtained from the medical record and  provided by patient and family who is a good historian.    Patient will have open access to their mental health medical record.    Edward Salazar  Date:September 12, 2024

## 2024-09-12 NOTE — TELEPHONE ENCOUNTER
Summary of Patient Care Communication Handoff to Patient Navigator Coordinator    PATIENT'S NAME: Craig M Wilfahrt  MRN:   [id number]  :   1965    DATE OF SERVICE: 24    Referral Needed: Yes    Is the patient coming from an inpatient unit? No    What program is this referral for? Adult Mental Health Referral    Level of Care Recommended:  Combined Intensive Outpatient Day Treatment Program (IOP-ADT) / Adult Day Treatment Program (ADT)    Specialty Track Recommendations:  MH Specialty Tracks: 55 Plus    Schedule Preferences: Schedule Preference: Afternoons Preferred    Are there any potential barriers for entrance into programmatic care? suicidal ideation    Followed up from  Needed?:  No    Mental Health Referral Needed: No    Release of Information Needed:  N/A    Faxing Needed: No    Follow up Requests:  Patient Navigator Coordinator Follow-Up Needed: Referral to designated [place] Program.    Comments: Please bump up on the waitlist,    Edward Salazar        Patient Navigator Coordinator Contact Information  Pool Message: dept-[email or web address]-[email or web address] ([phone])   Phone:  [phone]  Fax:  [phone]  Email:  [email or web address]

## 2024-09-12 NOTE — TELEPHONE ENCOUNTER
----- Message from Zhanna VINCENT sent at 9/12/2024 12:18 PM CDT -----  Regarding: Referral for IOP/DT-3; 55 plus afternoons  Adult Mental Health Programmatic Care Schedule Request    Patient Name: Craig M Wilfahrt  Location of programming: [facility]  Start Date: 9/16/2024    Group/PROVIDER: ADMISSION IOP AM TRACK [[id number]]   Appt Time: 12pm   Duration of Appointment in minutes: 60 minutes   Visit Type: Treatment [870]   Attending Provider: Patrick Fine     Adult Program Group: Adult Program Group: IOP/DT 3  55+ Track [[id number]]  Schedule: M, W, Th, F 1pm-4pm  12 hours per week for 9 weeks  Number of visits to be scheduled: 36 days    Attending Provider (MD):  Dr. Patrick Fine ([place])  Visit Type:  In-Person    Accommodations Needed: No  Alerts Identified/Substantiation: No  Consulted with Supervisor: No

## 2024-09-13 ENCOUNTER — TELEPHONE (OUTPATIENT)
Dept: BEHAVIORAL HEALTH | Facility: CLINIC | Age: 59
End: 2024-09-13
Payer: COMMERCIAL

## 2024-09-13 NOTE — PROGRESS NOTES
"RN Review of Medical History / Physical Health Screen  Outpatient Mental Health Programs - [facility] Adult Mental Health Outpatient Programs    PATIENT'S NAME: Craig M Wilfahrt  Preferred name: Lam   58 year old  MRN:   [id number]  :   1965  ACCT. NUMBER: [id number]  CURRENT AGE:  58 year old    DATE OF DIAGNOSTIC ASSESSMENT:  24  DATE OF ADMISSION: 2024        Please see Diagnostic Assessment for additional Medical History.     General Health:   Have you had any exposure to any communicable disease in the past 2-3 weeks? no     Do you have a history of seizures?     If so, do you have a seizure plan? Known triggers?     Notify patient that we will call 911 (if virtual) or a code (if in-person), if we were to witness seizure during group. no  N/A     Nutrition:    Are you on a special diet? If yes, please explain:  no   Do you have any concerns regarding your nutritional status? If yes, please explain:  no   Have you had any appetite changes in the last 3 months?  No     Have you had any weight loss or weight gain in the last 3 months?  No           Height/Weight Review:  Patient reported height:  6' 05\"      Patient reports weight:  Date last checked:  220 lbs   Pt reported        Patient height and weight recorded by RN in epic flowsheet: No; Unable to measure  Programmatic Care currently provided via telehealth. All pt weights and heights will be collected through patient self-report and recorded in physical health screening progress note upon admission to the program.      BMI Review:  Was the patient informed of BMI? No.     Findings No Intervention         Fall Risk:   Have you had any falls in the past 3 months? no     Do you currently use any assistive devices for mobility?   no      Does the patient have medication concerns? no     Was an MTM referral placed? no      Does the patient have any acute or chronic pain concerns that might impact participation in the program? " no       Additional Comments/Assessment: none.    Per completion of the Medical History / Physical Health Screen, is there a recommendation to see / follow up with a primary care physician/clinic or dentist?    Yes, Recommendations:   physical exam yearly.           Annabella Manjarrez RN  9/13/2024

## 2024-09-13 NOTE — PROGRESS NOTES
Crisis Resources     Refer to the resources below as needed.     Steps to care for yourself     If you are currently in counseling, call your counselor for an appointment  Call the local crisis resources below if needed.  Contact friends or family for support.  Get more exercise.  Do activities you enjoy.  Eat a well-balanced diet and drink plenty of fluids.  Rest as needed.  Limit alcohol and recreational drugs. These can worsen depression.  Properly store or remove fire arms.      When to contact your primary care provider      You have thoughts of harming or killing yourself but have not made a plan to carry it out.  Your depression gets in the way of daily activities.  You are often unable to sleep.  You need help cutting back on alcohol or recreational drugs.     When to call 911 or go to the Emergency Room      Get emergency help right away if you have any of the following:  You are planning to harm or kill yourself and you have a way to carry out the plan.   You have injured yourself or others. Or, you think you will.  You feel confused or are having trouble thinking or remembering.  You are having delusions (false beliefs).  You are hearing voices or seeing things that aren t there.  You are feeling psychotic (paranoid, fearful, restless, agitated, nervous, racing thoughts or speech)     Crisis Resources   The EmPath is an adults only unit located at [facility] is a short term (generally less than 23 hour stay) designed for crisis intervention and stabilization. Pts have the opportunity to meet quickly with a behavioral health team for evaluation in a calm and peaceful therapuetic environment. To be evaluated for admission pts are triaged throught the [facility] ED.      The Behavioral Evaluation Center (BEC) is located at the [facility].The BEC is open to ages and provides a comprehensive behavioral health evaluation to those in crisis. Patients  typically stay 24-36 hours.      The following hotlines are for both adults and children. The and are open 24 hours a day, 7 days a week unless noted otherwise.        Crisis Lines        Gambling Hotline  9.276.603.0583 [hope]        línea de crisis española  462.006.2592        [facility] [employer] Helpline  575.169.3686        National Hope Line  2.257.138.3620 [hope]        National Suicide Prevention Lifeline: text 988        The [employer]enny Project (LGBTQ Youth Crisis Line)  7.962.634.8540  text START to 837-804        's Crisis Line  8.861.335.8219 (Press 1)  or text 051373        [facility] Crisis  867.965.8522      [facility] Mobile Crisis  [phone]      [facility] Crisis  [phone]      [facility] Mobile Crisis  [phone] (adults)  [phone] (children)      [facility] Mobile Crisis  [phone] (adults)  [phone] (children)      [facility] Mobile Crisis  [phone]      [facility] Mobile Crisis  [phone]    Community Resources      Fast Tracker  Linking people to mental health and substance use disorder resources  fasttrackermn.org        National Benedict on Mental Illness (DARON)  414.668.7475 or 1.888.DARON.HELPS  https://namimn.org/        [facility] Urgent Care for Adult Mental Health  [facility] residents [address]  [phone]        [facility]  Free mental health counseling  https://walkin.org/  612.870.0565 X2    Mental Health Apps      Calm Harm  https://calmharm.co.uk/      My3  https://my3app.org/      [place] Safety Plan  https://www.mysafetyplan.org/

## 2024-09-13 NOTE — PROGRESS NOTES
"    Admission SBAR NOTE  Adult  Outpatient Programs          SITUATION:     Admission Date: 2024    Provider verified identity through the following two step process.  Patient provided: verbal spelling of full first and last name and Patient     Patient name:  Craig M Wilfahrt  Preferred name: Lam He/Him/His/Himself 58 year old  Diagnosis/-es (copy from , including ICD-10):   296.32 (F33.1) Major Depressive Disorder, Recurrent Episode, Moderate With anxious distress.    Assigned Program/Track: IOPDT3    Reviewed patient's schedule and informed them of any variation due to holidays. yes    Does the patient have any planned absences and/or barriers to admission/treatment? Yes, Thursday will leave early for appointment with PCP   NOTE: impact of transportation, technology, childcare, work, or housing concerns.    Insurance: Payor: MEDICA / Plan: MEDICA CHOICE / Product Type: Indemnity /  Changes/Concerns: no    Does patient need an appointment with the program provider? yes  NOTE: If yes, please confirm/schedule provider visit.      BACKGROUND:     Patient's stated goal/reason for treatment (copy from ; confirm with patient): \"Continued depression and anxiety\" patient goes on to explain that this has impacted his ability to engage in work \"    \"To stop having anxiety and depression issues that I have.\"      ASSESSMENT:     Please consult  if any of the following concerns may impact admission/participation in program:     PHQ, JOSEPH and PROMIS done within 7 days OR send upon admission if over 7 days.      [place] Suicide Severity Rating (select Lifetime/Recent): [place] Suicide Severity Rating Scale (Lifetime/Recent)      2020    12:17 PM 2020    12:00 PM 2020     8:00 AM 2021     8:30 AM 2024     7:37 AM   [place] Suicide Severity Rating (Lifetime/Recent)   Q1 Wish to be Dead (Lifetime)  No No No    Q2 Non-Specific Active Suicidal Thoughts (Lifetime)  No No No  "   Most Severe Ideation Rating (Lifetime)  NA  NA    Frequency (Lifetime)  NA      Duration (Lifetime)  NA      Controllability (Lifetime)  NA      Protective Factors  (Lifetime)  NA      Reasons for Ideation (Lifetime)  NA      Q1 Wished to be Dead (Past Month) no       Q2 Suicidal Thoughts (Past Month) no       RETIRED: 1. Wish to be Dead (Recent)  No No No    RETIRED: 2. Non-Specific Active Suicidal Thoughts (Recent)  No No No    3. Active Suicidal Ideation with any Methods (Not Plan) Without Intent to Act (Lifetime)  No  No    RETIRED: 3. Active Suicidal Ideation with any Methods (Not Plan) Without Intent to Act (Recent)  No  No    RETIRE: 4. Active Suicidal Ideation with Some Intent to Act, Without Specific Plan (Lifetime)  No  No    4. Active Suicidal Ideation with Some Intent to Act, Without Specific Plan (Recent)  No  No    RETIRE: 5. Active Suicidal Ideation with Specific Plan and Intent (Lifetime)  No  No    RETIRED: 5. Active Suicidal Ideation with Specific Plan and Intent (Recent)  No  No    Most Severe Ideation Rating (Past Month)  NA  NA    Frequency (Past Month)  NA      Duration (Past Month)  NA      Controllability (Past Month)  NA      Protective Factors (Past Month)  NA      Reasons for Ideation (Past Month)  NA      Actual Attempt (Lifetime)  No No No    Actual Attempt (Past 3 Months)  No No No    Has subject engaged in non-suicidal self-injurious behavior? (Lifetime)  No No No    Has subject engaged in non-suicidal self-injurious behavior? (Past 3 Months)  No No No    Interrupted Attempts (Lifetime)  No  No    Interrupted Attempts (Past 3 Months)  No  No    Aborted or Self-Interrupted Attempt (Lifetime)  No  No    Aborted or Self-Interrupted Attempt (Past 3 Months)  No  No    Preparatory Acts or Behavior (Lifetime)  No  No    Preparatory Acts or Behavior (Past 3 Months)  No  No    Q1 Wish to be Dead (Lifetime)     N   Q2 Non-Specific Active Suicidal Thoughts (Lifetime)     N   Actual Attempt  (Lifetime)     N   Has subject engaged in non-suicidal self-injurious behavior? (Lifetime)     N   Interrupted Attempts (Lifetime)     N   Aborted or Self-Interrupted Attempt (Lifetime)     N   Preparatory Acts or Behavior (Lifetime)     N   Calculated C-SSRS Risk Score (Lifetime/Recent)     No Risk Indicated       LOCUS completed for recommended level of care? yes  IOP: 17-19; PHP: 20-22     Copy/Paste current Safety Plan to the BEH TX PLAN ENCOUNTER. no    Safety status/concerns: pt denies.     Substance use concerns:  Alcohol: 5-6 beers a week  Pt denies use of all other substances.     Based on the negative CAGE score and clinical interview there  are not indications of drug or alcohol abuse    Pertinent Medical/Nutritional concerns: no    Confirm Emergency Contact listed in the SnapShot/Demographics with patient and notify OBC if an update is required. yes    Paper or Docusign requirements for ROIs, e-SHANEL, emergency contact, etc have been completed? yes  If not, do upon admission.     Does patient have FMLA or Short-Term Disability requests/plans? no  NOTE: Whenever possible, FMLA or Short-Term Disability paperwork needs to be managed/completed by the patient's community provider.   Exceptions: Patient does not have a community provider AND request is specific to mental health and time off for the duration of the program participation.    Notify RN Triage as soon as possible.     Care Providers/Medication Management Needs:     Does patient have a current community or other [facility] provider prescribing medications for mental health? yes  NOTE: Delete below if not applicable:    Psychiatric Provider (or PCP if managing MH meds)/Name: Olayinka Lozano  [facility] name/location: [facility]  Next appointment:  About 3 weeks      NOTE: Inform patients, program is temporary and we will not be transferring care. Patient's should continue to see their community provider.       Individual Therapist/Name:   Cristin Paz   Clinic name/location: [facility] in [place]     Patient will continue to see above provider while participating in program: yes        RECOMMENDATIONS:     Patient Admission Completed: yes    Care Team, referrals made/needed: no  PCP: Keshav Trevizo  NOTE: Notify RN, as needed, to make internal referrals.                                                             Completed by: Annabella Manjarrez RN

## 2024-09-13 NOTE — TELEPHONE ENCOUNTER
Writer called pt in attempt to complete admission interview for IOPDT3 start on Monday. Pt did not answer call. LVM requesting call back or early arrival on Monday for completion of admission.

## 2024-09-16 ENCOUNTER — HOSPITAL ENCOUNTER (OUTPATIENT)
Dept: BEHAVIORAL HEALTH | Facility: CLINIC | Age: 59
Discharge: HOME | End: 2024-09-16
Attending: PSYCHIATRY & NEUROLOGY
Payer: COMMERCIAL

## 2024-09-16 VITALS
HEIGHT: 73 IN | DIASTOLIC BLOOD PRESSURE: 94 MMHG | TEMPERATURE: 97.5 F | SYSTOLIC BLOOD PRESSURE: 124 MMHG | OXYGEN SATURATION: 99 % | HEART RATE: 83 BPM | WEIGHT: 220 LBS | BODY MASS INDEX: 29.16 KG/M2 | RESPIRATION RATE: 16 BRPM

## 2024-09-16 DIAGNOSIS — F33.1 MDD (MAJOR DEPRESSIVE DISORDER), RECURRENT EPISODE, MODERATE (H): Primary | ICD-10-CM

## 2024-09-16 PROCEDURE — 90853 GROUP PSYCHOTHERAPY: CPT

## 2024-09-16 PROCEDURE — 90853 GROUP PSYCHOTHERAPY: CPT | Performed by: SOCIAL WORKER

## 2024-09-16 PROCEDURE — 99205 OFFICE O/P NEW HI 60 MIN: CPT

## 2024-09-16 PROCEDURE — 90853 GROUP PSYCHOTHERAPY: CPT | Performed by: COUNSELOR

## 2024-09-16 RX ORDER — BUSPIRONE HYDROCHLORIDE 15 MG/1
15 TABLET ORAL DAILY
COMMUNITY

## 2024-09-16 ASSESSMENT — PATIENT HEALTH QUESTIONNAIRE - PHQ9
10. IF YOU CHECKED OFF ANY PROBLEMS, HOW DIFFICULT HAVE THESE PROBLEMS MADE IT FOR YOU TO DO YOUR WORK, TAKE CARE OF THINGS AT HOME, OR GET ALONG WITH OTHER PEOPLE: EXTREMELY DIFFICULT
SUM OF ALL RESPONSES TO PHQ QUESTIONS 1-9: 13
10. IF YOU CHECKED OFF ANY PROBLEMS, HOW DIFFICULT HAVE THESE PROBLEMS MADE IT FOR YOU TO DO YOUR WORK, TAKE CARE OF THINGS AT HOME, OR GET ALONG WITH OTHER PEOPLE: EXTREMELY DIFFICULT
SUM OF ALL RESPONSES TO PHQ QUESTIONS 1-9: 13

## 2024-09-16 ASSESSMENT — ANXIETY QUESTIONNAIRES
GAD7 TOTAL SCORE: 13
7. FEELING AFRAID AS IF SOMETHING AWFUL MIGHT HAPPEN: SEVERAL DAYS
GAD7 TOTAL SCORE: 13
8. IF YOU CHECKED OFF ANY PROBLEMS, HOW DIFFICULT HAVE THESE MADE IT FOR YOU TO DO YOUR WORK, TAKE CARE OF THINGS AT HOME, OR GET ALONG WITH OTHER PEOPLE?: VERY DIFFICULT
8. IF YOU CHECKED OFF ANY PROBLEMS, HOW DIFFICULT HAVE THESE MADE IT FOR YOU TO DO YOUR WORK, TAKE CARE OF THINGS AT HOME, OR GET ALONG WITH OTHER PEOPLE?: VERY DIFFICULT
GAD7 TOTAL SCORE: 13
8. IF YOU CHECKED OFF ANY PROBLEMS, HOW DIFFICULT HAVE THESE MADE IT FOR YOU TO DO YOUR WORK, TAKE CARE OF THINGS AT HOME, OR GET ALONG WITH OTHER PEOPLE?: VERY DIFFICULT
GAD7 TOTAL SCORE: 13
7. FEELING AFRAID AS IF SOMETHING AWFUL MIGHT HAPPEN: SEVERAL DAYS
GAD7 TOTAL SCORE: 13
7. FEELING AFRAID AS IF SOMETHING AWFUL MIGHT HAPPEN: SEVERAL DAYS
GAD7 TOTAL SCORE: 13
7. FEELING AFRAID AS IF SOMETHING AWFUL MIGHT HAPPEN: SEVERAL DAYS
GAD7 TOTAL SCORE: 13
8. IF YOU CHECKED OFF ANY PROBLEMS, HOW DIFFICULT HAVE THESE MADE IT FOR YOU TO DO YOUR WORK, TAKE CARE OF THINGS AT HOME, OR GET ALONG WITH OTHER PEOPLE?: VERY DIFFICULT
GAD7 TOTAL SCORE: 13
8. IF YOU CHECKED OFF ANY PROBLEMS, HOW DIFFICULT HAVE THESE MADE IT FOR YOU TO DO YOUR WORK, TAKE CARE OF THINGS AT HOME, OR GET ALONG WITH OTHER PEOPLE?: VERY DIFFICULT
7. FEELING AFRAID AS IF SOMETHING AWFUL MIGHT HAPPEN: SEVERAL DAYS

## 2024-09-16 ASSESSMENT — COLUMBIA-SUICIDE SEVERITY RATING SCALE - C-SSRS
2. HAVE YOU ACTUALLY HAD ANY THOUGHTS OF KILLING YOURSELF?: NO
1. SINCE LAST CONTACT, HAVE YOU WISHED YOU WERE DEAD OR WISHED YOU COULD GO TO SLEEP AND NOT WAKE UP?: NO

## 2024-09-16 ASSESSMENT — PAIN SCALES - GENERAL: PAINLEVEL: NO PAIN (0)

## 2024-09-16 NOTE — PROGRESS NOTES
Intensive Outpatient Program   Physician Certification of Medical Necessity    Patient Name: Craig M Wilfahrt  Patient Preferred Name: Lam  Patient : 1965  Patient MRN: [id number]    Attending physician: Patrick Fine MD      Admission Certification:    I certify the above-named patient would require partial hospitalization care if intensive outpatient services were not provided and that the patient requires such IOP services for a minimum of 9 hours per week. These services are provided under the care and supervision of a physician and under an individualized plan of treatment that is established and periodically reviewed by a physician in consultation with appropriate staff participating in the program.    From admission date: 2024 to 60th day: 2024    Patrick Fine MD on 2024 at 12:39 PM

## 2024-09-16 NOTE — H&P
"[facility] Mental Health Outpatient Programs  Initial Psychiatric Evaluation    Patient: Craig M Wilfahrt  Preferred Name: Lam  MRN: [id number]  : 1965  Acct. No.: [id number]  Date of Service: 24  Program Track: IOP/DT 3 55+    Date of Diagnostic Assessment/Psychosocial Evaluation: 2024    Identifying Data:  Craig M Wilfahrt, a 58 year old-year-old with reported history of depression, presents for initial visit to provide oversight of programmatic care. Patient attended the session alone, uses he/him pronouns, and prefers to be called: \"Lam\"    Presenting Concern:  \"Anxiety and depression.\"   Per diagnostic assessment: \"depression and anxiety\"    History of Present Illness:  Chart reviewed, history as documented reviewed with Lam. Patient endorses:  Read DA nor  History of anxiety and depression over the past few years. Siice CIVID  No history of hospitalization related to Anxiety or depression  No history suicide attempts  Current episode has been on going on and off depression  Over the past 6 six weeks it has become worse  Even now no suicide thoughts  The job I had was stressful. Lot of travel. Lot of leave on Monday come back on Friday  No suicidal thoughts  Denies hallucinations, delusions and eating disorder  There appears  I have a psychiatrist. We have virtual appointment. Recent appointment was last week  Venlafaxine 75 mg daily  Been taking for about a year  Vraylar 3 mg daily  Been taking for about a year  Buspirone 15 mg once a day  Been taking for about half a year  Medication do not seem to work that great    Goals for Treatment (also please see individualized treatment plan):  Get Depression and anxiety settle down so I get back to normal and get back to work    Review of Symptoms  Review of systems as recorded in diagnostic assessment reviewed with patient.  Today notes:  Sleep: I sleep fine but I am taking nap in the afternoon to " alka down the thoughts going on in my head  Appetite: It is fine. It is not strong as it used to be  Occasional I cannot calm down racing thoughts in my head. Mostly when I am not doing anything. Sometimes they are stressful but not always  Mood is once in the wile positive  Level of energy in down, not motivated as would like to be  I do not have strong interest in doing hobbies as I used to be  Suicidal ideation: denies current or recent suicidal ideation or behavior  Thoughts of non-suicidal self-injury: denied  Recent self-injurious behavior: denied  Homicidal ideation: denied  Other safety concerns: denied    Activities of Daily Living and Related Systems Impacted by Illness:  Hygiene: No concerns  Socialization: occasional isolation on days that I am really depressed  Activities of Daily Living: (cleaning, shopping, bills, etc.): As above  Concerns related to work: Right now not working. No returning to the job    Substance use:  Beer 6 beers a week, multiple seating      Current Medications:  Current Outpatient Medications   Medication Sig Dispense Refill    atorvastatin (LIPITOR) 20 MG tablet Take 20 mg by mouth daily      busPIRone (BUSPAR) 15 MG tablet Take 15 mg by mouth daily.      cariprazine (VRAYLAR) 3 MG capsule Take 3 mg by mouth daily.      lisinopril (ZESTRIL) 10 MG tablet Take 20 mg by mouth daily.      venlafaxine (EFFEXOR-XR) 75 MG 24 hr capsule          The above list was reviewed and updated in [facility] with patient today.     Patient is taking medications as prescribed and denies adverse effects    Medication History/Past Medication Trials:    Remainder of history, including psychiatric, substance, family, social as documented in diagnostic assessment/psychosocial evaluation and/or above    Medical Review of Systems:  Pertinent: None    Laboratory Results:  Most recent labs reviewed. Pertinent updates/findings: None.       Mental Status Examination:  Vital Signs: There were no vitals taken for  "this visit.   Appearance: adequately groomed, appears stated age, and in no apparent distress.  Attitude: cooperative   Eye Contact: good   Muscle Strength and Tone: normal  Psychomotor Behavior: normal or unremarkable   Gait and Station: normal width, turn, arm swing  Speech: clear, coherent, decreased prosody, regular rate, regular rhythm, and fluent  Associations: No loosening of associations  Thought Process: coherent and goal directed  Thought Content: no evidence of suicidal ideation or homicidal ideation, no evidence of psychotic thought, no auditory hallucinations present, and no visual hallucinations present  Mood: \"angry and depressed\"  Affect: mood congruent, intensity is blunted, and intensity is flat  Insight: good  Judgment: intact, adequate for safety  Impulse Control: intact  Oriented to: time, place, person, and situation  Attention Span and Concentration: Normal  Language: Intact  Recent and Remote Memory: Delayed & immediate recall intact  Fund of Knowledge/Assessment of Intelligence: Average  Capacity of Activities of Daily Living: Independent, able to participate in programmatic care services.    DSM5 Diagnosis/es:    ICD-10-CM    1. MDD (major depressive disorder), recurrent episode, moderate (H)  F33.1           Assessment/Plan:  Lam presents today for initial psychiatric evaluation as part of oversight of programmatic care. History of depression and anxiety, tried group therapy before. He reported fluctuating levels of depression over the past year and is seeking care now due to a recent worsening of symptoms, primarily caused by stressful work conditions. He denies any suicidal ideation or other safety concerns. He reported compliance with Venlafaxine, and while an increase in dosage was discussed to better manage his depression, no changes were made at this time. He is also taking Vraylar and Buspirone. Given the ongoing depression and suicidal ideation, he is a safety risk. To address " this, he will continue engaging in psychotherapy and medication management through [facility]..  Follow-up in 3-4 weeks, or sooner as needed   Depression  Engage in psychotherapy  Continue with current medication regimen  Venlafaxine 75 mg daily  Plan to increase to target depression  Vraylar 3 mg daily  Improve sleep hygiene  Maintain a balanced diet  Engage in physical activities as tolerated    Anxiety  As noted above  Buspirone 15 mg daily    Safety Assessment:  Lam reports suicidal ideation and/or non-suicidal self-injury or thoughts thereof as noted above  Lam is future-oriented and is engaged in treatment planning   I do not feel that Lam meets criteria for a 72-hour involuntary hold and remains appropriate for an outpatient level of care      Signed:   LUISITO SHERIFF, ROCCO   September 16, 2024      Visit Details:  Type of service: In-person  Location (patient and provider): [facility] Adult Mental Health Outpatient Programmatic Care Offices    Level of Medical Decision Making:   - At least 1 chronic problem that is not stable  - Engaged in prescription drug management during visit (discussed any medication benefits, side effects, alternatives, etc.)  Discussion of management or test interpretation with external physician/other qualified healthcare professional/appropriate source - programmatic care multidisciplinary treatment team    Chart review as part of intake process includes diagnostic assessment/psychosocial evaluation and any recent updates, as well as recent ED and/or inpatient documentation, where applicable.The reader is referred to those sources for additional information.    60 min spent on the date of the encounter in chart review, patient visit, review of tests, documentation, care coordination, and/or discussion with other providers about the issues documented above.      This document completed in part using [employer] dictation software and therefore may contain inadvertent  word or phrase substitutions.

## 2024-09-16 NOTE — GROUP NOTE
Psychoeducation Group Note    PATIENT'S NAME: Craig M Wilfahrt  MRN:   [id number]  :   1965  ACCT. NUMBER: [id number]  DATE OF SERVICE: 24  START TIME:  3:00 PM  END TIME:  3:50 PM  FACILITATOR: Shannan Hays LICSW; Annabella Manjarrez RN  TOPIC: MH Wellness Group: Health Maintenance  [facility] Adult Mental Health Outpatient Programs  TRACK: IOP/DT 3    NUMBER OF PARTICIPANTS: 7    Summary of Group / Topics Discussed:  Health Maintenance: Discharge planning/Community resources: Patients worked on completing an instructor-facilitated discharge planning activity. Discharge planning begins for all patients after admission. Competent discharge planning promotes a successful transition and decreases the likelihood of mental health relapse. In this group, all dimensions of wellness were reviewed to assess for needs/discharge readiness. These dimensions included: physical, emotional, occupational/productivity, environmental, social, spiritual, intellectual, and financial. Patients worked on completing/updating their discharge planning and identifying their treatment needs prior to time of discharge.     Patient Session Goals / Objectives:  Identified unmet treatment needs to accomplish before discharge  Completed all dimensions of the discharge planning packet  Participated in the planning process, make phone calls, set up appointments, got connected with community resources, followed up with treatment team as needed         Patient Participation / Response:  Fully participated with the group by sharing personal reflections / insights and openly received / provided feedback with other participants.    Demonstrated understanding of topics discussed through group discussion and participation and Verbalized understanding of health maintenance topic    Treatment Plan:  Patient has a current master individualized treatment plan.  See Epic treatment plan for more information.    Shannan Hays,  LICSW

## 2024-09-16 NOTE — GROUP NOTE
Psychoeducation Group Note    PATIENT'S NAME: Craig M Wilfahrt  MRN:   [id number]  :   1965  ACCT. NUMBER: [id number]  DATE OF SERVICE: 24  START TIME:  2:00 PM  END TIME:  2:50 PM  FACILITATOR: Roselyn Singh LICSW  TOPIC: MH PHP OT Group: Lifestyle Balance and Structure  [facility] Mental Health Outpatient Programs  TRACK: IOP/DT 3 55+    NUMBER OF PARTICIPANTS: 7    Summary of Group / Topics Discussed:  Lifestyle Balance and Structure:  Goal-setting & integration: To support progress towards treatment goals and psychiatric recovery, group members were led through a weekly structured process to reflect on progress, integrate new learning/skills, and emerging self-awareness into their daily and weekly life. Provided psychoeducation on the neuroscience of change, self-compassion, and the anatomy of a SMART goal to the group. Facilitated the sharing of individual goals with validation, support, and feedback provided.    Patient Session Goals / Objectives:  Reflected on and create a vision for recovery and wellbeing  Identified and wrote 3 SMART goals for the week  Identified and problem solved barriers to achieving identified goals   Identified plan to support follow through on goals and reflection on progress made      Patient Participation / Response:  Fully participated with the group by sharing personal reflections / insights and openly received / provided feedback with other participants.    Verbalized understanding of content, Patient would benefit from additional opportunities to practice the content to be able to generalize it to their everyday life with increased intentionality, consistency, and efficacy in support of their psychiatric recovery, and Patient worked towards initial treatment plan goals     Treatment Plan:  Patient has a current master individualized treatment plan.  See Epic treatment plan for more information.    BETTIE Flores

## 2024-09-16 NOTE — GROUP NOTE
Psychotherapy Group Note    PATIENT'S NAME: Craig M Wilfahrt  MRN:   [id number]  :   1965  ACCT. NUMBER: [id number]  DATE OF SERVICE: 24  START TIME: 12:00 PM  END TIME: 12:50 PM  FACILITATOR: Bj Silva LMFT; Patrick Fine MD  TOPIC: MH EBP Group: Specialty [facility] Adult Mental Health Outpatient Programs  TRACK: AIOP    NUMBER OF PARTICIPANTS: 3    Summary of Group / Topics Discussed:  Specialty Topics: Goal Setting: Provided education and assessment on patient's group programming goals. Evaluated patient's assessment of their ability to participate in groups, share emotions with group members, and openness to the group format. Provided education regarding appropriate individual-based group therapy goals.     Patient Session Goals and Objectives:  -Participate in reflective assessment of group readiness.  -Understand appropriate topics and methods to discuss those topics in group programming.  -Identify and problem solve barriers to group participation.  -Identify strategies to actively cope while engaging in group programming.   -Reflected up individualized treatment plans  -Meet with members of the treatment team to assess goal progress       Patient Participation / Response:  Fully participated with the group by sharing personal reflections / insights and openly received / provided feedback with other participants.    Demonstrated understanding of topics discussed through group discussion and participation, Identified / Expressed readiness to act on skill suggestions discussed in topic, Verbalized understanding of ways to proactively manage illness, and Practiced skills in session    Treatment Plan:  Patient has a current master individualized treatment plan.  See Epic treatment plan for more information.    KENNA Clay

## 2024-09-16 NOTE — PROGRESS NOTES
"Individualized Treatment Plan       Patient's Name: Craig M Wilfahrt   Preferred Name: Lam  Pronouns:  He/Him   :   1965  MRN:   [id number]    Program Admission Date: 24     Estimated Program Discharge Date: 2024 (Please note, this date is subject to change based on needs and progress toward identified goals.)      Date of Initial Treatment Plan: 24    Chief Concern: I am in this program because: \"Due to anxiety and depression issues.  Due to loss of a job and death of my father and my oldest brother.\"       Long Term Goal: \"Get back to the normal way of life, life/work balance.\"      Goal Areas:  GOAL AREA 1: Personal Safety       Goal Status: Stopped   Description:   Suicidal ideation: na, Lam agreed to reach out for help if safety concerns ever come up  Self-injury urges: milly  Last acted on self-injury: na  Skills that help me cope with self-injury: na    While in program I would like to accomplish: \"stay safe\"  I will know I am making progress when: \"stay safe\"  I think I will be ready to discharge from the program when: \"stay safe\"  SMART goals/tasks:   Lam agrees to check in on safety status daily in group.  Progress updates:  10/14 Lam continues to deny any safety concerns, including SI and SIB.  He will continue to check in on safety status daily in group.   goal stopped due to discharge.  He denies safety concerns.    GOAL AREA 2: Symptom Management      Goal Status: Stopped   Description:   emotion instability, unhelpful or intrusive thoughts, lack of interest or pleasure in activities, and low motivation, and negative thoughts  He sometimes sleep for coping.  While in program I would like to accomplish: \"To think more positive\"  I will know I am making progress when: \"I'm more relaxed and level headed.\"  I think I will be ready to discharge from the- program when: \"When I'm back to my routine\" \"going to the gym in the morning and working out\"  SMART " "goals/tasks:  \"Get back into a normal exercise routine\" \"exercise 2 times a week.\"  10/14 Organize hunting gear for one hour in the evening this week  10/14 Listen to a meditation recording after a workout.  Progress updates:  10/14 Lam reports exercising a couple times this weekend via treadmill and dog walks.  He is starting to feel \"more relaxed\".  He wants to work up exercising to 3-4 days per week to manage anxiety.  He's had success with using progressive muscle relaxation for sleep.  He would like to prioritize his fall hobby of hunting.  11/18 goal stopped due to discharge.  He reports using wise mind to increase productivity and self-trust.    GOAL AREA 3: Daily Life Skills         Goal Status: Stopped   Description:   leisure participation (activities for enjoyment, hobbies) and life roles (parenting, working, going to school, care giving, volunteering, etc.)  \"Being a better parent\"  Hobbies include: Water skiiing, motor cycle, snowmobiles, ice fishing  While in program I would like to accomplish: \"Improve on hobbies and leisure activities; getting myself back working and maintaining a work life balance\"  I will know I am making progress when: \"Enjoy working and balance\"  I think I will be ready to discharge from the program when: complete below SMART goals  SMART goals/tasks:  Work on winterizing the motorcycles   Have lunch with friends every other week  Prepare items for hunting in November  Progress updates:  10/28 Lam reports reaching out to a friend who was agreeable to have lunch this or next Tuesday. He reports getting some organizational tasks done for hunting. He talked about completing a car repair task: \"I enjoyed it get, helped to get my mind off of other things\".  11/18 goal stopped due to discharge.  He is going to cabin this weekend with a friend to help close it for the winter.  He will get lunch with his friend every two weeks.  He enjoyed hunting last weekend and hopes to once more " "before the season ends.      GOAL AREA 4: Wellness  Goal Status: Stopped   Description:   sleep and physical activity/movement    While in program I would like to accomplish: \"Increase physical activity to get me tired; avoid sleeping more than 9 hours per night\"  I will know I am making progress when: \"Back to normal gym routine and work\"  I think I will be ready to discharge from the program when: complete below SMART goals  SMART goals/tasks:  9/16 sleep maximum of 9 hours per night.  Exercise at home or gym 2-3x week.  Progress updates:  9/30 He has found it helpful to discuss the pathophysiology of anxiety in nursing groups.  10/28 Lam would like to increase consistency with exercise. He's done some exercise on the treadmill and walking his dogs.  He'd also like to go back to the health club which is an additional support system of his.  He has active plans to connect with one of his supports at the health club.  11/18 goal stopped due to discharge.  He plans to return to the gym with a friend.    GOAL AREA 5: Aftercare Plan  Goal Status: Stopped   Description:   return to individual therapy (Mati Singh)  Outpatient prescriber:  Shannon [facility] through [place]  After program is completed I will: \"meeting with therapy 2x month\"  I will know I am making progress when: \"My routine is back to normal\"  I think I will be ready to discharge from the program when: complete below SMART goals  SMART goals/tasks:  9/30 reach out to individual therapist to schedule appointment for after discharge.  9/30 contact employer to request starting back part time.  Progress updates:  9/30 Lam has individual therapist but has paused services while he is in 55+ IOP.  He will contact therapist to schedule a session for after discharge.  He is anxious about returning to work full time.  He will ask his employer about initially working reduced hours upon his return.    11/18 Goal stopped due to discharge.  He will " "schedule appointments with outpatient providers.  He will follow through on a [facility] psychiatry referral.                     My Strengths:   \"Able to stay focused and to my goal achievements\"  Ways I can use these to further my goals: patient left blank    My Limitations or Vulnerabilities:  \"At times, can drift off the goal\"   How I might need to modify my plans to account for my limits: patient left blank    My Other Health Issues:  None reported    Supports:    I want to include the following support people in my treatment: \"wife\"  Please note we cannot share information with anyone unless you sign a release of information. You can specify what information can and cannot be shared and you can retract that written permission at any time.    Staff and support people can help me by: listening, feedback    Cultural Values and Needs: patient left blank    Assessments: The following assessments were completed by patient for this visit:  PHQ9:       7/6/2020    11:13 AM 7/16/2020    10:00 AM 9/7/2021     8:05 AM 9/7/2021     3:44 PM 7/11/2024     6:43 AM 9/16/2024    10:46 AM   PHQ-9 SCORE   PHQ-9 Total Score MyChart   21 (Severe depression) 16 (Moderately severe depression) 23 (Severe depression) 13 (Moderate depression)   PHQ-9 Total Score 13 10 21 16 23 13    13     GAD7:       7/6/2020    11:13 AM 7/16/2020    10:00 AM 9/7/2021     8:05 AM 9/7/2021     3:48 PM 7/11/2024     6:55 AM 9/16/2024    10:47 AM   JOSEPH-7 SCORE   Total Score   16 (severe anxiety) 14 (moderate anxiety) 20 (severe anxiety) 13 (moderate anxiety)   Total Score 7 4 16 14 20 13    13    13    13    13    13        Diagnosis/es:  DSM5 Diagnosis/es:      ICD-10-CM     1. MDD (major depressive disorder), recurrent episode, moderate (H)  F33.1      \"Anxiety, depression\"    Level of Care: Adult Mental Health Outpatient Programs  Program Track: IOP/DT 3 55+    Multidisciplinary Team Members: Team IOP DT 3:  Patrick Fine MD; EDILBERTO Flores;  " Shannan Gonzalez, [facility] CenterSW; Shaunna Harman, OTR/L; Yu Quintero RN     Program services: Group and Individual Psychotherapy (at least 1 hour per day), Patient Education and Training Related to Treatment (at least one hour per day), Psychiatric Evaluation and Management (at intake and least once per month)    Staff Interventions:  Assist to identify treatment goals, including identifying and problem-solving barriers. Assist in identifying strengths and how to mobilize them in meeting treatment goals. Assist in identifying limitations and other health concerns, and ways to manage those in the recovery process and beyond. Assist in identifying and helping to establish, maintain, and strengthen support network. Assist with and facilitate discharge planning, including connection with available and appropriate community services.      Ongoing psychotherapeutic and multidisciplinary assessment. Regular meetings with psychiatrist or other independent psychiatric provider. Assessment by registered nurse liaisons at admission and ongoing/as needed. Complete OT assessment where applicable/as needed. Complete functional assessment(s) where applicable/as needed.    Plan for and help with maintaining safety, including revising and updating written safety plan where applicable. Assist in identifying and applying coping skills. Assist in identifying and problem solving barriers to treatment and clinical progress. Utilize motivational interviewing techniques to promote change. Provide education to promote informed decisions and decision-making. Utilize motivational interviewing techniques to promote change.     Provide education regarding how to effectively use group process. Teach skills to help identify and manage thoughts, behaviors, and emotions, with specific skills/topics including: emotional regulation, identification of values, understanding and modifying distorted thinking, understanding and coping with grief and loss, shame,  self-compassion, self-awareness, mindfulness, radical acceptance, distress tolerance skills, hope, problem-solving, communication, interpersonal effectiveness, distress tolerance. Facilitate increased self-awareness.       Provide education regarding: life balance/structure/routine, goal setting and integration, prioritizing and planning, leisure values, behavior activation, motivation, energy management, stress management, neuroscience of change, sensory modulation, window of tolerance, ANS and vagus nerve activation, sensory enhanced mindfulness, sensory/body based grounding skills.    Provide education regarding: eight dimensions of wellness, sleep hygiene, medication education and management, stigma, nutrition, signs and symptoms, community resources, support network education.       Abuse Prevention Plan:   Treatment team will provide education regarding skill development to address symptom management, life skills, wellness, discharge planning, and personal safety.  Collaborate with patients internal and external providers to coordinate care.  Treatment will be provided in a safe, therapeutic environment.  Program provider will offer medication adjustment/management as needed/indicated.     Patient Participation in Plan:  This plan was developed by the patient named at the top of the document with assistance from the multidisciplinary care team. The patient agrees with this care plan, developed goals, and has received a copy. Supports and/or family have been invited to participate in the creation of this plan at the discretion of the patient.     Discharge Criteria:   Patient will discharge from the program when the goals above have been met, the patient is otherwise deemed to no longer need and/or benefit from the program/this level of care, another treatment modality is identified that would better meet treatment needs and goals, and/or the patient opts to discharge from the program for any  "reason.    Acknowledgement of Current Treatment Plan       I have reviewed my treatment plan with my treatment team members.  I agree with the plan as it is written in the electronic health record.     Name:                   Signature:                                                          Date:  Craig M Wilfahrt Michael Churchwell, MD  Psychiatrist/Medical Director         NOTE: Patient signatures are completed manually and scanned into the electronic medical record. See \"Media\" tab in epic.         "

## 2024-09-17 NOTE — GROUP NOTE
"Process Group Note    PATIENT'S NAME: Craig M Wilfahrt  MRN:   [id number]  :   1965  ACCT. NUMBER: [id number]  DATE OF SERVICE: 24  START TIME:  1:00 PM  END TIME:  1:50 PM  FACILITATOR: Roselyn Singh LICSW  TOPIC:  Process Group    Diagnoses:  DSM5 Diagnosis/es:      ICD-10-CM     1. MDD (major depressive disorder), recurrent episode, moderate (H)  F33.1        [facility] Adult Mental Health Outpatient Programs  TRACK: IOP/DT 3 55+    NUMBER OF PARTICIPANTS: 7        Data:    Session content: At the start of this group, patients were invited to check in by identifying themselves, describing their current emotional status, and identifying issues to address in this group.   Area(s) of treatment focus addressed in this session included Symptom Management, Personal Safety, and Community Resources/Discharge Planning.  Patient attended his first day of IOP/DT 3 55+. Writer oriented patient to group and outlined group expectations.  He disclosed participation in [facility] IOP in .  He reported a history of anxiety and depression. Reported mood is anxious.   Client denied safety concerns, including SI and SIB. Client denies any chemical use.  He reports having a beer \"every once in a while\" but denies concerns.  Client is taking medications as prescribed.  He will discuss medication concerns with Dr. Ballard in their appointment today. Client's goal is \"meet everyone in group\". Client talked about his two children, one who started a [employer] business and another who is attending dental school.  He enjoys going to his son in [employer] football games. Client is grateful for \"family support\". Peers offered supportive feedback and validation.    Therapeutic Interventions/Treatment Strategies:  Psychotherapist offered support, feedback and validation and reinforced use of skills. Treatment modalities used include Cognitive Behavioral Therapy and Dialectical Behavioral Therapy. Interventions include Coping " Skills: Assisted patient in understanding the purpose of planning / creating / participating / sharing in positive experiences and Symptoms Management: Promoted understanding of their diagnoses and how it impacts their functioning.    Assessment:    Patient response:   Patient responded to session by accepting feedback, giving feedback, listening, focusing on goals, being attentive, and accepting support    Possible barriers to participation / learning include: and no barriers identified    Health Issues:   None reported       Substance Use Review:   Substance Use: No active concerns identified.    Mental Status/Behavioral Observations  Appearance:   Appropriate   Eye Contact:   Good   Psychomotor Behavior: Normal   Attitude:   Cooperative  Interested Friendly Pleasant  Orientation:   All  Speech   Rate / Production: Normal    Volume:  Normal   Mood:    Anxious   Affect:    Appropriate   Thought Content:   Clear and Safety denies any current safety concerns including suicidal ideation, self-harm, and homicidal ideation  Thought Form:  Coherent  Logical     Insight:    Good     Plan:   Safety Plan: No current safety concerns identified.  Recommended that patient call 911 or go to the local ED should there be a change in any of these risk factors.   Barriers to treatment: None identified  Patient Contracts (see media tab):  None  Substance Use: Not addressed in session   Continue or Discharge: Patient will continue in 55+ Program (55+) as planned. Patient is likely to benefit from learning and using skills as they work toward the goals identified in their treatment plan.      Roselyn Singh, BETTIE  September 17, 2024

## 2024-09-18 ENCOUNTER — HOSPITAL ENCOUNTER (OUTPATIENT)
Dept: BEHAVIORAL HEALTH | Facility: CLINIC | Age: 59
Discharge: HOME | End: 2024-09-18
Attending: PSYCHIATRY & NEUROLOGY
Payer: COMMERCIAL

## 2024-09-18 PROCEDURE — 90853 GROUP PSYCHOTHERAPY: CPT | Performed by: SOCIAL WORKER

## 2024-09-18 PROCEDURE — 90853 GROUP PSYCHOTHERAPY: CPT

## 2024-09-18 NOTE — GROUP NOTE
Psychoeducation Group Note    PATIENT'S NAME: Craig M Wilfahrt  MRN:   [id number]  :   1965  ACCT. NUMBER: [id number]  DATE OF SERVICE: 24  START TIME:  3:00 PM  END TIME:  3:50 PM  FACILITATOR: Shannan Hays LICSW; Shaunna Harman OTR  TOPIC: MH Life Skills Group: Sensory Approaches in Mental Health  [facility] Mental Health Outpatient Programs  TRACK: IOP/DT 3    NUMBER OF PARTICIPANTS: 5    Summary of Group / Topics Discussed:  Sensory Approaches in Mental Health:  Sensory Approaches in Mental Health: Comfort Spaces: Patients were educated on the autonomic nervous system as a way to understand how the body reacts to feelings of stress, danger, and safety. Patients discussed the importance of safety for our body and for our overall mental wellness. Patients identified what makes them feel safe in their environment while exploring the 8 senses. Patient s self-reflected on what a comfort space (a safe place) for them would look like in their everyday life to decrease stress and improve self-regulation. Patients were provided with an experiential learning opportunity to increase awareness of their senses to create a supportive, healthy environment. Validation, support, and feedback were provided.     Patient Session Goals / Objectives:   Identified parts of a physical environment to increase feelings of safety.   Improved awareness of all 8 senses for self-regulation.   Established a plan to create a safe space in their physical environment.   Discussed with the group on how to generalize taught skills to their everyday life.       Patient Participation / Response:  Fully participated with the group by sharing personal reflections / insights and openly received / provided feedback with other participants.    Verbalized understanding of content    Treatment Plan:  Patient has a current master individualized treatment plan.  See Epic treatment plan for more information.    Shannan NORMAN  SONIYA HaysSW

## 2024-09-19 ENCOUNTER — HOSPITAL ENCOUNTER (OUTPATIENT)
Dept: BEHAVIORAL HEALTH | Facility: CLINIC | Age: 59
Discharge: HOME | End: 2024-09-19
Attending: PSYCHIATRY & NEUROLOGY
Payer: COMMERCIAL

## 2024-09-19 PROCEDURE — 90853 GROUP PSYCHOTHERAPY: CPT

## 2024-09-19 PROCEDURE — 90853 GROUP PSYCHOTHERAPY: CPT | Performed by: SOCIAL WORKER

## 2024-09-19 NOTE — GROUP NOTE
Psychotherapy Group Note    PATIENT'S NAME: Craig M Wilfahrt  MRN:   [id number]  :   1965  ACCT. NUMBER: [id number]  DATE OF SERVICE: 24  START TIME:  2:00 PM  END TIME:  2:50 PM  FACILITATOR: Roselyn Singh LICSW  TOPIC: MH EBP Group: Emotions Management  [facility] Mental Health Outpatient Programs  TRACK: IOP/DT 3 55+    NUMBER OF PARTICIPANTS: 6    Summary of Group / Topics Discussed:  Emotions Management: Opposite to Emotion: Patients discussed past and present struggles with knowing how to make changes in their lives due to difficult emotional experiences.  Explored desires to experience and feel less anger, sadness, guilt, and fear.  Reviewed the therapeutic skill of opposite action and patients explored opportunities to use their behaviors as a tool to reduce an emotion that they want to change.     Patient Session Goals / Objectives:  Review DBT concepts and focus on patient s experiences of distress and difficult emotional experiences.  Learn how to do the opposite of what an emotion makes us want to do in an effort to decrease an unwanted emotional experience.  Demonstrate understanding of the skill of opposite action by sharing experiences where the technique could be useful in past / present situations.      Patient Participation / Response:  Fully participated with the group by sharing personal reflections / insights and openly received / provided feedback with other participants.    Demonstrated understanding of topics discussed through group discussion and participation, Expressed understanding of the relevance / importance of emotions management skills at distressing times in life, and Self-aware of experiences with difficult emotions, and strategies to employ to manage them    Treatment Plan:  Patient has a current master individualized treatment plan.  See Epic treatment plan for more information.    BETTIE Flores

## 2024-09-19 NOTE — GROUP NOTE
Psychotherapy Group Note    PATIENT'S NAME: Craig M Wilfahrt  MRN:   [id number]  :   1965  ACCT. NUMBER: [id number]  DATE OF SERVICE: 24  START TIME:  2:00 PM  END TIME:  2:50 PM  FACILITATOR: Roselyn Singh LICSW  TOPIC: MH EBP Group: Specialty [facility] Outpatient [facility]  TRACK: IOP/DT 3 55+    NUMBER OF PARTICIPANTS: 7    Summary of Group / Topics Discussed:  Specialty Topics: Life Transitions: The topic of life transitions was presented in order to help patients to better understand the challenges presented by life transitions, and how to best navigate them. Exploring the phases of transition and how one works through them was discussed. Patients were provided with information regarding community resources.     Patient Session Goals / Objectives:  Discussed the timing and nature of major life transitions  Explored how life transitions may impact mental health and functioning  Discussed coping strategies to manage symptoms and help with transitioning  Discussed and planned a successful transition      Patient Participation / Response:  Fully participated with the group by sharing personal reflections / insights and openly received / provided feedback with other participants.    Demonstrated understanding of topics discussed through group discussion and participation, Identified / Expressed readiness to act on skill suggestions discussed in topic, Verbalized understanding of ways to proactively manage illness, and Identified plan to address barriers to practicing skills discussed in topic    Treatment Plan:  Patient has a current master individualized treatment plan.  See Epic treatment plan for more information.    BETTIE Flores

## 2024-09-19 NOTE — GROUP NOTE
"Process Group Note    PATIENT'S NAME: Craig M Wilfahrt  MRN:   [id number]  :   1965  ACCT. NUMBER: [id number]  DATE OF SERVICE: 24  START TIME:  1:00 PM  END TIME:  1:50 PM  FACILITATOR: Roselyn Singh LICSW  TOPIC: MH Process Group    Diagnoses:  DSM5 Diagnosis/es:      ICD-10-CM     1. MDD (major depressive disorder), recurrent episode, moderate (H)  F33.1        [facility]  TRACK: IOP/DT 3 55+    NUMBER OF PARTICIPANTS: 7        Data:    Session content: At the start of this group, patients were invited to check in by identifying themselves, describing their current emotional status, and identifying issues to address in this group.   Area(s) of treatment focus addressed in this session included Symptom Management, Personal Safety, and Community Resources/Discharge Planning.  Reported mood is \"angry\".  He reports barriers to repairing his car tire this morning.  He hopes to pick it up after group or tomorrow.   Client denied safety concerns, including SI and SIB. Client had a beer last night while cooking dinner for his wife and daughter.  Client is taking medications as prescribed. Client's goal is to connect with his daughter after her dental school interview.  Client reported plans to use the following coping skills: time management (e.g. he left earlier to get to group today, reducing anxiety). Peers offered supportive feedback and validation.    Therapeutic Interventions/Treatment Strategies:  Psychotherapist offered support, feedback and validation and reinforced use of skills. Treatment modalities used include Cognitive Behavioral Therapy and Dialectical Behavioral Therapy. Interventions include Behavioral Activation: Encouraged strategies to reduce individual procrastination and increase motivation by increasing goal-directed activities to enhance mood and reduce symptoms. and Emotions Management:  Discussed barriers to emotional " regulation.    Assessment:    Patient response:   Patient responded to session by accepting feedback, listening, focusing on goals, being attentive, and accepting support    Possible barriers to participation / learning include: and no barriers identified    Health Issues:   None reported       Substance Use Review:   Substance Use: No active concerns identified.    Mental Status/Behavioral Observations  Appearance:   Appropriate   Eye Contact:   Good   Psychomotor Behavior: Normal   Attitude:   Cooperative  Interested Friendly Pleasant  Orientation:   All  Speech   Rate / Production: Normal    Volume:  Normal   Mood:    Angry  Anxious   Affect:    Appropriate   Thought Content:   Clear and Safety denies any current safety concerns including suicidal ideation, self-harm, and homicidal ideation  Thought Form:  Coherent  Logical     Insight:    Good     Plan:   Safety Plan: No current safety concerns identified.  Recommended that patient call 911 or go to the local ED should there be a change in any of these risk factors.   Barriers to treatment: None identified  Patient Contracts (see media tab):  None  Substance Use: Not addressed in session   Continue or Discharge: Patient will continue in 55+ Program (55+) as planned. Patient is likely to benefit from learning and using skills as they work toward the goals identified in their treatment plan.      BETTIE Flores  September 19, 2024

## 2024-09-19 NOTE — GROUP NOTE
Psychoeducation Group Note    PATIENT'S NAME: Craig M Wilfahrt  MRN:   [id number]  :   1965  ACCT. NUMBER: [id number]  DATE OF SERVICE: 24  START TIME:  3:00 PM  END TIME:  3:50 PM  FACILITATOR: Shannan Hays LICSW; Annabella Manjarrez RN  TOPIC: MH Wellness Group: Health Maintenance  [facility] Adult [facility] Outpatient Programs  TRACK: IOP/DT 3    NUMBER OF PARTICIPANTS: 7    Summary of Group / Topics Discussed:  Health Maintenance: Discharge planning/Community resources Part II: Patients worked on completing an instructor-facilitated discharge planning activity. Discharge planning begins for all patients after admission. Competent discharge planning promotes a successful transition and decreases the likelihood of mental health relapse. In this group, all dimensions of wellness were reviewed to assess for needs/discharge readiness. These dimensions included: physical, emotional, occupational/productivity, environmental, social, spiritual, intellectual, and financial. Patients worked on completing/updating their discharge planning and identifying their treatment needs prior to time of discharge.     Patient Session Goals / Objectives:  Identified unmet treatment needs to accomplish before discharge  Completed all dimensions of the discharge planning packet  Participated in the planning process, make phone calls, set up appointments, got connected with community resources, followed up with treatment team as needed     Discussed and practiced the benefits of a mindful practice.      Patient Participation / Response:  Fully participated with the group by sharing personal reflections / insights and openly received / provided feedback with other participants.    Demonstrated understanding of topics discussed through group discussion and participation and Verbalized understanding of health maintenance topic    Treatment Plan:  Patient has a current master individualized treatment plan.  See  Epic treatment plan for more information.    Shannan Hays, LICSW

## 2024-09-19 NOTE — GROUP NOTE
"Process Group Note    PATIENT'S NAME: Craig M Wilfahrt  MRN:   [id number]  :   1965  ACCT. NUMBER: [id number]  DATE OF SERVICE: 24  START TIME:  1:00 PM  END TIME:  1:50 PM  FACILITATOR: Roselyn Singh LICSW  TOPIC:  Process Group    Diagnoses:  DSM5 Diagnosis/es:      ICD-10-CM     1. MDD (major depressive disorder), recurrent episode, moderate (H)  F33.1        [facility] Outpatient Programs  TRACK: IOP/DTR 3 55+    NUMBER OF PARTICIPANTS: 7        Data:    Session content: At the start of this group, patients were invited to check in by identifying themselves, describing their current emotional status, and identifying issues to address in this group.   Area(s) of treatment focus addressed in this session included Symptom Management, Personal Safety, and Community Resources/Discharge Planning.  Reported mood is \"anxious\".  Main stressor is having a leaky tire on his car.   Client denied safety concerns, including SI and SIB. Client denies any chemical use.  Client is taking medications as prescribed. Client's goal is to fix his tire before group tomorrow.  Client reported plans to use the following coping skills: radical acceptance.  Client is proud of making dinner for his wife and daughter last night.  He is proud of his daughter for having a dental school interview tomorrow morning. Peers offered supportive feedback and validation.    Therapeutic Interventions/Treatment Strategies:  Psychotherapist offered support, feedback and validation and reinforced use of skills. Treatment modalities used include Cognitive Behavioral Therapy and Dialectical Behavioral Therapy. Interventions include Behavioral Activation: Encouraged strategies to reduce individual procrastination and increase motivation by increasing goal-directed activities to enhance mood and reduce symptoms. and Coping Skills: Facilitated discussion on learning and applying radical acceptance " skill.    Assessment:    Patient response:   Patient responded to session by accepting feedback, giving feedback, listening, focusing on goals, being attentive, and accepting support    Possible barriers to participation / learning include: and no barriers identified    Health Issues:   None reported       Substance Use Review:   Substance Use: No active concerns identified.    Mental Status/Behavioral Observations  Appearance:   Appropriate   Eye Contact:   Good   Psychomotor Behavior: Normal   Attitude:   Cooperative  Interested Friendly Pleasant  Orientation:   All  Speech   Rate / Production: Normal    Volume:  Normal   Mood:    Anxious   Affect:    Appropriate   Thought Content:   Clear and Safety denies any current safety concerns including suicidal ideation, self-harm, and homicidal ideation  Thought Form:  Coherent  Logical     Insight:    Good     Plan:   Safety Plan: No current safety concerns identified.  Recommended that patient call 911 or go to the local ED should there be a change in any of these risk factors.   Barriers to treatment: None identified  Patient Contracts (see media tab):  None  Substance Use: Not addressed in session   Continue or Discharge: Patient will continue in 55+ Program (55+) as planned. Patient is likely to benefit from learning and using skills as they work toward the goals identified in their treatment plan.      BETTIE Flores  September 19, 2024

## 2024-09-20 ENCOUNTER — HOSPITAL ENCOUNTER (OUTPATIENT)
Dept: BEHAVIORAL HEALTH | Facility: CLINIC | Age: 59
Discharge: HOME | End: 2024-09-20
Attending: PSYCHIATRY & NEUROLOGY
Payer: COMMERCIAL

## 2024-09-20 PROCEDURE — 90853 GROUP PSYCHOTHERAPY: CPT

## 2024-09-20 PROCEDURE — 90853 GROUP PSYCHOTHERAPY: CPT | Performed by: SOCIAL WORKER

## 2024-09-20 NOTE — GROUP NOTE
"Process Group Note    PATIENT'S NAME: Craig M Wilfahrt  MRN:   [id number]  :   1965  ACCT. NUMBER: [id number]  DATE OF SERVICE: 24  START TIME:  1:00 PM  END TIME:  1:50 PM  FACILITATOR: Roselyn Singh LICSW  TOPIC:  Process Group    Diagnoses:  DSM5 Diagnosis/es:      ICD-10-CM     1. MDD (major depressive disorder), recurrent episode, moderate (H)  F33.1        [facility] Programs  TRACK: IOP/DT 3 55+    NUMBER OF PARTICIPANTS: 8        Data:    Session content: At the start of this group, patients were invited to check in by identifying themselves, describing their current emotional status, and identifying issues to address in this group.   Area(s) of treatment focus addressed in this session included Symptom Management, Personal Safety, and Community Resources/Discharge Planning.  Reported mood is \"good\".  He rates his depression and anxiety a 3/10.  He is grateful his tire issue is resolved.  He is hopeful after talking with his daughter about her dental school interview.   Client denied safety concerns, including SI and SIB. Client denies any chemical use.  Client is taking medications as prescribed. Client's goal is to plan for the weekend.  He is preparing to go to [place], [place] to watch his son in law play football.  Client reported plans to use the following coping skills: organizational skills, behavior activation. Client talked about setting a boundary with his son to not use his car for work purposes. Peers offered supportive feedback and validation.    Therapeutic Interventions/Treatment Strategies:  Psychotherapist offered support, feedback and validation and reinforced use of skills. Treatment modalities used include Cognitive Behavioral Therapy and Dialectical Behavioral Therapy. Interventions include Behavioral Activation: Encouraged strategies to reduce individual procrastination and increase motivation by increasing goal-directed activities to " enhance mood and reduce symptoms., Coping Skills: Assisted patient in understanding the purpose of planning / creating / participating / sharing in positive experiences, and Relationship Skills: Assisted patients in implementing more effective communication skills in their relationships.    Assessment:    Patient response:   Patient responded to session by accepting feedback, giving feedback, listening, focusing on goals, being attentive, and accepting support    Possible barriers to participation / learning include: and no barriers identified    Health Issues:   None reported       Substance Use Review:   Substance Use: No active concerns identified.    Mental Status/Behavioral Observations  Appearance:   Appropriate   Eye Contact:   Good   Psychomotor Behavior: Normal   Attitude:   Cooperative  Interested Friendly Pleasant  Orientation:   All  Speech   Rate / Production: Normal    Volume:  Normal   Mood:    Anxious  Depressed   Affect:    Appropriate   Thought Content:   Clear and Safety denies any current safety concerns including suicidal ideation, self-harm, and homicidal ideation  Thought Form:  Coherent  Logical     Insight:    Good     Plan:   Safety Plan: No current safety concerns identified.  Recommended that patient call 911 or go to the local ED should there be a change in any of these risk factors.   Barriers to treatment: None identified  Patient Contracts (see media tab):  None  Substance Use: Not addressed in session   Continue or Discharge: Patient will continue in 55+ Program (55+) as planned. Patient is likely to benefit from learning and using skills as they work toward the goals identified in their treatment plan.      Roselyn Singh, BETTIE  September 20, 2024

## 2024-09-20 NOTE — GROUP NOTE
Psychotherapy Group Note    PATIENT'S NAME: Craig M Wilfahrt  MRN:   [id number]  :   1965  ACCT. NUMBER: [id number]  DATE OF SERVICE: 24  START TIME:  2:00 PM  END TIME:  2:50 PM  FACILITATOR: Roselyn Singh LICSW  TOPIC: MH EBP Group: Specialty [facility] Programs  TRACK: IOP/DT 3 55+    NUMBER OF PARTICIPANTS: 8    Summary of Group / Topics Discussed:  Specialty Topics: Life Transitions part 2: The topic of life transitions was presented in order to help patients to better understand the challenges presented by life transitions, and how to best navigate them. Exploring the phases of transition and how one works through them was discussed. Patients were provided with information regarding community resources.     Patient Session Goals / Objectives:  Discussed the timing and nature of major life transitions  Explored how life transitions may impact mental health and functioning  Discussed coping strategies to manage symptoms and help with transitioning  Discussed and planned a successful transition      Patient Participation / Response:  Fully participated with the group by sharing personal reflections / insights and openly received / provided feedback with other participants.    Demonstrated understanding of topics discussed through group discussion and participation, Identified / Expressed readiness to act on skill suggestions discussed in topic, Verbalized understanding of ways to proactively manage illness, and Identified plan to address barriers to practicing skills discussed in topic    Treatment Plan:  Patient has a current master individualized treatment plan.  See Epic treatment plan for more information.    BETTIE Flores

## 2024-09-20 NOTE — GROUP NOTE
Psychoeducation Group Note    PATIENT'S NAME: Craig M Wilfahrt  MRN:   [id number]  :   1965  ACCT. NUMBER: [id number]  DATE OF SERVICE: 24  START TIME:  3:00 PM  END TIME:  3:50 PM  FACILITATOR: Shannan Hays LICSW; Shaunna Harman OTR; Latanya Hale OT  TOPIC: MH PHP OT Group: Lifestyle Balance and Structure  [facility]  TRACK: IOP/DT 3    NUMBER OF PARTICIPANTS: 7    Summary of Group / Topics Discussed:  Lifestyle Balance and Structure:  Lifestyle Balance and Structure: Procrastination: Patients discussed the impact of procrastination on their daily function. Patients explored  different motivation strategies to improve engagement in daily life. Patients self-reflected on daily tasks they have been procrastinating and identified a motivation strategy they would like to try. Patients created a goal and the first step into decreasing procrastination and improving motivation in their daily life. Validation, support, and feedback were provided throughout group.      Patient Session Goals / Objectives:   Identify current patterns of procrastination behavior and how they influence thoughts and moods and inhibit motivation.   Identify behaviors that can be implemented that contribute to improving thoughts and feelings, motivation, and reduce symptoms.   Identify and develop a plan to increase activities that promote a sense of accomplishment and competence.   Practice scheduling positive activities / behaviors into daily routines.       Patient Participation / Response:  Fully participated with the group by sharing personal reflections / insights and openly received / provided feedback with other participants.    Verbalized understanding of content    Treatment Plan:  Patient has a current master individualized treatment plan.  See Epic treatment plan for more information.    BETTIE Jasso

## 2024-09-25 ENCOUNTER — HOSPITAL ENCOUNTER (OUTPATIENT)
Dept: BEHAVIORAL HEALTH | Facility: CLINIC | Age: 59
Discharge: HOME | End: 2024-09-25
Attending: PSYCHIATRY & NEUROLOGY
Payer: COMMERCIAL

## 2024-09-25 PROCEDURE — 90853 GROUP PSYCHOTHERAPY: CPT | Performed by: SOCIAL WORKER

## 2024-09-25 PROCEDURE — 90853 GROUP PSYCHOTHERAPY: CPT

## 2024-09-25 NOTE — GROUP NOTE
Psychotherapy Group Note    PATIENT'S NAME: Craig M Wilfahrt  MRN:   [id number]  :   1965  ACCT. NUMBER: [id number]  DATE OF SERVICE: 24  START TIME:  2:00 PM  END TIME:  2:50 PM  FACILITATOR: Roselyn Singh LICSW  TOPIC:  EBP Group: Symptom Awareness  [facility] Outpatient Programs  TRACK: IOP/DT 3 55+    NUMBER OF PARTICIPANTS: 9    Summary of Group / Topics Discussed:  Symptom Awareness: Mood Disorders: Patients received a general overview of mood disorders including depressive disorders, anxiety disorders, and bipolar disorders and how it relates to their current symptoms. The purpose is to promote understanding of their diagnoses and how it impacts their functioning. Patients reviewed their current awareness of symptoms and diagnoses. Patients received information regarding diagnoses, etiology, cultural, and environmental factors as well as impact on functioning.     Patient Session Goals / Objectives:  Discussed patient individual symptoms and experiences  Reviewed diagnostic criteria and etiology of diagnoses       Patient Participation / Response:  Fully participated with the group by sharing personal reflections / insights and openly received / provided feedback with other participants.    Demonstrated understanding of topics discussed through group discussion and participation, Demonstrated understanding of how information regarding symptoms can assist in management of symptoms, Identified / Expressed personal readiness to increase awareness of symptoms and apply skills as necessary, and Verbalized understanding of how awareness can benefit mental health symptoms     Treatment Plan:  Patient has a current master individualized treatment plan.  See Epic treatment plan for more information.    BETTIE Flores

## 2024-09-25 NOTE — GROUP NOTE
"Process Group Note    PATIENT'S NAME: Craig M Wilfahrt  MRN:   [id number]  :   1965  ACCT. NUMBER: [id number]  DATE OF SERVICE: 24  START TIME:  1:00 PM  END TIME:  1:50 PM  FACILITATOR: Roselyn Singh LICSW  TOPIC:  Process Group    Diagnoses:  DSM5 Diagnosis/es:      ICD-10-CM     1. MDD (major depressive disorder), recurrent episode, moderate (H)  F33.1        [facility]  TRACK: IOP/DT 3 55+    NUMBER OF PARTICIPANTS: 9        Data:    Session content: At the start of this group, patients were invited to check in by identifying themselves, describing their current emotional status, and identifying issues to address in this group.   Area(s) of treatment focus addressed in this session included Symptom Management, Personal Safety, and Community Resources/Discharge Planning.  Reported mood is \"anxious\".  He felt regretful for not attending group on Monday.  He shared that he was helping his son completing a yard work project earlier that day and was too tired to come to group.   Client denied safety concerns, including SI and SIB. Client denies any chemical use.  Client is taking medications as prescribed. Client's goal is \"stay active\".  Client reported plans to use the following coping skills: talk with daughter, walk dogs. Client talked about having a follow up appointment with Dr. Ballard to discuss an increased dose of medication.  Peers offered supportive feedback and validation.    Therapeutic Interventions/Treatment Strategies:  Psychotherapist offered support, feedback and validation and reinforced use of skills. Treatment modalities used include Cognitive Behavioral Therapy and Dialectical Behavioral Therapy. Interventions include Behavioral Activation: Explored how behaviors effect mood and interact with thoughts and feelings and Encouraged strategies to reduce individual procrastination and increase motivation by increasing goal-directed " activities to enhance mood and reduce symptoms..    Assessment:    Patient response:   Patient responded to session by accepting feedback, giving feedback, listening, focusing on goals, being attentive, and accepting support    Possible barriers to participation / learning include: and no barriers identified    Health Issues:   None reported       Substance Use Review:   Substance Use: No active concerns identified.    Mental Status/Behavioral Observations  Appearance:   Appropriate   Eye Contact:   Good   Psychomotor Behavior: Normal   Attitude:   Cooperative  Interested Friendly Pleasant  Orientation:   All  Speech   Rate / Production: Normal    Volume:  Normal   Mood:    Anxious   Affect:    Appropriate   Thought Content:   Clear and Safety denies any current safety concerns including suicidal ideation, self-harm, and homicidal ideation  Thought Form:  Coherent  Logical     Insight:    Good     Plan:   Safety Plan: No current safety concerns identified.  Recommended that patient call 911 or go to the local ED should there be a change in any of these risk factors.   Barriers to treatment: None identified  Patient Contracts (see media tab):  None  Substance Use: Not addressed in session   Continue or Discharge: Patient will continue in 55+ Program (55+) as planned. Patient is likely to benefit from learning and using skills as they work toward the goals identified in their treatment plan.      Roselyn Singh, BETTIE  September 25, 2024

## 2024-09-25 NOTE — GROUP NOTE
Psychoeducation Group Note    PATIENT'S NAME: Craig M Wilfahrt  MRN:   [id number]  :   1965  ACCT. NUMBER: [id number]  DATE OF SERVICE: 24  START TIME:  3:00 PM  END TIME:  3:50 PM  FACILITATOR: Shannan Hays LICSW  TOPIC: MH Life Skills Group: Resiliency Development  [facility]  TRACK: IOP/DT 3    NUMBER OF PARTICIPANTS: 7    Summary of Group / Topics Discussed:  Resiliency Development:  Resiliency Development: Provided education and facilitated discussion on self-compassion to combat negative thoughts and improve overall mental wellness. Facilitated a self-reflection process on self-compassion strategies that they are using in their daily life. Explored and discussed 11 different self-compassion strategies with peers. Completed skill exploration of self-compassion exercise to gain self-awareness and identify benefits of skill usage in their daily life. Validation and support provided throughout group process.     Patient Session Goals / Objectives:   Identify current self-compassion strategies in their daily life.   Engage in experiential practice of self-compassion to identify benefits and future use.   Identify and reflect on self-compassion strategies they will use in their daily life to improve feelings of self-confidence and to use as a source of coping/resiliency.       Patient Participation / Response:  Fully participated with the group by sharing personal reflections / insights and openly received / provided feedback with other participants.    Verbalized understanding of content    Treatment Plan:  Patient has a current master individualized treatment plan.  See Epic treatment plan for more information.    BETTIE Jasso

## 2024-09-26 ENCOUNTER — HOSPITAL ENCOUNTER (OUTPATIENT)
Dept: BEHAVIORAL HEALTH | Facility: CLINIC | Age: 59
Discharge: HOME | End: 2024-09-26
Attending: PSYCHIATRY & NEUROLOGY
Payer: COMMERCIAL

## 2024-09-26 PROCEDURE — 90853 GROUP PSYCHOTHERAPY: CPT | Performed by: SOCIAL WORKER

## 2024-09-26 PROCEDURE — 90853 GROUP PSYCHOTHERAPY: CPT

## 2024-09-26 NOTE — GROUP NOTE
Psychotherapy Group Note    PATIENT'S NAME: Craig M Wilfahrt  MRN:   [id number]  :   1965  ACCT. NUMBER: [id number]  DATE OF SERVICE: 24  START TIME:  2:00 PM  END TIME:  2:50 PM  FACILITATOR: Roselyn Singh LICSW  TOPIC: MH EBP Group: Coping Skills  [facility]  TRACK: IOP/DT 3 55+    NUMBER OF PARTICIPANTS: 9    Summary of Group / Topics Discussed:  Coping Skills: Additional Coping Skills:  Patients discussed and practiced the distress tolerance skill of Willingness vs. Willfulness.  Reviewed the benefits of applying the aforementioned DBT coping strategies.  Patients explored how these strategies might be applied to daily stressors or distressing situations as well as having more acceptance.    Patient Session Goals / Objectives:       Understand the purpose and benefits of applying willingness coping strategies       Identified traits of willfulness.       Introduced and practiced half smile and hands up tools.      Patient Participation / Response:  Fully participated with the group by sharing personal reflections / insights and openly received / provided feedback with other participants.    Demonstrated understanding of topics discussed through group discussion and participation, Expressed understanding of the relevance / importance of coping skills at distressing times in life, Demonstrated knowledge of when to consider using a variety of coping skills in daily life, Identified barriers to applying coping skills, Identified 2-3 positive coping strategies that have helped maintain / improve symptoms in the past, and Practiced 2-3 new coping skills in session    Treatment Plan:  Patient has a current master individualized treatment plan.  See Epic treatment plan for more information.    BETTIE Flores

## 2024-09-26 NOTE — GROUP NOTE
"Process Group Note    PATIENT'S NAME: Craig M Wilfahrt  MRN:   [id number]  :   1965  ACCT. NUMBER: [id number]  DATE OF SERVICE: 24  START TIME:  1:00 PM  END TIME:  1:50 PM  FACILITATOR: Roselyn Singh LICSW  TOPIC:  Process Group    Diagnoses:  DSM5 Diagnosis/es:      ICD-10-CM     1. MDD (major depressive disorder), recurrent episode, moderate (H)  F33.1        [facility]  TRACK: IOP/DT 3 55+    NUMBER OF PARTICIPANTS: 9        Data:    Session content: At the start of this group, patients were invited to check in by identifying themselves, describing their current emotional status, and identifying issues to address in this group.   Area(s) of treatment focus addressed in this session included Symptom Management, Personal Safety, and Community Resources/Discharge Planning.  Reported mood is \"no depression, a little anxiety\".  Main stressor was hour commute to group.  He reported a productive morning with banking, errands and a walk with his daughter and dogs.   Client denied safety concerns, including SI and SIB. Client denies any chemical use.  Client is taking medications as prescribed. Client's goal is \"be independent at home, do tasks on my own\". Writer explored intrinsic and extrinsic motivators that did not involve the prompting of family members. Client talked about being more willing to explore medication changes next week with Dr. Ballard.  Peers offered supportive feedback and validation.    Therapeutic Interventions/Treatment Strategies:  Psychotherapist offered support, feedback and validation and reinforced use of skills. Treatment modalities used include Cognitive Behavioral Therapy and Dialectical Behavioral Therapy. Interventions include Behavioral Activation: Explored how behaviors effect mood and interact with thoughts and feelings and Encouraged strategies to reduce individual procrastination and increase motivation by increasing " goal-directed activities to enhance mood and reduce symptoms..    Assessment:    Patient response:   Patient responded to session by accepting feedback, giving feedback, listening, focusing on goals, being attentive, and accepting support    Possible barriers to participation / learning include: and no barriers identified    Health Issues:   None reported       Substance Use Review:   Substance Use: No active concerns identified.    Mental Status/Behavioral Observations  Appearance:   Appropriate   Eye Contact:   Good   Psychomotor Behavior: Normal   Attitude:   Cooperative  Interested Friendly Pleasant  Orientation:   All  Speech   Rate / Production: Normal    Volume:  Normal   Mood:    Anxious   Affect:    Appropriate   Thought Content:   Clear and Safety denies any current safety concerns including suicidal ideation, self-harm, and homicidal ideation  Thought Form:  Coherent  Logical     Insight:    Good     Plan:   Safety Plan: No current safety concerns identified.  Recommended that patient call 911 or go to the local ED should there be a change in any of these risk factors.   Barriers to treatment: None identified  Patient Contracts (see media tab):  None  Substance Use: Not addressed in session   Continue or Discharge: Patient will continue in 55+ Program (55+) as planned. Patient is likely to benefit from learning and using skills as they work toward the goals identified in their treatment plan.      Roselyn Singh, BETTIE  September 26, 2024

## 2024-09-26 NOTE — GROUP NOTE
Psychoeducation Group Note    PATIENT'S NAME: Craig M Wilfahrt  MRN:   [id number]  :   1965  ACCT. NUMBER: [id number]  DATE OF SERVICE: 24  START TIME:  3:00 PM  END TIME:  3:50 PM  FACILITATOR: Shannan Hays LICSW; Annabella Manjarrez RN  TOPIC:  Wellness Group: Brain Health  [facility]  TRACK: IOP/DT 3    NUMBER OF PARTICIPANTS: 9    Summary of Group / Topics Discussed:  Brain Health:  Pathophysiology of Mood Disorders Part II: Patients were educated on mood disorder etiology and neuroscience, risk factors, symptoms, and pharmacologic, psychotherapeutic, and complementary treatment options. Patients were guided on a discussion of mental, behavioral, and physical symptoms and shared their symptoms with the group.     Patient Session Goals / Objectives:  Described what mood disorders are and identified risk factors   Explained how chemical imbalances in the brain can cause symptoms and how medications work to reverse this imbalance   Identified and described pharmacologic, psychotherapeutic, and complementary treatment options      Patient Participation / Response:  Fully participated with the group by sharing personal reflections / insights and openly received / provided feedback with other participants.    Demonstrated understanding of topics discussed through group discussion and participation and Verbalized understanding of brain health topic    Treatment Plan:  Patient has a current master individualized treatment plan.  See Epic treatment plan for more information.    BETTIE Jasso

## 2024-09-27 ENCOUNTER — HOSPITAL ENCOUNTER (OUTPATIENT)
Dept: BEHAVIORAL HEALTH | Facility: CLINIC | Age: 59
Discharge: HOME | End: 2024-09-27
Attending: PSYCHIATRY & NEUROLOGY
Payer: COMMERCIAL

## 2024-09-27 PROCEDURE — 90853 GROUP PSYCHOTHERAPY: CPT

## 2024-09-27 PROCEDURE — 90853 GROUP PSYCHOTHERAPY: CPT | Performed by: SOCIAL WORKER

## 2024-09-27 NOTE — GROUP NOTE
Psychotherapy Group Note    PATIENT'S NAME: Craig M Wilfahrt  MRN:   [id number]  :   1965  ACCT. NUMBER: [id number]  DATE OF SERVICE: 24  START TIME:  2:00 PM  END TIME:  2:50 PM  FACILITATOR: Roselyn Singh LICSW  TOPIC: MH EBP Group: Coping Skills  [facility]  TRACK: IOP/DT 3 55+    NUMBER OF PARTICIPANTS: 8    Summary of Group / Topics Discussed:  Coping Skills: Grounding: Patients discussed and practiced strategies to increase attachment / presence to the current moment.  Patients identified situations in which using these strategies will help improve emotion regulation sense of calm in the body.  Reviewed the benefits of applying grounding strategies, as well as past / current practices of each member.  Patients identified situations in which using these strategies would reduce stress. They developed the ability to distinguish when these strategies can be useful in their lives for management and stress and psychological well-being.    Patient Session Goals / Objectives:  Understand the purpose of using grounding strategies to reduce stress.  Verbalize understanding of how and when to apply grounding strategies to reduce distress and increase presence in the moment.  Review patients current grounding practices and discuss a more formal way of practicing and accessing skills.  Practice using various calming strategies (e.g. 5-4-3-2-1; mental and body awareness).  Choose 1-2 grounding strategies to apply during times of distress.      Patient Participation / Response:  Fully participated with the group by sharing personal reflections / insights and openly received / provided feedback with other participants.    Demonstrated understanding of topics discussed through group discussion and participation, Expressed understanding of the relevance / importance of coping skills at distressing times in life, Demonstrated knowledge of when to consider using a  variety of coping skills in daily life, Identified barriers to applying coping skills, Identified 2-3 positive coping strategies that have helped maintain / improve symptoms in the past, and Practiced 2-3 new coping skills in session    Treatment Plan:  Patient has a current master individualized treatment plan.  See Epic treatment plan for more information.    SONIYA FloresSW

## 2024-09-27 NOTE — GROUP NOTE
Process Group Note    PATIENT'S NAME: Craig M Wilfahrt  MRN:   [id number]  :   1965  ACCT. NUMBER: [id number]  DATE OF SERVICE: 24  START TIME:  1:00 PM  END TIME:  1:50 PM  FACILITATOR: Roselyn Singh LICSW  TOPIC:  Process Group    Diagnoses:  DSM5 Diagnosis/es:      ICD-10-CM     1. MDD (major depressive disorder), recurrent episode, moderate (H)  F33.1        [facility]  TRACK: IOP/DT 3 55+    NUMBER OF PARTICIPANTS: 9        Data:    Session content: At the start of this group, patients were invited to check in by identifying themselves, describing their current emotional status, and identifying issues to address in this group.   Area(s) of treatment focus addressed in this session included Symptom Management, Personal Safety, and Community Resources/Discharge Planning.  Reported mood is stable.  He is looking forward to seeing his son in law play football this weekend.   Client denied safety concerns, including SI and SIB. Client denies any chemical use.  Client is taking medications as prescribed, although he forgot his evening dose due to falling asleep on the couch last night. Client's goal is prepare for his trip to [place], [place] (pack, car wash, getting gas).  Client reported plans to use the following coping skills: organization, time management. Peers offered supportive feedback and validation.    Therapeutic Interventions/Treatment Strategies:  Psychotherapist offered support, feedback and validation and reinforced use of skills. Treatment modalities used include Cognitive Behavioral Therapy and Dialectical Behavioral Therapy. Interventions include Behavioral Activation: Explored how behaviors effect mood and interact with thoughts and feelings and Encouraged strategies to reduce individual procrastination and increase motivation by increasing goal-directed activities to enhance mood and reduce symptoms..    Assessment:    Patient response:    Patient responded to session by accepting feedback, giving feedback, listening, focusing on goals, being attentive, and accepting support    Possible barriers to participation / learning include: and no barriers identified    Health Issues:   None reported       Substance Use Review:   Substance Use: No active concerns identified.    Mental Status/Behavioral Observations  Appearance:   Appropriate   Eye Contact:   Good   Psychomotor Behavior: Normal   Attitude:   Cooperative  Interested Friendly Pleasant  Orientation:   All  Speech   Rate / Production: Normal/ Responsive Normal    Volume:  Normal   Mood:    Anxious   Affect:    Appropriate   Thought Content:   Clear and Safety denies any current safety concerns including suicidal ideation, self-harm, and homicidal ideation  Thought Form:  Coherent  Logical     Insight:    Good     Plan:   Safety Plan: No current safety concerns identified.  Recommended that patient call 911 or go to the local ED should there be a change in any of these risk factors.   Barriers to treatment: None identified  Patient Contracts (see media tab):  None  Substance Use: Not addressed in session   Continue or Discharge: Patient will continue in 55+ Program (55+) as planned. Patient is likely to benefit from learning and using skills as they work toward the goals identified in their treatment plan.      Roselyn Singh, [facility]  September 27, 2024

## 2024-09-30 ENCOUNTER — HOSPITAL ENCOUNTER (OUTPATIENT)
Dept: BEHAVIORAL HEALTH | Facility: CLINIC | Age: 59
Discharge: HOME | End: 2024-09-30
Attending: PSYCHIATRY & NEUROLOGY
Payer: COMMERCIAL

## 2024-09-30 PROCEDURE — 90853 GROUP PSYCHOTHERAPY: CPT

## 2024-09-30 PROCEDURE — 90853 GROUP PSYCHOTHERAPY: CPT | Performed by: SOCIAL WORKER

## 2024-09-30 NOTE — GROUP NOTE
Psychoeducation Group Note    PATIENT'S NAME: Craig M Wilfahrt  MRN:   [id number]  :   1965  ACCT. NUMBER: [id number]  DATE OF SERVICE: 24  START TIME:  2:00 PM  END TIME:  2:50 PM  FACILITATOR: Roselyn Singh LICSW  TOPIC: MH Life Skills Group: Lifestyle Balance and Structure  [facility]  TRACK: IOP/DT 3 55+    NUMBER OF PARTICIPANTS: 9    Summary of Group / Topics Discussed:  Lifestyle Balance and Strucure:  Goal-setting & integration: Patients were introduced to the process and benefits of setting realistic, timely, and achievable goals to help support their ability to follow through with meaningful personal, health, recovery and treatment goals that they would like to achieve to improve overall functioning.  Patients were also taught and then practiced techniques to manage a variety of obstacles to achieve their goals and how to break goals into manageable pieces.  Patients also reported on follow through of goals.     Patient Session Goals / Objectives:  Facilitated the creation of a vision for recovery and wellbeing  Identified and wrote meaningful SMART goals to engage in meaningful activities of daily living   Identified and problem solved barriers to achieving goals   Identified plan to support follow through on goals and reflection on progress made      Patient Participation / Response:  Fully participated with the group by sharing personal reflections / insights and openly received / provided feedback with other participants.    Verbalized understanding of content and Patient made progress towards meeting ITP goal number 5: aftercare plan    Treatment Plan:  Patient has a current master individualized treatment plan.  See Epic treatment plan for more information.    BETTIE Flores

## 2024-09-30 NOTE — GROUP NOTE
"Process Group Note    PATIENT'S NAME: Craig M Wilfahrt  MRN:   [id number]  :   1965  ACCT. NUMBER: [id number]  DATE OF SERVICE: 24  START TIME:  1:00 PM  END TIME:  1:50 PM  FACILITATOR: Roselyn Singh LICSW  TOPIC:  Process Group    Diagnoses:  DSM5 Diagnosis/es:      ICD-10-CM     1. MDD (major depressive disorder), recurrent episode, moderate (H)  F33.1        [facility]  TRACK: IOP/DT 3 55+    NUMBER OF PARTICIPANTS: 9        Data:    Session content: At the start of this group, patients were invited to check in by identifying themselves, describing their current emotional status, and identifying issues to address in this group.   Area(s) of treatment focus addressed in this session included Symptom Management, Personal Safety, and Community Resources/Discharge Planning.  Reported mood is \"pretty good\".  He enjoyed his weekend watching his son in law play football.  He reported some anxiety around winterizing his cabin.  He plans on asking his son for help.  He helped his son this morning with a work task.  He processed communication barriers with his wife.  Client denied safety concerns, including SI and SIB. Client reports having beer this weekend at the football game and on  while watching football.  Client is taking medications as prescribed. Peers offered supportive feedback and validation.    Therapeutic Interventions/Treatment Strategies:  Psychotherapist offered support, feedback and validation and reinforced use of skills. Treatment modalities used include Cognitive Behavioral Therapy and Dialectical Behavioral Therapy. Interventions include Behavioral Activation: Explored how behaviors effect mood and interact with thoughts and feelings and Encouraged strategies to reduce individual procrastination and increase motivation by increasing goal-directed activities to enhance mood and reduce symptoms..    Assessment:    Patient response: "   Patient responded to session by accepting feedback, giving feedback, listening, focusing on goals, being attentive, and accepting support    Possible barriers to participation / learning include: and no barriers identified    Health Issues:   None reported       Substance Use Review:   Substance Use: No active concerns identified.    Mental Status/Behavioral Observations  Appearance:   Appropriate   Eye Contact:   Good   Psychomotor Behavior: Normal   Attitude:   Cooperative  Interested Friendly Pleasant  Orientation:   All  Speech   Rate / Production: Normal    Volume:  Normal   Mood:    Anxious   Affect:    Appropriate   Thought Content:   Clear and Safety denies any current safety concerns including suicidal ideation, self-harm, and homicidal ideation  Thought Form:  Coherent  Logical     Insight:    Good     Plan:   Safety Plan: No current safety concerns identified.  Recommended that patient call 911 or go to the local ED should there be a change in any of these risk factors.   Barriers to treatment: None identified  Patient Contracts (see media tab):  None  Substance Use: Not addressed in session   Continue or Discharge: Patient will continue in 55+ Program (55+) as planned. Patient is likely to benefit from learning and using skills as they work toward the goals identified in their treatment plan.      Roselyn Singh, BETTIE  September 30, 2024

## 2024-09-30 NOTE — GROUP NOTE
Psychotherapy Group Note    PATIENT'S NAME: Craig M Wilfahrt  MRN:   [id number]  :   1965  ACCT. NUMBER: [id number]  DATE OF SERVICE: 24  START TIME:  3:00 PM  END TIME:  3:50 PM  FACILITATOR: Shannan Hays LICSW; Annabella Manjarrez RN  TOPIC: MH EBP Group: Coping Skills  [facility]  TRACK: IOP/DT 3    NUMBER OF PARTICIPANTS: 9    Summary of Group / Topics Discussed:  Coping Skills: Calming Strategies: Patients discussed and practiced strategies to increase sense of calm in the body.  Reviewed the benefits of calming strategies.  They developed the ability to practice by engaging in Progressive Muscle Relaxation (PMR)  to assist in sensory regulation and psychological well-being.    Patient Session Goals / Objectives:  Understand the purpose of using calming strategies to reduce stress  Review patients current calming practices and discuss a more formal way of practicing and accessing skills.  Increase ability to decide when to use various calming strategies (e.g. Progressive muscle relaxation, deep breathing).  Practice  calming strategies with PMR and deep breathing  to apply during times of distress.      Patient Participation / Response:  Fully participated with the group by sharing personal reflections / insights and openly received / provided feedback with other participants.    Demonstrated understanding of topics discussed through group discussion and participation    Treatment Plan:  Patient has a current master individualized treatment plan.  See Epic treatment plan for more information.    BETTIE Jasso

## 2024-09-30 NOTE — GROUP NOTE
Psychoeducation Group Note    PATIENT'S NAME: Craig M Wilfahrt  MRN:   [id number]  :   1965  ACCT. NUMBER: [id number]  DATE OF SERVICE: 24  START TIME:  3:00 PM  END TIME:  3:50 PM  FACILITATOR: Shannan Hays LICSW; Shaunna Harman OTR  TOPIC: MH Life Skills Group: Communication and Social Skills Development  [facility]  TRACK: IOP/DT 3    NUMBER OF PARTICIPANTS: 8    Summary of Group / Topics Discussed:  Communication and Social Skills Development: Communication and Social Skills Development: Patients were provided space and time to reflect on the importance of a support system and who their support system is. Patients discussed the need for different types of support: emotional, social, problem solving, physical assistance, and material assistance. Patients identified what areas they need extra support in and where they could find this support. Patients created a plan to expand their support system and shared with the group. Validation, support, and feedback were provided throughout group process.     Patient Session Goals / Objectives:   Self-reflected on individual support systems.   Identified specific life areas where they could improve their support system.   Established a plan to build support system to support their mental health recovery.   Practiced and reflected on how to generalize taught skills to their everyday life.       Patient Participation / Response:  Fully participated with the group by sharing personal reflections / insights and openly received / provided feedback with other participants.    Verbalized understanding of content    Treatment Plan:  Patient has a current master individualized treatment plan.  See Epic treatment plan for more information.    BETTIE Jasso

## 2024-10-02 ENCOUNTER — HOSPITAL ENCOUNTER (OUTPATIENT)
Dept: BEHAVIORAL HEALTH | Facility: CLINIC | Age: 59
Discharge: HOME | End: 2024-10-02
Attending: PSYCHIATRY & NEUROLOGY
Payer: COMMERCIAL

## 2024-10-02 PROCEDURE — 90853 GROUP PSYCHOTHERAPY: CPT

## 2024-10-02 PROCEDURE — 90853 GROUP PSYCHOTHERAPY: CPT | Performed by: SOCIAL WORKER

## 2024-10-02 NOTE — GROUP NOTE
Psychoeducation Group Note    PATIENT'S NAME: Craig M Wilfahrt  MRN:   [id number]  :   1965  ACCT. NUMBER: [id number]  DATE OF SERVICE: 10/02/24  START TIME:  1:00 PM  END TIME:  1:50 PM  FACILITATOR: Roselyn Singh LICSW  TOPIC: MH Life Skills Group: Communication and Social Skills Development  [facility] Outpatient Programs  TRACK: IOP/DT 3 55+    NUMBER OF PARTICIPANTS: 8    Summary of Group / Topics Discussed:  Provided education and assessment on patient's group programming goals. Evaluated patient's assessment of their ability to participate in groups, share emotions with group members, and openness to the group format. Provided education regarding appropriate individual-based group therapy goals.     Patient Session Goals and Objectives:  -Participate in reflective assessment of group expectations and norms.  -Understand appropriate topics and methods to discuss those topics in group programming.  -Identify and problem solve barriers to group participation.  -Identify strategies to actively cope while engaging in group programming.       Patient Participation / Response:  Fully participated with the group by sharing personal reflections / insights and openly received / provided feedback with other participants.    Verbalized understanding of content, Patient would benefit from additional opportunities to practice the content to be able to generalize it to their everyday life with increased intentionality, consistency, and efficacy in support of their psychiatric recovery, and Provided constructive feedback on group processing needs.    Treatment Plan:  Patient has a current master individualized treatment plan.  See Epic treatment plan for more information.    BETTIE Flores

## 2024-10-02 NOTE — GROUP NOTE
"Psychoeducation Group Note    PATIENT'S NAME: Craig M Wilfahrt  MRN:   [id number]  :   1965  ACCT. NUMBER: [id number]  DATE OF SERVICE: 10/02/24  START TIME:  3:00 PM  END TIME:  3:50 PM  FACILITATOR: Shannan Hays LICSW; Shaunna Harman OTR; Floyd Ferraro OTR  TOPIC:  Life Skills Group: Resiliency Development  [facility] Adult Mental Health Outpatient Programs  TRACK: IOP/DT 3    NUMBER OF PARTICIPANTS: 8    Summary of Group / Topics Discussed:  Resiliency Development:   Occupational Gifts: Patients were given the opportunity to reflect on and identify different types of occupations (activities) they participate in to maintain and/or build resilience in their lives.  Patients were taught about how \"doing\" promotes mental health recovery by providing routine and structure, empowerment, sense of self, and connectedness.  Patients identified occupations (activities) that promote mental health: connection, centering, creation, contemplation, and contribution.  Patients were also given the opportunity to improve self-efficacy, self-sufficiency, quality of life and a sense of mastery and competency by identifying and exploring positive activities they participate in their life. Validation, support, and feedback were provided from staff throughout group.     Patient Session Goals / Objectives:   Identified occupations (activities) they can use to promote mental health recovery and to effectively manage mental health symptoms .   Improved awareness of positive qualities of occupations they currently participate in and new occupations they might try and how this contributes to the process of recovery and mental health wellbeing and resiliency.   Established a plan for practice of these skills in their own environments.   Practiced and reflected on how to generalize taught skills to their everyday life.       Patient Participation / Response:  Fully participated with the group by sharing personal " reflections / insights and openly received / provided feedback with other participants.    Verbalized understanding of content    Treatment Plan:  Patient has a current master individualized treatment plan.  See Epic treatment plan for more information.    Shannan Hays, SONIYASW

## 2024-10-03 ENCOUNTER — HOSPITAL ENCOUNTER (OUTPATIENT)
Dept: BEHAVIORAL HEALTH | Facility: CLINIC | Age: 59
Discharge: HOME | End: 2024-10-03
Attending: PSYCHIATRY & NEUROLOGY
Payer: COMMERCIAL

## 2024-10-03 DIAGNOSIS — F33.1 MAJOR DEPRESSIVE DISORDER, RECURRENT EPISODE, MODERATE WITH ANXIOUS DISTRESS (H): Primary | ICD-10-CM

## 2024-10-03 PROCEDURE — 90853 GROUP PSYCHOTHERAPY: CPT

## 2024-10-03 PROCEDURE — 99214 OFFICE O/P EST MOD 30 MIN: CPT

## 2024-10-03 PROCEDURE — 90853 GROUP PSYCHOTHERAPY: CPT | Performed by: SOCIAL WORKER

## 2024-10-03 RX ORDER — DESVENLAFAXINE 50 MG/1
50 TABLET, FILM COATED, EXTENDED RELEASE ORAL DAILY
COMMUNITY
End: 2024-10-30

## 2024-10-03 NOTE — PROGRESS NOTES
"[facility]   Adult Mental Health Outpatient Programs  Psychiatric Progress Record    Program Track: IOP/DT 3 55+    PATIENT'S NAME: Craig M Wilfahrt  MRN:   [id number]  :   1965  ACCT. NUMBER: [id number]  DATE OF SERVICE: 10/03/24    Interval History:  \"ok.\" Lam presents today for follow-up and ongoing program supervision.   Endorses:  I get anxious and depressed once in the while  Most of the time I am feeling better  I feel group therapy is helping me  My outpatient provider prescribed Desvenlafaxine 2 weeks ago. I have not tried them yet  No suicide thoughts  Review GeneSight results    Review of Symptoms  Sleep: Is the same. I am getting 9-10 hours. I this more than what  I used. I feel rested when I wake up  Appetite: I eat my lunch earlier, 11-11:30. I have appetite.  Suicidal ideation: denies current or recent suicidal ideation or behavior  Thoughts of non-suicidal self-injury: denied  Recent self-injurious behavior: denied  Homicidal ideation: denied  Other safety concerns: denied    Activities of Daily Living and Related Systems Impacted by Illness:  Hygiene: about the same. No noted concerns  Socialization: trying   Activities of Daily Living: (cleaning, shopping, bills, etc.): a little difficult  Concerns related to work: retired    Substance use:  Denies    Medications:  Current Outpatient Medications   Medication Sig Dispense Refill    atorvastatin (LIPITOR) 20 MG tablet Take 20 mg by mouth daily      busPIRone (BUSPAR) 15 MG tablet Take 15 mg by mouth daily.      cariprazine (VRAYLAR) 3 MG capsule Take 3 mg by mouth daily.      desvenlafaxine (PRISTIQ) 50 MG 24 hr tablet Take 50 mg by mouth daily.      lisinopril (ZESTRIL) 10 MG tablet Take 20 mg by mouth daily.      venlafaxine (EFFEXOR-XR) 75 MG 24 hr capsule  (Patient not taking: Reported on 10/3/2024)         The above list was reviewed and updated in EPIC with patient today.     Patient is taking medications as prescribed and " "denies adverse effects    Laboratory Results:  Most recent labs reviewed. Pertinent updates/findings: None.     Mental Status Examination:  Vital Signs: There were no vitals taken for this visit.   Appearance: adequately groomed, appears stated age, and in no apparent distress.  Attitude: cooperative   Eye Contact: good   Muscle Strength and Tone: normal  Psychomotor Behavior: normal or unremarkable   Gait and Station: normal width, turn, arm swing  Speech: clear, coherent, decreased prosody, regular rate, regular rhythm, and fluent  Associations: No loosening of associations  Thought Process: coherent and goal directed  Thought Content: no evidence of suicidal ideation or homicidal ideation, no evidence of psychotic thought, no auditory hallucinations present, and no visual hallucinations present  Mood: \"anxious and depressed\"  Affect: mood congruent  Insight: good  Judgment: intact, adequate for safety  Impulse Control: intact  Oriented to: time, place, person, and situation  Attention Span and Concentration: Normal  Language: Intact  Recent and Remote Memory: Delayed & immediate recall intact  Fund of Knowledge/Assessment of Intelligence: Average  Capacity of Activities of Daily Living: Independent, able to participate in programmatic care services.    Diagnosis/es:    ICD-10-CM    1. Major depressive disorder, recurrent episode, moderate with anxious distress (H)  F33.1         Assessment/Plan:  Lam presents today for follow-up psychiatric evaluation and assessment of progress. He reports mood improvement but still experiences episodes of sadness and depression. His provider recommended switching from Venlafaxine to Desvenlafaxine, but he has not yet made the change. Today he agreed to initiate the cross-titration from 75 mg of Venlafaxine to 50 mg of Desvenlafaxine. He will also continue psychotherapy in the Intensive Outpatient Program to develop coping skills.   Follow up in 3 weeks or sooner as needed. "     Depression  Overall improved   Engage in psychotherapy  Continue with current medication regimen  STOP Venlafaxine 75 mg daily  START Desvenlafaxine 50 mg daily  Plan to increase to target depression  Vraylar 3 mg daily  Improve sleep hygiene  Maintain a balanced diet  Engage in physical activities as tolerated     Anxiety  Overall improved  As noted above  Buspirone 15 mg daily    Safety Assessment:  Lam reports suicidal ideation and/or non-suicidal self-injury or thoughts thereof as noted above  Lam is future-oriented and is engaged in treatment planning   I do not feel that Lam meets criteria for a 72-hour involuntary hold and remains appropriate for an outpatient level of care    LUISITO SHERIFF DNP on 10/3/2024 at 2:05 PM    Visit Details:  Type of service: In-person  Location (patient and provider): [facility] Adult Mental Health Outpatient Programmatic Care Offices    Level of Medical Decision Making:   - At least 1 chronic problem that is not stable  - Engaged in prescription drug management during visit (discussed any medication benefits, side effects, alternatives, etc.)  Discussion of management or test interpretation with external physician/other qualified healthcare professional/appropriate source - programmatic care multidisciplinary treatment team    30 min spent on the date of the encounter in chart review, patient visit, review of tests, documentation, care coordination, and/or discussion with other providers about the issues documented above.      This document completed in part using [employer] dictation software and therefore may contain inadvertent word or phrase substitutions.

## 2024-10-03 NOTE — GROUP NOTE
Psychotherapy Group Note    PATIENT'S NAME: Craig M Wilfahrt  MRN:   [id number]  :   1965  ACCT. NUMBER: [id number]  DATE OF SERVICE: 10/03/24  START TIME:  3:00 PM  END TIME:  3:50 PM  FACILITATOR: Shannan Hays LICSW; Paula Barajas RN  TOPIC: MH EBP Group: Self-Awareness  [facility] Outpatient Programs  TRACK: IOP/DT 3    NUMBER OF PARTICIPANTS: 7    Summary of Group / Topics Discussed:  Self-Awareness: Self-Awareness:  Self-esteem: Pts spent time journaling on self-esteem-based prompts independently. Patients were then asked to practice giving themselves compliments in the therapeutic space of group. Patients then engaged in a group game called  pick or pass,  in which a deck of cards is passed around with a positive quality/strength on each card. Each group member could chose to 'pick' the card if they personally identified with the quality or choose to 'pass' the card to a group member they feel exemplifies the quality listed on the card.      Patient Session Goals / Objectives:   -Practiced positive self-talk   -Challenged negative self-talk   -Practiced giving group members compliments and practiced receiving compliments from other group members.        Patient Participation / Response:  Fully participated with the group by sharing personal reflections / insights and openly received / provided feedback with other participants.    Demonstrated understanding of topics discussed through group discussion and participation, Demonstrated understanding of values, strengths, and challenges to learn about themselves, and Practiced skills in session    Treatment Plan:  Patient has a current master individualized treatment plan.  See Epic treatment plan for more information.    BETTIE Jasso

## 2024-10-03 NOTE — GROUP NOTE
Psychotherapy Group Note    PATIENT'S NAME: Craig M Wilfahrt  MRN:   [id number]  :   1965  ACCT. NUMBER: [id number]  DATE OF SERVICE: 10/03/24  START TIME:  2:00 PM  END TIME:  2:50 PM  FACILITATOR: Roselyn Singh LICSW  TOPIC: MH EBP Group: Self-Awareness  [facility] Mental Health Outpatient Programs  TRACK: IOP/DT 3 55+    NUMBER OF PARTICIPANTS: 8    Summary of Group / Topics Discussed:  Self-Awareness: Values: Patients identified personal values by examining development of their current values and how their values influence their daily functioning and life choices. Patients explored the impact of their values on their thoughts, feelings, and actions. Patients discussed definition of personal values and how they develop and change over time. The goal is to help patients reconcile value conflicts and achieve balance and flexibility to improve mood and daily functioning.     Patient Session Goals / Objectives:  Examined development of values and impact of values on functioning  Identified and prioritized important values related to current well-being   Identified strategies to change or enhance values to positively impact symptoms  Assisted patients to find ways to adapt functioning to better fit their values      Patient Participation / Response:  Fully participated with the group by sharing personal reflections / insights and openly received / provided feedback with other participants.    Demonstrated understanding of topics discussed through group discussion and participation, Demonstrated understanding of values, strengths, and challenges to learn about themselves, Identified / Expressed readiness to act intentionally, increase self-compassion, promote personal growth, Verbalized understanding of ways to proactively manage illness, Identified plan to address barriers to practicing skills that promote self-awareness , and Practiced skills in session    Treatment Plan:  Patient has a current  master individualized treatment plan.  See Epic treatment plan for more information.    BETTIE Flores

## 2024-10-03 NOTE — GROUP NOTE
"Process Group Note    PATIENT'S NAME: Craig M Wilfahrt  MRN:   [id number]  :   1965  ACCT. NUMBER: [id number]  DATE OF SERVICE: 10/02/24  START TIME:  2:00 PM  END TIME:  2:50 PM  FACILITATOR: Roselyn Singh LICSW  TOPIC:  Process Group    Diagnoses:  DSM5 Diagnosis/es:      ICD-10-CM     1. MDD (major depressive disorder), recurrent episode, moderate (H)  F33.1        [facility] Mental Health Outpatient Programs  TRACK: IOP/DT 3 55+    NUMBER OF PARTICIPANTS: 8        Data:    Session content: At the start of this group, patients were invited to check in by identifying themselves, describing their current emotional status, and identifying issues to address in this group.   Area(s) of treatment focus addressed in this session included Symptom Management, Personal Safety, and Community Resources/Discharge Planning.  Reported mood is \"overwhelmed\".  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 Client denied safety concerns, including SI and SIB. Client's goal is figure out dog sitting responsibilities.  Client reported plans to use the following coping skills: set healthy boundaries. He denied further processing time.    Therapeutic Interventions/Treatment Strategies:  Psychotherapist offered support, feedback and validation and reinforced use of skills. Treatment modalities used include Cognitive Behavioral Therapy and Dialectical Behavioral Therapy. Interventions include Relationship Skills: Assisted patients in implementing more effective communication skills in their relationships and Encouraged development and maintenance  of healthy boundaries.    Assessment:    Patient response:   Patient responded to session by accepting feedback, giving feedback, listening, focusing on goals, being attentive, and accepting support    Possible barriers to participation / learning include: and no barriers identified    Health Issues:   None " "reported       Substance Use Review:   Substance Use: No active concerns identified.    Mental Status/Behavioral Observations  Appearance:   Appropriate   Eye Contact:   Good   Psychomotor Behavior: Normal   Attitude:   Cooperative  Interested Friendly Pleasant  Orientation:   All  Speech   Rate / Production: Normal    Volume:  Normal   Mood:    \"Overwhelmed\"  Affect:    Appropriate   Thought Content:   Clear and Safety denies any current safety concerns including suicidal ideation, self-harm, and homicidal ideation  Thought Form:  Coherent  Logical     Insight:    Good     Plan:   Safety Plan: No current safety concerns identified.  Recommended that patient call 911 or go to the local ED should there be a change in any of these risk factors.   Barriers to treatment: None identified  Patient Contracts (see media tab):  None  Substance Use: Not addressed in session   Continue or Discharge: Patient will continue in 55+ Program (55+) as planned. Patient is likely to benefit from learning and using skills as they work toward the goals identified in their treatment plan.      Roselyn Singh, Health system  October 3, 2024  "

## 2024-10-04 ENCOUNTER — HOSPITAL ENCOUNTER (OUTPATIENT)
Dept: BEHAVIORAL HEALTH | Facility: CLINIC | Age: 59
Discharge: HOME | End: 2024-10-04
Attending: PSYCHIATRY & NEUROLOGY
Payer: COMMERCIAL

## 2024-10-04 PROCEDURE — 90853 GROUP PSYCHOTHERAPY: CPT | Performed by: SOCIAL WORKER

## 2024-10-04 PROCEDURE — 90853 GROUP PSYCHOTHERAPY: CPT

## 2024-10-04 NOTE — GROUP NOTE
"Process Group Note    PATIENT'S NAME: Craig M Wilfahrt  MRN:   [id number]  :   1965  ACCT. NUMBER: [id number]  DATE OF SERVICE: 10/04/24  START TIME:  1:00 PM  END TIME:  1:50 PM  FACILITATOR: Roselyn Singh LICSW  TOPIC:  Process Group    Diagnoses:  DSM5 Diagnosis/es:      ICD-10-CM     1. MDD (major depressive disorder), recurrent episode, moderate (H)  F33.1        [facility] Mental Health Outpatient Programs  TRACK: IOP/DT 3 55+    NUMBER OF PARTICIPANTS: 9        Data:    Session content: At the start of this group, patients were invited to check in by identifying themselves, describing their current emotional status, and identifying issues to address in this group.   Area(s) of treatment focus addressed in this session included Symptom Management, Personal Safety, and Community Resources/Discharge Planning.  Reported mood is \"tired, anxious\".  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  After prompting from writer, he affirmed that he enjoys helping his son with work tasks and does not have any boundary concerns. Client denied safety concerns, including SI and SIB. Client's goal is create a to do list for the weekend.  Barrier is avoidance behavior (e.g. laying on the couch). Client reported plans to use the following coping skills: \"organization\". He declined further processing time.    Therapeutic Interventions/Treatment Strategies:  Psychotherapist offered support, feedback and validation and reinforced use of skills. Treatment modalities used include Cognitive Behavioral Therapy and Dialectical Behavioral Therapy. Interventions include Behavioral Activation: Explored how behaviors effect mood and interact with thoughts and feelings and Encouraged strategies to reduce individual procrastination and increase motivation by increasing goal-directed activities to enhance mood and reduce symptoms..    Assessment:    Patient response:   Patient responded to " session by accepting feedback, giving feedback, listening, focusing on goals, being attentive, and accepting support    Possible barriers to participation / learning include: and no barriers identified    Health Issues:   None reported       Substance Use Review:   Substance Use: No active concerns identified.    Mental Status/Behavioral Observations  Appearance:   Appropriate   Eye Contact:   Good   Psychomotor Behavior: Normal   Attitude:   Cooperative  Interested Friendly Pleasant  Orientation:   All  Speech   Rate / Production: Normal    Volume:  Normal   Mood:    Anxious   Affect:    Appropriate   Thought Content:   Clear and Safety denies any current safety concerns including suicidal ideation, self-harm, and homicidal ideation  Thought Form:  Coherent  Logical     Insight:    Good     Plan:   Safety Plan: No current safety concerns identified.  Recommended that patient call 911 or go to the local ED should there be a change in any of these risk factors.   Barriers to treatment: None identified  Patient Contracts (see media tab):  None  Substance Use: Not addressed in session   Continue or Discharge: Patient will continue in 55+ Program (55+) as planned. Patient is likely to benefit from learning and using skills as they work toward the goals identified in their treatment plan.      Roselyn Singh, LICSW  October 4, 2024

## 2024-10-04 NOTE — GROUP NOTE
Psychotherapy Group Note    PATIENT'S NAME: Craig M Wilfahrt  MRN:   [id number]  :   1965  ACCT. NUMBER: [id number]  DATE OF SERVICE: 10/04/24  START TIME:  2:00 PM  END TIME:  2:50 PM  FACILITATOR: Roselyn Singh LICSW  TOPIC:  EBP Group: Self-Awareness  [facility] Mental Health Outpatient Programs  TRACK: IOP/DT 3 55+    NUMBER OF PARTICIPANTS: 9    Summary of Group / Topics Discussed:  Self-Awareness: Gratitude: Topic focused on assisting patients in identifying key concepts in gratitude. Patients discussed the benefits of practicing gratitude and its impact on mood improvement, mindfulness, and perspective. Patients worked to increase time spent on recognition and appreciation of what is positive and working in their lives. Patients discussed the concepts and benefits of feeling grateful. The goal is to reduce rumination and negative thinking resulting in increased mindfulness and resilience. Patients specifically discussed how they can practice and problem solve barriers to daily gratitude practice.     Patient Session Goals / Objectives:  [place] the concept and benefits of gratitude  Identified ways to practice gratitude in daily life  Problem solved barriers to practicing gratitude      Patient Participation / Response:  Fully participated with the group by sharing personal reflections / insights and openly received / provided feedback with other participants.    Demonstrated understanding of topics discussed through group discussion and participation, Demonstrated understanding of values, strengths, and challenges to learn about themselves, Identified / Expressed readiness to act intentionally, increase self-compassion, promote personal growth, Verbalized understanding of ways to proactively manage illness, Identified plan to address barriers to practicing skills that promote self-awareness , and Practiced skills in session    Treatment Plan:  Patient has a current master  individualized treatment plan.  See Epic treatment plan for more information.    SONIYA FloresSW

## 2024-10-04 NOTE — GROUP NOTE
"Process Group Note    PATIENT'S NAME: Craig M Wilfahrt  MRN:   [id number]  :   1965  ACCT. NUMBER: [id number]  DATE OF SERVICE: 10/03/24  START TIME:  1:00 PM  END TIME:  1:50 PM  FACILITATOR: Roselyn Singh LICSW  TOPIC:  Process Group    Diagnoses:  DSM5 Diagnosis/es:      ICD-10-CM     1. MDD (major depressive disorder), recurrent episode, moderate (H)  F33.1        [facility] Mental Health Outpatient Programs  TRACK: IOP/DT 3 55+    NUMBER OF PARTICIPANTS: 8        Data:    Session content: At the start of this group, patients were invited to check in by identifying themselves, describing their current emotional status, and identifying issues to address in this group.   Area(s) of treatment focus addressed in this session included Symptom Management, Personal Safety, and Community Resources/Discharge Planning.  Reported mood is \"pretty good\".  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  He was able to divide up responsibilities within his household, taking 2 of the 5 daily shifts.   Client denied safety concerns, including SI and SIB. Client denies any chemical use.  Client is taking medications as prescribed. Client's goal is \"balance so everyone does their share; not lose my temper\".  Client reported plans to use the following coping skills: \"assertiveness, more politeness\".  He denied further processing time.    Therapeutic Interventions/Treatment Strategies:  Psychotherapist offered support, feedback and validation and reinforced use of skills. Treatment modalities used include Cognitive Behavioral Therapy and Dialectical Behavioral Therapy. Interventions include Relationship Skills: Assisted patients in implementing more effective communication skills in their relationships.    Assessment:    Patient response:   Patient responded to session by accepting feedback, giving feedback, listening, focusing on goals, being attentive, and accepting " support    Possible barriers to participation / learning include: and no barriers identified    Health Issues:   None reported       Substance Use Review:   Substance Use: No active concerns identified.    Mental Status/Behavioral Observations  Appearance:   Appropriate   Eye Contact:   Good   Psychomotor Behavior: Normal   Attitude:   Cooperative  Interested Friendly Pleasant  Orientation:   All  Speech   Rate / Production: Normal    Volume:  Normal   Mood:    Frustrated  Affect:    Appropriate   Thought Content:   Clear and Safety denies any current safety concerns including suicidal ideation, self-harm, and homicidal ideation  Thought Form:  Coherent  Logical     Insight:    Good     Plan:   Safety Plan: No current safety concerns identified.  Recommended that patient call 911 or go to the local ED should there be a change in any of these risk factors.   Barriers to treatment: None identified  Patient Contracts (see media tab):  None  Substance Use: Not addressed in session   Continue or Discharge: Patient will continue in 55+ Program (55+) as planned. Patient is likely to benefit from learning and using skills as they work toward the goals identified in their treatment plan.      Roselyn Singh, [facility]  October 4, 2024

## 2024-10-04 NOTE — ADDENDUM NOTE
Encounter addended by: Nellie Vázquez DNP on: 10/4/2024 2:36 PM   Actions taken: Clinical Note Signed

## 2024-10-04 NOTE — GROUP NOTE
Psychoeducation Group Note    PATIENT'S NAME: Craig M Wilfahrt  MRN:   [id number]  :   1965  ACCT. NUMBER: [id number]  DATE OF SERVICE: 10/04/24  START TIME:  3:00 PM  END TIME:  3:50 PM  FACILITATOR: Shannan Hays LICSW; Floyd Ferraro OTR  TOPIC: MH Life Skills Group: Resiliency Development  [facility] Mental Health Outpatient Programs  TRACK: IOP 5    NUMBER OF PARTICIPANTS: 9    Summary of Group / Topics Discussed:  Resiliency Development:   Occupational Gifts Experiential: Patients were given the opportunity to engage in an activity that promotes resilience, connection, and empowerment throughout their daily life. Patients independently identified an activity that will contribute to their mental health recovery and promote self-efficacy and a sense of mastery. Patients discussed with peers the impact of the activity on their stress and anxiety levels. Validation, support, and guidance were provided throughout group.      Patient Session Goals / Objectives:   Established a plan for practice of these skills in their own environments.   Practiced and reflected on how to generalize taught skills to their everyday life.   Independently engage in an occupation that promotes mental health recovery.       Patient Participation / Response:  Fully participated with the group by sharing personal reflections / insights and openly received / provided feedback with other participants.    Verbalized understanding of content    Treatment Plan:  Patient has a current master individualized treatment plan.  See Epic treatment plan for more information.    BETTIE Jasso

## 2024-10-07 ENCOUNTER — TELEPHONE (OUTPATIENT)
Dept: BEHAVIORAL HEALTH | Facility: CLINIC | Age: 59
End: 2024-10-07
Payer: COMMERCIAL

## 2024-10-07 ENCOUNTER — HOSPITAL ENCOUNTER (OUTPATIENT)
Dept: BEHAVIORAL HEALTH | Facility: CLINIC | Age: 59
Discharge: HOME | End: 2024-10-07
Attending: PSYCHIATRY & NEUROLOGY
Payer: COMMERCIAL

## 2024-10-07 PROCEDURE — 90853 GROUP PSYCHOTHERAPY: CPT | Performed by: PSYCHOLOGIST

## 2024-10-07 PROCEDURE — 90853 GROUP PSYCHOTHERAPY: CPT

## 2024-10-07 NOTE — GROUP NOTE
Psychoeducation Group Note    PATIENT'S NAME: Craig M Wilfahrt  MRN:   [id number]  :   1965  ACCT. NUMBER: [id number]  DATE OF SERVICE: 10/07/24  START TIME:  3:00 PM  END TIME:  3:50 PM  FACILITATOR: Lenka Buenrostro Psy.D, LP; Annabella Manjarrez RN  TOPIC:  Wellness Group: [facility] Mental Health Outpatient Programs  TRACK: iop/dt 3     NUMBER OF PARTICIPANTS: 7    Summary of Group / Topics Discussed:  Foundations of Health: Sleep: Case study/sleep hygiene: Patients explored the connection between sleep and mental illness. Patients learned about how adequate sleep can improve health, productivity, wellness, quality of life, and safety.     Patient Session Goals / Objectives:  Demonstrated understanding of sleep hygiene practices and benefits of sleep  Identified sleep hygiene strategies to utilize   Described the connection between sleep disturbances and mental illness      Patient Participation / Response:  Fully participated with the group by sharing personal reflections / insights and openly received / provided feedback with other participants.    Verbalized understanding of foundations of health topic    Clients participated in a game about sleep and sleep disorders. Clients learned skills to manage insomnia and what to do to get back to sleep. Clients identified different ways to help them remain asleep and what to do if they could not get back to sleep.   Treatment Plan:  Patient has a current master individualized treatment plan.  See Epic treatment plan for more information.    Lenka Buenrostro Psy.D, [facility]

## 2024-10-07 NOTE — TELEPHONE ENCOUNTER
"Lam's wife called 55+ program line and was transferred to writer's extension to leave a voicemail.  Only EC SHANEL on file.  Lam's wife Alisa reported in detail presenting issues and observations in the home including, \"lying about how he uses time, sleeps all the time, procrastinates everything\".     Writer spoke with Lam briefly about his wife's outreach.  He will talk to wife before considering signing an SHANEL.  Writer encouraged patient to reflect more on symptoms and stressors in process group.    BETTIE Flores on 10/7/2024 at 4:23 PM    "

## 2024-10-07 NOTE — GROUP NOTE
"Process Group Note    PATIENT'S NAME: Craig M Wilfahrt  MRN:   [id number]  :   1965  ACCT. NUMBER: [id number]  DATE OF SERVICE: 10/07/24  START TIME:  1:00 PM  END TIME:  1:50 PM  FACILITATOR: Roselyn Singh LICSW  TOPIC:  Process Group    Diagnoses:  DSM5 Diagnosis/es:      ICD-10-CM     1. MDD (major depressive disorder), recurrent episode, moderate (H)  F33.1        [facility] Mental Health Outpatient Programs  TRACK: IOP/DT 3 55+    NUMBER OF PARTICIPANTS: 8        Data:    Session content: At the start of this group, patients were invited to check in by identifying themselves, describing their current emotional status, and identifying issues to address in this group.   Area(s) of treatment focus addressed in this session included Symptom Management, Personal Safety, and Community Resources/Discharge Planning.  Reported mood is depressed.  He reports \"I was glued to the TV most of the weekend, that bothered me\".  He wanted to complete more yard tasks this weekend, although he did complete 1/2 of the cabin tasks.   Client denied safety concerns, including SI and SIB. Client denies any chemical use.  Client is taking medications as prescribed. Client's goal is balance responsibilities with their dog's surgery.  Client reported plans to use the following coping skills: \"organization\", time management skills. He denied further processing time.    Therapeutic Interventions/Treatment Strategies:  Psychotherapist offered support, feedback and validation and reinforced use of skills. Treatment modalities used include Cognitive Behavioral Therapy and Dialectical Behavioral Therapy. Interventions include Behavioral Activation: Explored how behaviors effect mood and interact with thoughts and feelings and Encouraged strategies to reduce individual procrastination and increase motivation by increasing goal-directed activities to enhance mood and reduce symptoms. and Symptoms Management: Promoted " understanding of their diagnoses and how it impacts their functioning.    Assessment:    Patient response:   Patient responded to session by accepting feedback, giving feedback, listening, focusing on goals, being attentive, and accepting support    Possible barriers to participation / learning include: and no barriers identified    Health Issues:   None reported       Substance Use Review:   Substance Use: No active concerns identified.    Mental Status/Behavioral Observations  Appearance:   Appropriate   Eye Contact:   Good   Psychomotor Behavior: Normal   Attitude:   Cooperative  Interested Friendly Pleasant  Orientation:   All  Speech   Rate / Production: Normal    Volume:  Normal   Mood:    Depressed   Affect:    Appropriate   Thought Content:   Clear and Safety denies any current safety concerns including suicidal ideation, self-harm, and homicidal ideation  Thought Form:  Coherent  Logical     Insight:    Good     Plan:   Safety Plan: No current safety concerns identified.  Recommended that patient call 911 or go to the local ED should there be a change in any of these risk factors.   Barriers to treatment: None identified  Patient Contracts (see media tab):  None  Substance Use: Not addressed in session   Continue or Discharge: Patient will continue in 55+ Program (55+) as planned. Patient is likely to benefit from learning and using skills as they work toward the goals identified in their treatment plan.      Roselyn Singh, BETTIE  October 7, 2024

## 2024-10-07 NOTE — GROUP NOTE
Psychotherapy Group Note    PATIENT'S NAME: Craig M Wilfahrt  MRN:   [id number]  :   1965  ACCT. NUMBER: [id number]  DATE OF SERVICE: 10/07/24  START TIME:  2:00 PM  END TIME:  2:50 PM  FACILITATOR: Roselyn Singh LICSW  TOPIC: MH EBP Group: Coping Skills  [facility] Adult Mental Health Outpatient Programs  TRACK: IOP/DT 3 55+    NUMBER OF PARTICIPANTS: 8    Summary of Group / Topics Discussed:  Coping Skills: Additional Coping Skills:  Patients discussed and practiced Wise Mind.  Reviewed the benefits of applying the aforementioned coping strategies.  Patients explored how these strategies might be applied to daily stressors or distressing situations.    Patient Session Goals / Objectives:  Understand the purpose and benefits of applying Wise Mind coping strategies  Applied wise mind strategies to current problems and stressors  Defined the three mind states: emotional, rational and wise.  Address barriers to utilizing coping skills when in distress.      Patient Participation / Response:  Fully participated with the group by sharing personal reflections / insights and openly received / provided feedback with other participants.    Demonstrated understanding of topics discussed through group discussion and participation, Expressed understanding of the relevance / importance of coping skills at distressing times in life, Demonstrated knowledge of when to consider using a variety of coping skills in daily life, Identified barriers to applying coping skills, and Identified 2-3 positive coping strategies that have helped maintain / improve symptoms in the past    Treatment Plan:  Patient has a current master individualized treatment plan.  See Epic treatment plan for more information.    BETTIE Flores

## 2024-10-09 ENCOUNTER — HOSPITAL ENCOUNTER (OUTPATIENT)
Dept: BEHAVIORAL HEALTH | Facility: CLINIC | Age: 59
Discharge: HOME | End: 2024-10-09
Attending: PSYCHIATRY & NEUROLOGY
Payer: COMMERCIAL

## 2024-10-09 PROCEDURE — 90853 GROUP PSYCHOTHERAPY: CPT

## 2024-10-09 PROCEDURE — 90853 GROUP PSYCHOTHERAPY: CPT | Performed by: SOCIAL WORKER

## 2024-10-09 NOTE — GROUP NOTE
Psychotherapy Group Note    PATIENT'S NAME: Craig M Wilfahrt  MRN:   [id number]  :   1965  ACCT. NUMBER: [id number]  DATE OF SERVICE: 10/09/24  START TIME:  2:00 PM  END TIME:  2:50 PM  FACILITATOR: Roselyn Singh LICSW  TOPIC: MH EBP Group: Emotions Management  [facility] Mental Health Outpatient Programs  TRACK: IOP/DT 3 55+    NUMBER OF PARTICIPANTS: 8    Summary of Group / Topics Discussed:  Emotions Management: Guilt and Shame: Patients explored and shared personal experiences associated with feelings of guilt and shame.  Group explored how these feelings develop, what they mean to each individual, and how to increase acceptance and usefulness of these feelings.  Group members assisted one another to contextualize these concepts and promote healing.     Patient Session Goals / Objectives:  Discuss and review definitions and personal views/experiences with guilt and shame  Understand the differences between guilt and shame  Explore how feelings of guilt and shame impact functioning  Understand and practice strategies to manage difficult emotions and move towards healing  Understand and normalize difficult emotions through group discussion  Understand the utility of guilt and shame  Target  unwanted  emotions for change      Patient Participation / Response:  Fully participated with the group by sharing personal reflections / insights and openly received / provided feedback with other participants.    Demonstrated understanding of topics discussed through group discussion and participation, Expressed understanding of the relevance / importance of emotions management skills at distressing times in life, Self-aware of experiences with difficult emotions, and strategies to employ to manage them, Demonstrated knowledge of when to consider applying a variety of emotions management skills in daily life, Demonstrated understanding and practice strategies to manage difficult emotions and move towards  healing, Identified barriers to applying emotions management strategies, Identified strategies to overcome barriers to use of emotions management skills, and Identified emotions management strategies that have helped maintain / improve symptoms in the past    Treatment Plan:  Patient has a current master individualized treatment plan.  See Epic treatment plan for more information.    SONIYA FloresSW

## 2024-10-09 NOTE — GROUP NOTE
"Process Group Note    PATIENT'S NAME: Craig M Wilfahrt  MRN:   [id number]  :   1965  ACCT. NUMBER: [id number]  DATE OF SERVICE: 10/09/24  START TIME:  1:00 PM  END TIME:  1:50 PM  FACILITATOR: Roselyn Singh LICSW  TOPIC:  Process Group    Diagnoses:  DSM5 Diagnosis/es:      ICD-10-CM     1. MDD (major depressive disorder), recurrent episode, moderate (H)  F33.1        [facility] Mental Health Outpatient Programs  TRACK: IOP/DT 3 55+    NUMBER OF PARTICIPANTS: 8        Data:    Session content: At the start of this group, patients were invited to check in by identifying themselves, describing their current emotional status, and identifying issues to address in this group.   Area(s) of treatment focus addressed in this session included Symptom Management, Personal Safety, and Community Resources/Discharge Planning.  Reported mood is depressed, anxious but \"better today\". He reported spending most of his day on the couch watching television or sleeping.  He reported trying to read and eventually cutting the grass when prompted by daughter.   Client denied safety concerns, including SI and SIB. Client denies any chemical use.  Client is taking medications as prescribed. Client's goal is increasing daily structure. He used processing time to discuss barriers to his goals. Client reported plans to use the following coping skills: make a list of hobbies and activities (e.g. exercise, hunting, fixing motorcycle).  Writer encouraged small steps towards his goal, starting with the list. Client talked about another goal of doing 20 minutes of cardio.  Peers offered supportive feedback and validation.    Therapeutic Interventions/Treatment Strategies:  Psychotherapist offered support, feedback and validation and reinforced use of skills. Treatment modalities used include Cognitive Behavioral Therapy and Dialectical Behavioral Therapy. Interventions include Behavioral Activation: Explored how behaviors " effect mood and interact with thoughts and feelings and Encouraged strategies to reduce individual procrastination and increase motivation by increasing goal-directed activities to enhance mood and reduce symptoms..    Assessment:    Patient response:   Patient responded to session by accepting feedback, giving feedback, listening, focusing on goals, being attentive, and accepting support    Possible barriers to participation / learning include: and no barriers identified    Health Issues:   None reported       Substance Use Review:   Substance Use: No active concerns identified.    Mental Status/Behavioral Observations  Appearance:   Appropriate   Eye Contact:   Good   Psychomotor Behavior: Normal   Attitude:   Cooperative  Interested Friendly Pleasant  Orientation:   All  Speech   Rate / Production: Normal    Volume:  Normal   Mood:    Anxious  Depressed   Affect:    Appropriate   Thought Content:   Clear and Safety denies any current safety concerns including suicidal ideation, self-harm, and homicidal ideation  Thought Form:  Coherent  Logical     Insight:    Good     Plan:   Safety Plan: No current safety concerns identified.  Recommended that patient call 911 or go to the local ED should there be a change in any of these risk factors.   Barriers to treatment: None identified  Patient Contracts (see media tab):  None  Substance Use: Not addressed in session   Continue or Discharge: Patient will continue in 55+ Program (55+) as planned. Patient is likely to benefit from learning and using skills as they work toward the goals identified in their treatment plan.      Roselyn Singh, [facility]  October 9, 2024

## 2024-10-09 NOTE — GROUP NOTE
Psychoeducation Group Note    PATIENT'S NAME: Craig M Wilfahrt  MRN:   [id number]  :   1965  ACCT. NUMBER: [id number]  DATE OF SERVICE: 10/09/24  START TIME:  3:00 PM  END TIME:  3:50 PM  FACILITATOR: Shannan Hays LICSW; Floyd Ferraro OTR  TOPIC: MH Life Skills Group: Resiliency Development  [facility] Mental Health Outpatient Programs  TRACK: IOP/DT 3    NUMBER OF PARTICIPANTS: 8    Summary of Group / Topics Discussed:  Resiliency Development:  Self-Regulation Skills: Difficulty Day Planning: Patients were guided through therapeutic discussion focused on identifying how they feel physically and emotionally when they are having a difficult day and are in need of support. Psychoeducation was offered regarding the potential benefits of developing a plan to follow when they are feeling dysregulated to assist with decision making, self-regulation, and ensuring safety. Patients then developed and personalized a functional difficult day plan which identifies skills and tools that are helpful in the areas of: medications, sleep, nutrition, movement, support, leisure or self-care activities, and coping skills. Patients identified a place they can keep this document to easily access it as needed.     Patient Session Goals / Objectives:    Identified how they feel physically and emotionally when they are having a difficult day, and what daily activities may become difficult.   Identified personalized skills to utilize when appropriate and incorporated them into a functional plan to follow as needed.   Established a plan for accessing their plan in their own environments.       Patient Participation / Response:  Fully participated with the group by sharing personal reflections / insights and openly received / provided feedback with other participants.    Verbalized understanding of content    Treatment Plan:  Patient has a current master individualized treatment plan.  See Epic treatment plan for  more information.    Shannan Hays, LICSW

## 2024-10-10 ENCOUNTER — HOSPITAL ENCOUNTER (OUTPATIENT)
Dept: BEHAVIORAL HEALTH | Facility: CLINIC | Age: 59
Discharge: HOME | End: 2024-10-10
Attending: PSYCHIATRY & NEUROLOGY
Payer: COMMERCIAL

## 2024-10-10 PROCEDURE — 90853 GROUP PSYCHOTHERAPY: CPT | Performed by: SOCIAL WORKER

## 2024-10-10 PROCEDURE — 90853 GROUP PSYCHOTHERAPY: CPT

## 2024-10-10 NOTE — GROUP NOTE
Psychoeducation Group Note    PATIENT'S NAME: Craig M Wilfahrt  MRN:   [id number]  :   1965  ACCT. NUMBER: [id number]  DATE OF SERVICE: 10/10/24  START TIME:  3:00 PM  END TIME:  3:50 PM  FACILITATOR: Shannan Hays LICSW; Annabella Manjarrez RN  TOPIC:  Wellness Group: [facility] Mental Health Outpatient Programs  TRACK: IOP/DT 3    NUMBER OF PARTICIPANTS: 7    Summary of Group / Topics Discussed:  Foundations of Health: Sleep: sleep hygiene: Patients explored the connection between sleep and mental illness. Patients learned about how adequate sleep can improve health, productivity, wellness, quality of life, and safety.     Patient Session Goals / Objectives:  Demonstrated understanding of sleep hygiene practices and benefits of sleep  Identified sleep hygiene strategies to utilize   Described the connection between sleep disturbances and mental illness      Patient Participation / Response:  Fully participated with the group by sharing personal reflections / insights and openly received / provided feedback with other participants.    Demonstrated understanding of topics discussed through group discussion and participation and Verbalized understanding of foundations of health topic    Treatment Plan:  Patient has a current master individualized treatment plan.  See Epic treatment plan for more information.    BETTIE Jasso

## 2024-10-10 NOTE — GROUP NOTE
Psychotherapy Group Note    PATIENT'S NAME: Craig M Wilfahrt  MRN:   [id number]  :   1965  ACCT. NUMBER: [id number]  DATE OF SERVICE: 10/10/24  START TIME:  2:00 PM  END TIME:  2:50 PM  FACILITATOR: Roselyn Singh LICSW  TOPIC:  EBP Group: Self-Awareness  [facility] Mental Health Outpatient Programs  TRACK: IOP/DT 3 55+    NUMBER OF PARTICIPANTS: 7    Summary of Group / Topics Discussed:  Self-Awareness: Self-Compassion: Patients received overview of key concepts in developing self-compassion. Patients discussed mindfulness, self-kindness, and finding common humanity. Patients identified their current approach to problems in their lives and learned skills for increasing self-compassion. Patients identified ways they can put self-compassion skills into practice and problem solve barriers to application of skills.     Patient Session Goals / Objectives:  Iatan components of self-compassion  Identify ways to practice self-compassion in daily life  Problem solve barriers to self-compassion practice      Patient Participation / Response:  Fully participated with the group by sharing personal reflections / insights and openly received / provided feedback with other participants.    Demonstrated understanding of topics discussed through group discussion and participation, Demonstrated understanding of values, strengths, and challenges to learn about themselves, Identified / Expressed readiness to act intentionally, increase self-compassion, promote personal growth, Verbalized understanding of ways to proactively manage illness, and Identified plan to address barriers to practicing skills that promote self-awareness     Treatment Plan:  Patient has a current master individualized treatment plan.  See Epic treatment plan for more information.    BETTIE Flores

## 2024-10-10 NOTE — GROUP NOTE
Process Group Note    PATIENT'S NAME: Craig M Wilfahrt  MRN:   [id number]  :   1965  ACCT. NUMBER: [id number]  DATE OF SERVICE: 10/10/24  START TIME:  1:00 PM  END TIME:  1:50 PM  FACILITATOR: Roselyn Singh LICSW  TOPIC:  Process Group    Diagnoses:  DSM5 Diagnosis/es:      ICD-10-CM     1. MDD (major depressive disorder), recurrent episode, moderate (H)  F33.1        [facility]  TRACK: IOP/DT 3 55+    NUMBER OF PARTICIPANTS: 7        Data:    Session content: At the start of this group, patients were invited to check in by identifying themselves, describing their current emotional status, and identifying issues to address in this group.   Area(s) of treatment focus addressed in this session included Symptom Management, Personal Safety, and Community Resources/Discharge Planning.  Reported mood is anxious, depressed.  Main stressor: needing to take his car into the dealership after finding a nail in his tire.   Client denied safety concerns, including SI and SIB. Client denies any chemical use.  Client is taking medications as prescribed. Client's goal is keep a to-do list that includes exercise.  He set a goal to walk his dogs tomorrow and go on the treadmill before group tomorrow. Client is proud of himself for using a progressive muscle relaxation for sleep last night.   Peers offered supportive feedback and validation.    Therapeutic Interventions/Treatment Strategies:  Psychotherapist offered support, feedback and validation and reinforced use of skills. Treatment modalities used include Cognitive Behavioral Therapy and Dialectical Behavioral Therapy. Interventions include Behavioral Activation: Explored how behaviors effect mood and interact with thoughts and feelings and Encouraged strategies to reduce individual procrastination and increase motivation by increasing goal-directed activities to enhance mood and reduce symptoms. and Coping Skills:  Discussed meditation skills and addressed ways to implement meditation skills .    Assessment:    Patient response:   Patient responded to session by accepting feedback, giving feedback, listening, focusing on goals, being attentive, and accepting support    Possible barriers to participation / learning include: and no barriers identified    Health Issues:   None reported       Substance Use Review:   Substance Use: No active concerns identified.    Mental Status/Behavioral Observations  Appearance:   Appropriate   Eye Contact:   Good   Psychomotor Behavior: Normal   Attitude:   Cooperative  Interested Friendly Pleasant  Orientation:   All  Speech   Rate / Production: Normal    Volume:  Normal   Mood:    Anxious  Depressed   Affect:    Appropriate   Thought Content:   Clear and Safety denies any current safety concerns including suicidal ideation, self-harm, and homicidal ideation  Thought Form:  Coherent  Logical     Insight:    Good     Plan:   Safety Plan: No current safety concerns identified.  Recommended that patient call 911 or go to the local ED should there be a change in any of these risk factors.   Barriers to treatment: None identified  Patient Contracts (see media tab):  None  Substance Use: Not addressed in session   Continue or Discharge: Patient will continue in 55+ Program (55+) as planned. Patient is likely to benefit from learning and using skills as they work toward the goals identified in their treatment plan.      Roselyn Singh, BETTIE  October 10, 2024

## 2024-10-11 ENCOUNTER — HOSPITAL ENCOUNTER (OUTPATIENT)
Dept: BEHAVIORAL HEALTH | Facility: CLINIC | Age: 59
Discharge: HOME | End: 2024-10-11
Attending: PSYCHIATRY & NEUROLOGY
Payer: COMMERCIAL

## 2024-10-11 DIAGNOSIS — F33.1 MAJOR DEPRESSIVE DISORDER, RECURRENT EPISODE, MODERATE WITH ANXIOUS DISTRESS (H): Primary | ICD-10-CM

## 2024-10-11 PROCEDURE — 90853 GROUP PSYCHOTHERAPY: CPT | Performed by: SOCIAL WORKER

## 2024-10-11 PROCEDURE — 90853 GROUP PSYCHOTHERAPY: CPT

## 2024-10-11 NOTE — GROUP NOTE
Psychoeducation Group Note    PATIENT'S NAME: Craig M Wilfahrt  MRN:   [id number]  :   1965  ACCT. NUMBER: [id number]  DATE OF SERVICE: 10/11/24  START TIME:  3:00 PM  END TIME:  3:50 PM  FACILITATOR: Shannan Hays LICSW; Floyd Ferraro OTR  TOPIC: MH Life Skills Group: Lifestyle Balance and Structure  [facility]  TRACK: IOP/DT 3    NUMBER OF PARTICIPANTS: 6    Summary of Group / Topics Discussed:  Lifestyle Balance and Strucure:  : Self-care: Reviewed education on the framework of life balance that includes three intersecting circles (productivity, self-care, and leisure.)  Facilitated collaborative discussion about self-care including why it is important, barriers to engaging in it, and examples of current self-care practice. Brainstormed with the group to develop a list of self-care activities that meet the needs of one's mind, body, or spirit.  Educated on the importance of approaching self-care with self-compassion and jovana.  Discussed possible benefits of engaging in self-care including improved confidence, increased sense of structure and routine, and feeling rested.  Instructed group members to select one or two self-care activities they would like to explore in the coming weeks and provided an opportunity to brainstorm strategies for success.       Patient Session Goals / Objectives:   Reflected on self-care benefits and barriers through a group brainstorming process.    Identified areas of self-care that are a current priority and set realistic goals for these areas.   Identify strategies and skills to meet specific needs and address barriers.   Reflected on their current self-care engagement and shared insight about their progress.       Patient Participation / Response:  Fully participated with the group by sharing personal reflections / insights and openly received / provided feedback with other participants.    Verbalized understanding of  content    Treatment Plan:  Patient has a current master individualized treatment plan.  See Epic treatment plan for more information.    Shannan Hays, SONIYASW

## 2024-10-11 NOTE — GROUP NOTE
"Process Group Note    PATIENT'S NAME: Craig M Wilfahrt  MRN:   [id number]  :   1965  ACCT. NUMBER: [id number]  DATE OF SERVICE: 10/11/24  START TIME:  1:00 PM  END TIME:  1:50 PM  FACILITATOR: Roselyn Singh LICSW  TOPIC:  Process Group    Diagnoses:  DSM5 Diagnosis/es:      ICD-10-CM     1. MDD (major depressive disorder), recurrent episode, moderate (H)  F33.1        [facility]  TRACK: IOP/DT 3 55+    NUMBER OF PARTICIPANTS: 6        Data:    Session content: At the start of this group, patients were invited to check in by identifying themselves, describing their current emotional status, and identifying issues to address in this group.   Area(s) of treatment focus addressed in this session included Symptom Management, Personal Safety, and Community Resources/Discharge Planning.  Reported mood is \"rested\".  He reports interrupted sleep last night, eventually falling back asleep.  He completed his exercise goal of using the treadmill.  He discounted this goal, stating he should have run instead of walk. He created an exercise plan/list to use.  He plans on using self-compassion to motivate himself.  He also reflected on goals to go hunting and ride motorcycle this weekend.  Client denied safety concerns, including SI and SIB. Client denies any chemical use.  Client is taking medications as prescribed.  Client is grateful for his son and son's friends for helping close the cabin this weekend. Peers offered supportive feedback and validation.    Therapeutic Interventions/Treatment Strategies:  Psychotherapist offered support, feedback and validation and reinforced use of skills. Treatment modalities used include Cognitive Behavioral Therapy and Dialectical Behavioral Therapy. Interventions include Behavioral Activation: Explored how behaviors effect mood and interact with thoughts and feelings and Encouraged strategies to reduce individual procrastination and " increase motivation by increasing goal-directed activities to enhance mood and reduce symptoms., Cognitive Restructuring:  Explored impact of ineffective thoughts / distortions on mood and activity and Assisted patient in formulating new neutral/positive alternatives to challenge less helpful / ineffective thoughts, and Coping Skills: Assisted patient in understanding the purpose of planning / creating / participating / sharing in positive experiences.    Assessment:    Patient response:   Patient responded to session by accepting feedback, giving feedback, listening, focusing on goals, being attentive, and accepting support    Possible barriers to participation / learning include: and no barriers identified    Health Issues:   None reported       Substance Use Review:   Substance Use: No active concerns identified.    Mental Status/Behavioral Observations  Appearance:   Appropriate   Eye Contact:   Good   Psychomotor Behavior: Normal   Attitude:   Cooperative  Interested Friendly Pleasant  Orientation:   All  Speech   Rate / Production: Normal    Volume:  Normal   Mood:    Anxious  Depressed   Affect:    Appropriate   Thought Content:   Clear and Safety denies any current safety concerns including suicidal ideation, self-harm, and homicidal ideation  Thought Form:  Coherent  Logical     Insight:    Good     Plan:   Safety Plan: No current safety concerns identified.  Recommended that patient call 911 or go to the local ED should there be a change in any of these risk factors.   Barriers to treatment: None identified  Patient Contracts (see media tab):  None  Substance Use: Not addressed in session   Continue or Discharge: Patient will continue in 55+ Program (55+) as planned. Patient is likely to benefit from learning and using skills as they work toward the goals identified in their treatment plan.      Roselyn Singh, [facility]  October 11, 2024

## 2024-10-11 NOTE — TELEPHONE ENCOUNTER
Needs refill of Vraylar and outpatient prescriber is not responding per patient's report. Will route refill to Nellie Vázquez for sign off.

## 2024-10-11 NOTE — GROUP NOTE
Psychotherapy Group Note    PATIENT'S NAME: Craig M Wilfahrt  MRN:   [id number]  :   1965  ACCT. NUMBER: [id number]  DATE OF SERVICE: 10/11/24  START TIME:  2:00 PM  END TIME:  2:50 PM  FACILITATOR: Roselyn Singh LICSW  TOPIC: MH EBP Group: Coping Skills  [facility]  TRACK: IOP/DT 3 55+    NUMBER OF PARTICIPANTS: 6    Summary of Group / Topics Discussed:  Coping Skills: Building Positive Experiences: Patients discussed the importance of planning and engaging in positive experiences, as strategies to increase positive thinking, hope, and self-worth.  Explored the benefits of planning / creating positive experiences, including recognizing and reducing negativity bias by focusing on and building positive experiences.   Several approaches to building positive experiences were presented and discussed relevant to each patient.      Patient Session Goals / Objectives:  Understand the purpose of planning / creating / participating / sharing in positive experiences.  Explore patient s experiences related to negative thinking and how it influences activities and moodIdentify current positive events in patient s life.   Set goals to increase a variety of positive experiences.  Address barriers to planning / engaging in positive experiences.      Patient Participation / Response:  Fully participated with the group by sharing personal reflections / insights and openly received / provided feedback with other participants.    Demonstrated understanding of topics discussed through group discussion and participation, Expressed understanding of the relevance / importance of coping skills at distressing times in life, Demonstrated knowledge of when to consider using a variety of coping skills in daily life, Identified barriers to applying coping skills, Identified / Expressed personal readiness to practice new coping skills, and Committed to using the following techniques in times  of distress: building positive experiences    Treatment Plan:  Patient has a current master individualized treatment plan.  See Epic treatment plan for more information.    BETTIE Flores

## 2024-10-14 ENCOUNTER — HOSPITAL ENCOUNTER (OUTPATIENT)
Dept: BEHAVIORAL HEALTH | Facility: CLINIC | Age: 59
Discharge: HOME | End: 2024-10-14
Attending: PSYCHIATRY & NEUROLOGY
Payer: COMMERCIAL

## 2024-10-14 PROCEDURE — 90853 GROUP PSYCHOTHERAPY: CPT | Performed by: SOCIAL WORKER

## 2024-10-14 PROCEDURE — 90853 GROUP PSYCHOTHERAPY: CPT

## 2024-10-14 NOTE — GROUP NOTE
Psychoeducation Group Note    PATIENT'S NAME: Craig M Wilfahrt  MRN:   [id number]  :   1965  ACCT. NUMBER: [id number]  DATE OF SERVICE: 10/14/24  START TIME:  3:00 PM  END TIME:  3:50 PM  FACILITATOR: Shannan Hays LICSW; Annabella Manjarrez RN  TOPIC:  Wellness Group: Medication Education and Management  [facility]  TRACK: IOP/DT 3    NUMBER OF PARTICIPANTS: 7    Summary of Group / Topics Discussed:  Medication Educations and Management:  Medication Jeopardy: Patients provided education regarding medication safety, antidepressants, side effects, neuroleptics, expected medication outcomes, knowledge of diagnosis, symptoms, and symptom management through an engaging jeopardy-style format.     Patient Session Goals / Objectives:    Participated in team-based Jeopardy game  Identified strategies for safe use, handling, and disposal of medications  Discussed basic aspects of medication safety, side effects, adverse outcomes and contraindications      Patient Participation / Response:  Fully participated with the group by sharing personal reflections / insights and openly received / provided feedback with other participants.     Demonstrated understanding of topics discussed through group discussion and participation and Verbalized understanding of medication education and management topic    Treatment Plan:  Patient has a current master individualized treatment plan.  See Epic treatment plan for more information.    BETTIE Jasso

## 2024-10-14 NOTE — GROUP NOTE
Psychoeducation Group Note    PATIENT'S NAME: Craig M Wilfahrt  MRN:   [id number]  :   1965  ACCT. NUMBER: [id number]  DATE OF SERVICE: 10/14/24  START TIME:  2:00 PM  END TIME:  2:50 PM  FACILITATOR: Roselyn Singh LICSW  TOPIC: MH Life Skills Group: Lifestyle Balance and Structure  [facility]  TRACK: IOP/DT 3 55+    NUMBER OF PARTICIPANTS: 7    Summary of Group / Topics Discussed:  Lifestyle Balance and Strucure:  Goal-setting & integration: Patients were introduced to the process and benefits of setting realistic, timely, and achievable goals to help support their ability to follow through with meaningful personal, health, recovery and treatment goals that they would like to achieve to improve overall functioning.  Patients were also taught and then practiced techniques to manage a variety of obstacles to achieve their goals and how to break goals into manageable pieces.  Patients also reported on follow through of goals.     Patient Session Goals / Objectives:  Facilitated the creation of a vision for recovery and wellbeing  Identified and wrote meaningful SMART goals to engage in meaningful activities of daily living   Identified and problem solved barriers to achieving goals   Identified plan to support follow through on goals and reflection on progress made      Patient Participation / Response:  Fully participated with the group by sharing personal reflections / insights and openly received / provided feedback with other participants.    Verbalized understanding of content and Patient made progress towards meeting ITP goal number 1&2    Treatment Plan:  Patient has a current master individualized treatment plan.  See Epic treatment plan for more information.    BETTIE Flores

## 2024-10-14 NOTE — GROUP NOTE
"Process Group Note    PATIENT'S NAME: Craig M Wilfahrt  MRN:   [id number]  :   1965  ACCT. NUMBER: [id number]  DATE OF SERVICE: 10/14/24  START TIME:  1:00 PM  END TIME:  1:50 PM  FACILITATOR: Roselyn Singh LICSW  TOPIC:  Process Group    Diagnoses:  DSM5 Diagnosis/es:      ICD-10-CM     1. MDD (major depressive disorder), recurrent episode, moderate (H)  F33.1        [facility]  TRACK: IOP/DT 3 55+    NUMBER OF PARTICIPANTS: 7        Data:    Session content: At the start of this group, patients were invited to check in by identifying themselves, describing their current emotional status, and identifying issues to address in this group.   Area(s) of treatment focus addressed in this session included Symptom Management, Personal Safety, and Community Resources/Discharge Planning.  Reported mood is \"relieved\".  He is grateful for his son and his friends taking down the cabin dock this weekend.  He reported wind as the main barrier to engaging in his hobbies this weekend (hunting and motorcycle). He was able to exercise this weekend.  Client denied safety concerns, including SI and SIB. Client denies any chemical use.  Peers offered supportive feedback and validation.    Therapeutic Interventions/Treatment Strategies:  Psychotherapist offered support, feedback and validation and reinforced use of skills. Treatment modalities used include Cognitive Behavioral Therapy and Dialectical Behavioral Therapy. Interventions include Behavioral Activation: Explored how behaviors effect mood and interact with thoughts and feelings and Encouraged strategies to reduce individual procrastination and increase motivation by increasing goal-directed activities to enhance mood and reduce symptoms..    Assessment:    Patient response:   Patient responded to session by accepting feedback, giving feedback, listening, focusing on goals, being attentive, and accepting " "support    Possible barriers to participation / learning include: and no barriers identified    Health Issues:   None reported       Substance Use Review:   Substance Use: No active concerns identified.    Mental Status/Behavioral Observations  Appearance:   Appropriate   Eye Contact:   Good   Psychomotor Behavior: Normal   Attitude:   Cooperative  Interested Friendly Pleasant  Orientation:   All  Speech   Rate / Production: Normal/ Responsive Normal    Volume:  Normal   Mood:    Depressed  \"Relieved:  Affect:    Appropriate   Thought Content:   Clear and Safety denies any current safety concerns including suicidal ideation, self-harm, and homicidal ideation  Thought Form:  Coherent  Logical     Insight:    Good     Plan:   Safety Plan: No current safety concerns identified.  Recommended that patient call 911 or go to the local ED should there be a change in any of these risk factors.   Barriers to treatment: None identified  Patient Contracts (see media tab):  None  Substance Use: Not addressed in session   Continue or Discharge: Patient will continue in 55+ Program (55+) as planned. Patient is likely to benefit from learning and using skills as they work toward the goals identified in their treatment plan.      Roselyn Singh, [facility]  October 14, 2024  "

## 2024-10-16 ENCOUNTER — HOSPITAL ENCOUNTER (OUTPATIENT)
Dept: BEHAVIORAL HEALTH | Facility: CLINIC | Age: 59
Discharge: HOME | End: 2024-10-16
Attending: PSYCHIATRY & NEUROLOGY
Payer: COMMERCIAL

## 2024-10-16 PROCEDURE — 90853 GROUP PSYCHOTHERAPY: CPT

## 2024-10-16 PROCEDURE — 90853 GROUP PSYCHOTHERAPY: CPT | Performed by: SOCIAL WORKER

## 2024-10-16 NOTE — GROUP NOTE
"Process Group Note    PATIENT'S NAME: Craig M Wilfahrt  MRN:   [id number]  :   1965  ACCT. NUMBER: [id number]  DATE OF SERVICE: 10/16/24  START TIME:  1:00 PM  END TIME:  1:50 PM  FACILITATOR: Roselyn Singh LICSW  TOPIC:  Process Group    Diagnoses:  DSM5 Diagnosis/es:      ICD-10-CM     1. MDD (major depressive disorder), recurrent episode, moderate (H)  F33.1        [facility]  TRACK: IOP/DT 3 55+    NUMBER OF PARTICIPANTS: 8        Data:    Session content: At the start of this group, patients were invited to check in by identifying themselves, describing their current emotional status, and identifying issues to address in this group.   Area(s) of treatment focus addressed in this session included Symptom Management, Personal Safety, and Community Resources/Discharge Planning.  Reported mood is \"pretty good\".  He continues to accomplish his exercise goals.  Client denied safety concerns, including SI and SIB. Client denies any chemical use.  Client is taking medications as prescribed. Client's goal is \"exercise, stay positive\".  Client reported plans to use the following coping skills: 5 senses grounding strategies. Client talked about celebrating his 25th wedding anniversary today. He is in the process of selling one of his cars. Client is grateful for running into an old friend at the grocery store today. Peers offered supportive feedback and validation.    Therapeutic Interventions/Treatment Strategies:  Psychotherapist offered support, feedback and validation and reinforced use of skills. Treatment modalities used include Cognitive Behavioral Therapy and Dialectical Behavioral Therapy. Interventions include Behavioral Activation: Explored how behaviors effect mood and interact with thoughts and feelings and Encouraged strategies to reduce individual procrastination and increase motivation by increasing goal-directed activities to enhance mood and reduce " symptoms. and Coping Skills: Promoted understanding of how and when to apply grounding strategies to reduce distress and increase presence in the moment.    Assessment:    Patient response:   Patient responded to session by accepting feedback, giving feedback, listening, focusing on goals, being attentive, and accepting support    Possible barriers to participation / learning include: and no barriers identified    Health Issues:   None reported       Substance Use Review:   Substance Use: No active concerns identified.    Mental Status/Behavioral Observations  Appearance:   Appropriate   Eye Contact:   Good   Psychomotor Behavior: Normal   Attitude:   Cooperative  Interested Friendly Pleasant  Orientation:   All  Speech   Rate / Production: Normal    Volume:  Normal   Mood:    Anxious   Affect:    Appropriate   Thought Content:   Clear and Safety denies any current safety concerns including suicidal ideation, self-harm, and homicidal ideation  Thought Form:  Coherent  Logical     Insight:    Good     Plan:   Safety Plan: No current safety concerns identified.  Recommended that patient call 911 or go to the local ED should there be a change in any of these risk factors.   Barriers to treatment: None identified  Patient Contracts (see media tab):  None  Substance Use: Not addressed in session   Continue or Discharge: Patient will continue in 55+ Program (55+) as planned. Patient is likely to benefit from learning and using skills as they work toward the goals identified in their treatment plan.      Roselyn Singh, SONIYASW  October 16, 2024

## 2024-10-16 NOTE — GROUP NOTE
Psychotherapy Group Note    PATIENT'S NAME: Craig M Wilfahrt  MRN:   [id number]  :   1965  ACCT. NUMBER: [id number]  DATE OF SERVICE: 10/16/24  START TIME:  2:00 PM  END TIME:  2:50 PM  FACILITATOR: Roselyn Singh LICSW  TOPIC:  EBP Group: Symptom Awareness  [facility]  TRACK: IOP/DT 3 55+    NUMBER OF PARTICIPANTS: 8    Summary of Group / Topics Discussed:  Symptom Awareness: Social Anxiety: Patients received a general overview of social anxiety and how it relates to their current symptoms. The purpose is to promote understanding of their diagnoses and how it impacts their functioning. Patients reviewed their current awareness of symptoms and diagnoses. Patients received information regarding diagnoses, etiology, cultural, and environmental factors as well as impact on functioning.     Patient Session Goals / Objectives:  Discussed patient individual symptoms and experiences  Reviewed diagnostic criteria and etiology of diagnoses   Discussed coping strategies to manage social anxiety while increasing socialization      Patient Participation / Response:  Fully participated with the group by sharing personal reflections / insights and openly received / provided feedback with other participants.    Demonstrated understanding of topics discussed through group discussion and participation, Demonstrated understanding of how information regarding symptoms can assist in management of symptoms, Identified / Expressed personal readiness to increase awareness of symptoms and apply skills as necessary, and Verbalized understanding of how awareness can benefit mental health symptoms     Treatment Plan:  Patient has a current master individualized treatment plan.  See Epic treatment plan for more information.    BETTIE Flores

## 2024-10-16 NOTE — GROUP NOTE
Psychoeducation Group Note    PATIENT'S NAME: Craig M Wilfahrt  MRN:   [id number]  :   1965  ACCT. NUMBER: [id number]  DATE OF SERVICE: 10/16/24  START TIME:  3:00 PM  END TIME:  3:50 PM  FACILITATOR: Shannan Hays LICSW; Shaunna Harman OTR; Floyd Ferraro OTR  TOPIC: MH Life Skills Group: Lifestyle Balance and Structure  [facility] Out[facility]  TRACK: IOP/DT 3    NUMBER OF PARTICIPANTS: 7    Summary of Group / Topics Discussed:  Lifestyle Balance and Strucure:  Benefits of Leisure on Mental Health: Patients explored and learned about the benefits and possibilities of leisure activity to create lifestyle balance that supports their mental and physical wellbeing.  Patients were assisted to identify individualized leisure values and interests, recognized the benefits of leisure activity on mental health, and problem solved barriers to leisure engagement and strategies to overcome.  Patient engaged in an experiential leisure activity to gain self-awareness and build milieu social aspects and reflected on the impact the experiential activity had on their mood.       Patient Session Goals / Objectives:  Increased awareness of the importance of engagement in leisure activities to support lifestyle balance and perceived quality of life  Identified strategies to recognize and challenge barriers to leisure participation   Facilitated exploration of meaningful leisure interests and values  Practiced and reflected on how to generalize taught skills to their everyday life      Patient Participation / Response:  Fully participated with the group by sharing personal reflections / insights and openly received / provided feedback with other participants.    Verbalized understanding of content    Treatment Plan:  Patient has a current master individualized treatment plan.  See Epic treatment plan for more information.    BETTIE Jasso

## 2024-10-17 ENCOUNTER — HOSPITAL ENCOUNTER (OUTPATIENT)
Dept: BEHAVIORAL HEALTH | Facility: CLINIC | Age: 59
Discharge: HOME | End: 2024-10-17
Attending: PSYCHIATRY & NEUROLOGY
Payer: COMMERCIAL

## 2024-10-17 PROCEDURE — 90853 GROUP PSYCHOTHERAPY: CPT | Performed by: COUNSELOR

## 2024-10-17 PROCEDURE — 90853 GROUP PSYCHOTHERAPY: CPT | Performed by: SOCIAL WORKER

## 2024-10-17 PROCEDURE — 90853 GROUP PSYCHOTHERAPY: CPT

## 2024-10-17 NOTE — GROUP NOTE
Psychoeducation Group Note    PATIENT'S NAME: Craig M Wilfahrt  MRN:   [id number]  :   1965  ACCT. NUMBER: [id number]  DATE OF SERVICE: 10/17/24  START TIME:  3:00 PM  END TIME:  3:50 PM  FACILITATOR: Shannan Hays LICSW; Annabella Manjarrez RN  TOPIC:  Wellness Group: Mental Health Maintenance  [facility] Outpatient Programs  TRACK: IOP/DT 3    NUMBER OF PARTICIPANTS: 7    Relationship with diagnosis/stigma     Summary of Group / Topics Discussed:   Mental Health Maintenance: Relationship with diagnosis and Stigma: In this group, patients were given the space to explore the trajectory of their relationship with their diagnosis/es over time. The group discussed the way stigma impacts their own life, their access to treatment, and the relationship they have with their support network. The relationship between physical and mental health was also explored in the context of healthcare access, treatment, and support. Patients explored ways to combat stigma surrounding mental illness.      Patient Session Goals / Objectives:    Patients identified the importance healthy, safe expression of emotion    Patients discussed how receiving education on mental health diagnoses, symptoms, and treatments can help to keep personal stigma at bay and advocate for themselves               Patient Participation / Response:  Fully participated with the group by sharing personal reflections / insights and openly received / provided feedback with other participants.    Demonstrated understanding of topics discussed through group discussion and participation and Verbalized understanding of mental health maintenance topic    Treatment Plan:  Patient has a current master individualized treatment plan.  See Epic treatment plan for more information.    BETTIE Jasso

## 2024-10-17 NOTE — GROUP NOTE
Psychotherapy Group Note    PATIENT'S NAME: Craig M Wilfahrt  MRN:   [id number]  :   1965  ACCT. NUMBER: [id number]  DATE OF SERVICE: 10/17/24  START TIME:  2:00 PM  END TIME:  2:50 PM  FACILITATOR: Bj Silva LMFT  TOPIC: MH EBP Group: Relationship Skills  [facility]  TRACK: IOPDT3    NUMBER OF PARTICIPANTS: 7    Summary of Group / Topics Discussed:  Relationship Skills: Assertive Communication: Patients were provided with a general overview of assertive communication skills and how practicing assertive communication skills will assist patients in developing healthier and more effective relationships. Patients reviewed their current awareness on ability to practice assertive communication, ways to increase assertive communication, and identified/problem solved barriers to assertive communication.     Patient Session Goals / Objectives:  Identified and discussed patient individual challenges with communication  Presented and practiced effective communication skills in session  Assisted patients in implementing more effective communication skills in their relationships      Patient Participation / Response:  Fully participated with the group by sharing personal reflections / insights and openly received / provided feedback with other participants.    Demonstrated understanding of topics discussed through group discussion and participation and Practiced skills in session    Treatment Plan:  Patient has a current master individualized treatment plan.  See Epic treatment plan for more information.    KENNA Clay

## 2024-10-17 NOTE — GROUP NOTE
Process Group Note    PATIENT'S NAME: Craig M Wilfahrt  MRN:   [id number]  :   1965  ACCT. NUMBER: [id number]  DATE OF SERVICE: 10/17/24  START TIME:  1:00 PM  END TIME:  1:50 PM  FACILITATOR: Cha Ware LGSW; Hamida Perez [facility]  TOPIC:  Process Group    Diagnoses:  MDD (major depressive disorder), recurrent episode, moderate (H)  F33.1       [facility] Outpatient [facility]  TRACK:IOP/DT 3 55+    NUMBER OF PARTICIPANTS: 7          Data:    Session content: At the start of this group, patients were invited to check in by identifying themselves, describing their current emotional status, and identifying issues to address in this group.   Area(s) of treatment focus addressed in this session included Symptom Management and Personal Safety.    Patient reported feeling okay. Patient discussed working toward tasks to complete at home. Patient identified staying organized, time management and buying the supplies he needs as skills they will use to address their goal(s). Patient reported no barrier to working toward their goal(s) and/or addressing mental health symptoms. Patient reported no safety concerns and/or self-injurious behaviors. Patient reported no substance use. Patient reported they are taking their medications as prescribed. Patient reported feeling proud that they can accomplish theses tasks for his wife and daughter. Patient discussed time management with the treatment group.     Therapeutic Interventions/Treatment Strategies:  Psychotherapist offered support, feedback and validation, set limits, and reinforced use of skills. Treatment modalities used include Motivational Interviewing, Cognitive Behavioral Therapy, and Dialectical Behavioral Therapy.    Assessment:    Patient response:   Patient responded to session by accepting feedback, giving feedback, listening, focusing on goals, being attentive, and accepting support    Possible barriers to participation  / learning include: and no barriers identified    Health Issues:   None reported       Substance Use Review:   Substance Use: No active concerns identified.    Mental Status/Behavioral Observations  Appearance:   Appropriate   Eye Contact:   Good   Psychomotor Behavior: Normal   Attitude:   Cooperative   Orientation:   All  Speech   Rate / Production: Normal    Volume:  Normal   Mood:    Normal  Affect:    Appropriate   Thought Content:   Clear  Thought Form:  Coherent  Logical     Insight:    Good     Plan:   Safety Plan: Recommended that patient call 911 or go to the local ED should there be a change in any of these risk factors.   Barriers to treatment: None identified  Patient Contracts (see media tab):  None  Substance Use: Not addressed in session   Continue or Discharge: Patient will continue in 55+ Program (55+) as planned. Patient is likely to benefit from learning and using skills as they work toward the goals identified in their treatment plan.      Cha Ware, EDILBERTO  October 17, 2024

## 2024-10-18 ENCOUNTER — HOSPITAL ENCOUNTER (OUTPATIENT)
Dept: BEHAVIORAL HEALTH | Facility: CLINIC | Age: 59
Discharge: HOME | End: 2024-10-18
Attending: PSYCHIATRY & NEUROLOGY
Payer: COMMERCIAL

## 2024-10-18 PROCEDURE — 90853 GROUP PSYCHOTHERAPY: CPT

## 2024-10-18 PROCEDURE — 90853 GROUP PSYCHOTHERAPY: CPT | Performed by: SOCIAL WORKER

## 2024-10-18 NOTE — GROUP NOTE
Psychotherapy Group Note    PATIENT'S NAME: Craig M Wilfahrt  MRN:   [id number]  :   1965  ACCT. NUMBER: [id number]  DATE OF SERVICE: 10/18/24  START TIME:  2:00 PM  END TIME:  2:50 PM  FACILITATOR: Shannan Hays LICSW  TOPIC: MH EBP Group: Coping Skills  [facility] Outpatient [facility]  TRACK: IOP/DT 3    NUMBER OF PARTICIPANTS: 6    Summary of Group / Topics Discussed:  Coping Skills: Self-Soothe: Patients learned to apply self-soothe as a way to decrease heightened stress in the moment.  Patients identified situations that necessitate self-soothe strategies.  They focused on ways to manage physical symptoms of distress using the senses. They discussed how to distinguish when this can be useful in their lives when other strategies are more relevant or helpful.    Patient Session Goals / Objectives:  Understand the purpose of using the senses to decrease distress  Process what happens in the body when using self-soothe strategies  Demonstrate understanding of when to use self-soothe strategies  Identify and problem solve barriers to applying self-soothe strategies.  Choose 1-2 self-soothe strategies to apply during times of distress.      Patient Participation / Response:  Fully participated with the group by sharing personal reflections / insights and openly received / provided feedback with other participants.    Demonstrated understanding of topics discussed through group discussion and participation    Treatment Plan:  Patient has a current master individualized treatment plan.  See Epic treatment plan for more information.    BETTIE Jasso

## 2024-10-18 NOTE — GROUP NOTE
Process Group Note    PATIENT'S NAME: Craig M Wilfahrt  MRN:   [id number]  :   1965  ACCT. NUMBER: [id number]  DATE OF SERVICE: 10/18/24  START TIME:  1:00 PM  END TIME:  1:50 PM  FACILITATOR: Dorothea Elder LICSW  TOPIC:  Process Group    Diagnoses:    . MDD (major depressive disorder), recurrent episode, moderate (H)        [facility]  TRACK: IOP 3     NUMBER OF PARTICIPANTS: 7        Data:    Session content: At the start of this group, patients were invited to check in by identifying themselves, describing their current emotional status, and identifying issues to address in this group.   Area(s) of treatment focus addressed in this session included Symptom Management and Personal Safety.    Patient reported feeling content. Patient discussed working toward being mindful. Patient identified grounding as skills they will use to address their goal(s). Patient reported self may be a barrier to working toward their goal(s) and/or addressing mental health symptoms. Patient reported no safety concerns and/or self-injurious behaviors. Patient reported no substance use. Patient reported they are taking their medications as prescribed. Patient reported feeling proud that they part of group. Patient discussed self with the treatment group.              Therapeutic Interventions/Treatment Strategies:  Treatment modalities used include Cognitive Behavioral Therapy and Dialectical Behavioral Therapy.    Assessment:    Patient response:   Patient responded to session by accepting feedback, giving feedback, and listening    Possible barriers to participation / learning include: and no barriers identified    Health Issues:   None reported       Substance Use Review:   Substance Use: No active concerns identified.    Mental Status/Behavioral Observations  Appearance:   Appropriate   Eye Contact:   Good   Psychomotor Behavior: Normal   Attitude:   Cooperative    Orientation:   All  Speech   Rate / Production: Normal    Volume:  Normal   Mood:    Normal  Affect:    Appropriate   Thought Content:   Clear  Thought Form:  Coherent  Logical     Insight:    Good     Plan:   Safety Plan: No current safety concerns identified.  Recommended that patient call 911 or go to the local ED should there be a change in any of these risk factors.   Barriers to treatment: None identified  Patient Contracts (see media tab):  None  Substance Use: Not addressed in session   Continue or Discharge: Patient will continue in Adult Day Treatment (ADT)  as planned. Patient is likely to benefit from learning and using skills as they work toward the goals identified in their treatment plan.      Dorothea Elder, [facility]  October 18, 2024

## 2024-10-18 NOTE — GROUP NOTE
Psychoeducation Group Note    PATIENT'S NAME: Craig M Wilfahrt  MRN:   [id number]  :   1965  ACCT. NUMBER: [id number]  DATE OF SERVICE: 10/18/24  START TIME:  3:00 PM  END TIME:  3:50 PM  FACILITATOR: Shannan Hays LICSW; Shaunna Harman OTR; Floyd Ferraro OTR  TOPIC: MH Life Skills Group: Lifestyle Balance and Structure  [facility] Mental Health Outpatient Programs  TRACK: IOP/DT 3    NUMBER OF PARTICIPANTS: 6    Summary of Group / Topics Discussed:  Lifestyle Balance and Strucure:  Lifestyle Balance and Structure: Leisure Experiential: Provided psychoeducation and discussion on benefits of leisure on stress management, parasympathetic NS activation, and wellness. Provided skilled facilitation and clinical observation of patients in context during a leisure experiential designed to provide patients the opportunity to have a social experience as an opportunity to gain self-awareness, build cohesion and social skills, self-monitor personal performance skills, and identify the benefits of activity on their nervous system. Facilitated reflection on the meaning of leisure for participants and barriers to participating in leisure to support recovery.  Patients reflected on how they can participate in leisure throughout their weekend. Validation, support, and guidance provided throughout group.     Patient Session Goals / Objectives:   Learn through an experiential intervention activity the benefits of leisure.   [place] the mechanisms and benefits of leisure activity to create lifestyle balance and improve mental health.   Identified leisure they can participate in during their weekend.       Patient Participation / Response:  Fully participated with the group by sharing personal reflections / insights and openly received / provided feedback with other participants.    Verbalized understanding of content    Treatment Plan:  Patient has a current master individualized treatment plan.  See Epic  treatment plan for more information.    Shannan Hays, LICSW

## 2024-10-21 ENCOUNTER — HOSPITAL ENCOUNTER (OUTPATIENT)
Dept: BEHAVIORAL HEALTH | Facility: CLINIC | Age: 59
Discharge: HOME | End: 2024-10-21
Attending: PSYCHIATRY & NEUROLOGY
Payer: COMMERCIAL

## 2024-10-21 PROCEDURE — 90853 GROUP PSYCHOTHERAPY: CPT | Performed by: PSYCHOLOGIST

## 2024-10-21 PROCEDURE — 90853 GROUP PSYCHOTHERAPY: CPT

## 2024-10-21 NOTE — GROUP NOTE
Psychotherapy Group Note    PATIENT'S NAME: Craig M Wilfahrt  MRN:   [id number]  :   1965  ACCT. NUMBER: [id number]  DATE OF SERVICE: 10/21/24  START TIME:  2:00 PM  END TIME:  2:50 PM  FACILITATOR: Roselyn Singh LICSW  TOPIC: MH EBP Group: Coping Skills  [facility] Mental Health Outpatient Programs  TRACK: IOP/DT 3 55+    NUMBER OF PARTICIPANTS: 7    Summary of Group / Topics Discussed:  Coping Skills: Radical Acceptance: Patients learned radical acceptance as a way to tolerate heightened stress in the moment.  Patients identified situations that necessitate radical acceptance.  They focused on applying acceptance of the moment to tolerate difficult emotions and events. Patients discussed how to distinguish when this can be useful in their lives and when other skills are more relevant or helpful.    Patient Session Goals / Objectives:  Understand that some amount of pain exists for each of us in our lives  Process the difficulty of acceptance in our lives and benefits of radical acceptance to mood and functioning.  Demonstrate understanding of when to use the radical acceptance to tolerate distress by providing examples of situations where this could be applied.  Identify barriers to applying radical acceptance to difficult situations and explore strategies to overcome them      Patient Participation / Response:  Fully participated with the group by sharing personal reflections / insights and openly received / provided feedback with other participants.    Demonstrated understanding of topics discussed through group discussion and participation, Expressed understanding of the relevance / importance of coping skills at distressing times in life, Demonstrated knowledge of when to consider using a variety of coping skills in daily life, Identified barriers to applying coping skills, and Identified 2-3 positive coping strategies that have helped maintain / improve symptoms in the past    Treatment  Plan:  Patient has a current master individualized treatment plan.  See Epic treatment plan for more information.    BETTIE Flores

## 2024-10-21 NOTE — GROUP NOTE
"Process Group Note    PATIENT'S NAME: Craig M Wilfahrt  MRN:   [id number]  :   1965  ACCT. NUMBER: [id number]  DATE OF SERVICE: 10/21/24  START TIME:  1:00 PM  END TIME:  1:50 PM  FACILITATOR: Roselyn Singh LICSW  TOPIC:  Process Group    Diagnoses:  DSM5 Diagnosis/es:      ICD-10-CM     1. MDD (major depressive disorder), recurrent episode, moderate (H)  F33.1        [facility] Outpatient Programs  TRACK: IOP/DT 3 55+    NUMBER OF PARTICIPANTS: 7        Data:    Session content: At the start of this group, patients were invited to check in by identifying themselves, describing their current emotional status, and identifying issues to address in this group.   Area(s) of treatment focus addressed in this session included Symptom Management, Personal Safety, and Community Resources/Discharge Planning.  Reported mood is \"joyful\".  He enjoyed football games and making soup with his daughter.   Client denied safety concerns, including SI and SIB. Client denies any chemical use.  Client is taking medications as prescribed. Client's goal is \"push myself to do more outside, like yard work or be with the dogs\".  He reported walking his dogs this morning which is \"rare\".   He reports increased ability to practice wise mind. Client reported plans to use the following coping skills: self-soothe, grounding. Client is grateful for his daughter and son home this weekend. He enjoyed watching a movie with them. He declined needing processing time today.    Therapeutic Interventions/Treatment Strategies:  Psychotherapist offered support, feedback and validation and reinforced use of skills. Treatment modalities used include Cognitive Behavioral Therapy and Dialectical Behavioral Therapy. Interventions include Behavioral Activation: Explored how behaviors effect mood and interact with thoughts and feelings and Encouraged strategies to reduce individual procrastination and increase motivation by " "increasing goal-directed activities to enhance mood and reduce symptoms. and Coping Skills: Discussed use of self-soothe skills to decrease distress in the body and Promoted understanding of how and when to apply grounding strategies to reduce distress and increase presence in the moment.    Assessment:    Patient response:   Patient responded to session by accepting feedback, giving feedback, listening, focusing on goals, being attentive, and accepting support    Possible barriers to participation / learning include: and no barriers identified    Health Issues:   None reported       Substance Use Review:   Substance Use: No active concerns identified.    Mental Status/Behavioral Observations  Appearance:   Appropriate   Eye Contact:   Good   Psychomotor Behavior: Normal   Attitude:   Cooperative  Interested [place]  Orientation:   All  Speech   Rate / Production: Normal    Volume:  Normal   Mood:    \"Joyful\"  Affect:    Appropriate   Thought Content:   Clear and Safety denies any current safety concerns including suicidal ideation, self-harm, and homicidal ideation  Thought Form:  Coherent  Logical     Insight:    Good     Plan:   Safety Plan: No current safety concerns identified.  Recommended that patient call 911 or go to the local ED should there be a change in any of these risk factors.   Barriers to treatment: None identified  Patient Contracts (see media tab):  None  Substance Use: Not addressed in session   Continue or Discharge: Patient will continue in 55+ Program (55+) as planned. Patient is likely to benefit from learning and using skills as they work toward the goals identified in their treatment plan.      BETTIE Flores  October 21, 2024  "

## 2024-10-21 NOTE — GROUP NOTE
Psychoeducation Group Note    PATIENT'S NAME: Craig M Wilfahrt  MRN:   [id number]  :   1965  ACCT. NUMBER: [id number]  DATE OF SERVICE: 10/21/24  START TIME:  3:00 PM  END TIME:  3:50 PM  FACILITATOR: Lenka Buenrostro Psy.D, HUMERA; Annabella Manjarrez RN  TOPIC: MH Wellness Group: Health Maintenance  [facility] Mental [facility] Outpatient Programs  TRACK: [facility]/dt3  55+    NUMBER OF PARTICIPANTS: 7    Summary of Group / Topics Discussed:  Health Maintenance: Eight Dimensions of Wellness: The concept of holistic health through the model of eight dimensions was introduced. Group members participated in identifying behaviors and activities in each of the dimensions of wellness.  The importance of each dimension was reinforced and the concept of balance in life as it relates to wellness was explored.      Patient Session Goals / Objectives:  Verbalized understanding of balance in wellness and how it relates to their life  Identified and explained the eight dimensions of wellness  Categorized activities and wellness needs into corresponding dimensions appropriately during exercise        Patient Participation / Response:  Fully participated with the group by sharing personal reflections / insights and openly received / provided feedback with other participants.    Verbalized understanding of health maintenance topic    Treatment Plan:  Patient has a current master individualized treatment plan.  See Epic treatment plan for more information.    Lenka Buenrostro Psy.D, LP

## 2024-10-23 ENCOUNTER — HOSPITAL ENCOUNTER (OUTPATIENT)
Dept: BEHAVIORAL HEALTH | Facility: CLINIC | Age: 59
Discharge: HOME | End: 2024-10-23
Attending: PSYCHIATRY & NEUROLOGY
Payer: COMMERCIAL

## 2024-10-23 PROCEDURE — 90853 GROUP PSYCHOTHERAPY: CPT

## 2024-10-23 PROCEDURE — 90853 GROUP PSYCHOTHERAPY: CPT | Performed by: SOCIAL WORKER

## 2024-10-23 NOTE — GROUP NOTE
Psychoeducation Group Note    PATIENT'S NAME: Craig M Wilfahrt  MRN:   [id number]  :   1965  ACCT. NUMBER: [id number]  DATE OF SERVICE: 10/23/24  START TIME:  3:00 PM  END TIME:  3:50 PM  FACILITATOR: Shannan Hays, BETTIE; Floyd Ferraro OTR  TOPIC:  Life Skills Group: Life Skills  [facility] Outpatient [facility]  TRACK: IOP/DT 3    NUMBER OF PARTICIPANTS: 7    Summary of Group / Topics Discussed:  Life Skills:  Life Skills: Motivation and Behavioral Activation: Provided opportunity for patients to independently choose and engage in a therapeutic activity that supports progress towards their goals and psychiatric recovery. Provided the context for patients to monitor their symptoms, gain self-awareness, practice skills (self-regulation, mindfulness, self-talk, focus/concentration, social, productivity), build a sense of self efficacy and mastery, as well as receive validation, support, and resources. Staff checks in, observes, assesses, and monitors performance skills and patterns in context with each group member.  Patient was provided orientation to motivation enhancing techniques and overview of purpose of participation in activities in assisting to meet their goals.        Patient Session Goals / Objectives:   Independently identify a purposeful and meaningful therapeutic activity.   Identified which goal(s) they are intentionally working on during session.    Reflected on their performance and share insight about their progress.   Practiced and identified a way to generalize a skill to their everyday life.             Patient Participation / Response:  Fully participated with the group by sharing personal reflections / insights and openly received / provided feedback with other participants.    Verbalized understanding of content    Treatment Plan:  Patient has a current master individualized treatment plan.  See Epic treatment plan for more information.    Shannan NORMAN  SONIYA HaysSW

## 2024-10-23 NOTE — GROUP NOTE
Psychotherapy Group Note    PATIENT'S NAME: Craig M Wilfahrt  MRN:   [id number]  :   1965  ACCT. NUMBER: [id number]  DATE OF SERVICE: 10/23/24  START TIME:  2:00 PM  END TIME:  2:50 PM  FACILITATOR: Roselyn Singh LICSW  TOPIC: MH EBP Group: Relationship Skills  [facility]  TRACK: IOP/DT 3 55+    NUMBER OF PARTICIPANTS: 7    Summary of Group / Topics Discussed:  Relationship Skills: Dependencies: Patients were provided with a general overview of different types of dependencies and how they relate to symptoms and functioning. The purpose is to help patients identify the different types of dependencies, how they may be impacted by them, and to gain awareness and skills to work towards healthier relationships.    Patient Session Goals / Objectives:  Familiarized patients with the concept of interpersonal relationships and their characteristics.    Discussed and practiced strategies to promote healthier understanding of interpersonal relationships with a focus on dependencies  Identified types of dependencies in patient s lives and how they impact their symptoms and functioning      Patient Participation / Response:  Fully participated with the group by sharing personal reflections / insights and openly received / provided feedback with other participants.    Demonstrated understanding of topics discussed through group discussion and participation, Demonstrated understanding of relationship skills and communication skills, Identified / Expressed personal readiness to incorporate effective communication skills, Verbalized understanding of communication skills, communication challenges, and communication strengths, and Identified plan to address barriers to practicing relationship skills     Treatment Plan:  Patient has a current master individualized treatment plan.  See Epic treatment plan for more information.    BETTIE Flores

## 2024-10-24 ENCOUNTER — HOSPITAL ENCOUNTER (OUTPATIENT)
Dept: BEHAVIORAL HEALTH | Facility: CLINIC | Age: 59
Discharge: HOME | End: 2024-10-24
Attending: PSYCHIATRY & NEUROLOGY
Payer: COMMERCIAL

## 2024-10-24 PROCEDURE — 90853 GROUP PSYCHOTHERAPY: CPT

## 2024-10-24 PROCEDURE — 90853 GROUP PSYCHOTHERAPY: CPT | Performed by: SOCIAL WORKER

## 2024-10-24 NOTE — GROUP NOTE
Psychotherapy Group Note    PATIENT'S NAME: Craig M Wilfahrt  MRN:   [id number]  :   1965  ACCT. NUMBER: [id number]  DATE OF SERVICE: 10/24/24  START TIME:  2:00 PM  END TIME:  2:50 PM  FACILITATOR: Roselyn Singh LICSW  TOPIC: MH EBP Group: Relationship Skills  [facility] Mental [facility] Outpatient Programs  TRACK: IOP/DT 3 55+    NUMBER OF PARTICIPANTS: 7    Summary of Group / Topics Discussed:  Relationship Skills: Boundaries: Patients were provided with a general overview of interpersonal boundaries and how lack of boundaries relates to symptoms and functioning. The purpose is to help patients identify boundary issues and gain awareness and skills to work towards healthier interpersonal boundaries. Current awareness of healthy boundary characteristics and barriers to establishing healthy boundaries were discussed.    Patient Session Goals / Objectives:  Familiarized patients with the concept of interpersonal boundaries and their characteristics  Discussed and practiced strategies to promote healthier interpersonal boundaries  Identified boundary issues and identified plan to improve boundaries      Patient Participation / Response:  Fully participated with the group by sharing personal reflections / insights and openly received / provided feedback with other participants.    Demonstrated understanding of topics discussed through group discussion and participation, Demonstrated understanding of relationship skills and communication skills, Identified / Expressed personal readiness to incorporate effective communication skills, Verbalized understanding of communication skills, communication challenges, and communication strengths, and Identified plan to address barriers to practicing relationship skills     Treatment Plan:  Patient has a current master individualized treatment plan.  See Epic treatment plan for more information.    BETTIE Flores

## 2024-10-24 NOTE — GROUP NOTE
"Process Group Note    PATIENT'S NAME: Craig M Wilfahrt  MRN:   [id number]  :   1965  ACCT. NUMBER: [id number]  DATE OF SERVICE: 10/24/24  START TIME:  1:00 PM  END TIME:  1:50 PM  FACILITATOR: Roselyn Singh LICSW  TOPIC:  Process Group    Diagnoses:  DSM5 Diagnosis/es:      ICD-10-CM     1. MDD (major depressive disorder), recurrent episode, moderate (H)  F33.1        [facility] Mental [facility] Outpatient Programs  TRACK: IOP 1    NUMBER OF PARTICIPANTS: 7        Data:    Session content: At the start of this group, patients were invited to check in by identifying themselves, describing their current emotional status, and identifying issues to address in this group.   Area(s) of treatment focus addressed in this session included Symptom Management, Personal Safety, and Community Resources/Discharge Planning.  Prior to group starting, Lam requested to sign an SHANEL for his wife Alisa.  Writer agreed to connect with his wife on current symptoms and goals.     Reported mood is \"good\" but depressed.  Notes anxiety is a \"2 or 3 out of 10\". He reports getting up earlier than normal today, going on a 1.5 mile hike with his daughter and dog. He reports making a list of daily tasks today. Client denied safety concerns, including SI and SIB. Client denies any chemical use.  Client is taking medications as prescribed. Client's goal is \"exercise\".  He went to the grocery store at the request of his wife last evening where he rewarded himself with a treat.  He plans to reach out to his friend Naresh pascal to ask if he can meet on Tuesday. He declined further processing time.    Therapeutic Interventions/Treatment Strategies:  Psychotherapist offered support, feedback and validation and reinforced use of skills. Treatment modalities used include Cognitive Behavioral Therapy and Dialectical Behavioral Therapy. Interventions include Behavioral Activation: Reinforced benefits/challenges of change process " through applying skills to replace unwanted behaviors, Explored how behaviors effect mood and interact with thoughts and feelings, and Encouraged strategies to reduce individual procrastination and increase motivation by increasing goal-directed activities to enhance mood and reduce symptoms..    Assessment:    Patient response:   Patient responded to session by accepting feedback, giving feedback, listening, focusing on goals, being attentive, and accepting support    Possible barriers to participation / learning include: and no barriers identified    Health Issues:   None reported       Substance Use Review:   Substance Use: No active concerns identified.    Mental Status/Behavioral Observations  Appearance:   Appropriate   Eye Contact:   Good   Psychomotor Behavior: Normal   Attitude:   Cooperative  Interested Friendly Pleasant  Orientation:   All  Speech   Rate / Production: Normal    Volume:  Normal   Mood:    Anxious  Depressed   Affect:    Appropriate   Thought Content:   Clear and Safety denies any current safety concerns including suicidal ideation, self-harm, and homicidal ideation  Thought Form:  Coherent  Logical     Insight:    Good     Plan:   Safety Plan: No current safety concerns identified.  Recommended that patient call 911 or go to the local ED should there be a change in any of these risk factors.   Barriers to treatment: None identified  Patient Contracts (see media tab):  None  Substance Use: Not addressed in session   Continue or Discharge: Patient will continue in 55+ Program (55+) as planned. Patient is likely to benefit from learning and using skills as they work toward the goals identified in their treatment plan.      Roselyn Singh, [facility] Maine Coast HospitalSW  October 24, 2024

## 2024-10-24 NOTE — GROUP NOTE
"Psychoeducation Group Note    PATIENT'S NAME: Craig M Wilfahrt  MRN:   [id number]  :   1965  ACCT. NUMBER: [id number]  DATE OF SERVICE: 10/24/24  START TIME:  3:00 PM  END TIME:  3:50 PM  FACILITATOR: Shannan Hays LICSW; Annabella Manjarrez RN  TOPIC:  Wellness Group: [facility] Adult Mental Health Outpatient Programs  TRACK: IOP/DT 3    NUMBER OF PARTICIPANTS: 7    Summary of Group / Topics Discussed:  [facility]: Resilience. Patients spent time exploring what resiliency means to them. Patients were encouraged to bring to mind someone they look up to in life and assess resilience in that person. Group will discuss ways to foster resiliency--making connections, avoid the tendency to view crises as insurmountable challenges, accept that change is an unavoidable part of life, move toward realistic goals, take decisive actions, look for opportunities for self-discovery, nurture a positive view of yourself, keep things in perspective, maintain a hopeful outlook on life, and take care of yourself.        Patient Session Goals / Objectives:    Patients will identify what it means to them to be \"resilient.\"    Patients will explore ways to develop, foster, and maintain resiliency.     Patients will engage in conversational prompts (taken from above-mentioned ways to develop resilience) in the form of a game to personally consider ways to increase resiliency.           Patient Participation / Response:  Fully participated with the group by sharing personal reflections / insights and openly received / provided feedback with other participants.    Demonstrated understanding of topics discussed through group discussion and participation and Identified / Expressed personal readiness to practice skills    Treatment Plan:  Patient has a current master individualized treatment plan.  See Epic treatment plan for more information.    BETTIE Jasso  "

## 2024-10-24 NOTE — GROUP NOTE
"Process Group Note    PATIENT'S NAME: Craig M Wilfahrt  MRN:   [id number]  :   1965  ACCT. NUMBER: [id number]  DATE OF SERVICE: 10/23/24  START TIME:  1:00 PM  END TIME:  1:50 PM  FACILITATOR: Roselyn Singh LICSW  TOPIC:  Process Group    Diagnoses:  DSM5 Diagnosis/es:      ICD-10-CM     1. MDD (major depressive disorder), recurrent episode, moderate (H)  F33.1        [facility] Outpatient [facility]  TRACK: IOP/DT 3 55+    NUMBER OF PARTICIPANTS: 7        Data:    Session content: At the start of this group, patients were invited to check in by identifying themselves, describing their current emotional status, and identifying issues to address in this group.   Area(s) of treatment focus addressed in this session included Symptom Management, Personal Safety, and Community Resources/Discharge Planning.  Reported mood is \"irritated with myself\".  He reported not completing intended tasks yesterday (\"I was in my chair or on the couch, I had no motivation\").  He gave himself credit for walking the dog and exercising in the evening and starting laundry this morning.  Client denied safety concerns, including SI and SIB. Client denies any chemical use.  Client is taking medications as prescribed. Client's goal is to exercise. He requested processing time to explore how to not \"waste\" Tuesday (non-group day). He notices a pattern of waking up at 5am to take the dogs out and then going back to sleep. He was agreeable to the following plan: make social plans for , make a list and pick 1 to 2 items to complete each morning.  He enjoys household tasks like leaf blowing.  He has social plans on Thursday to watch football with a neighbor. Peers offered supportive feedback and validation.    Therapeutic Interventions/Treatment Strategies:  Psychotherapist offered support, feedback and validation and reinforced use of skills. Treatment modalities used include Cognitive Behavioral Therapy " and Dialectical Behavioral Therapy. Interventions include Behavioral Activation: Reinforced benefits/challenges of change process through applying skills to replace unwanted behaviors, Explored how behaviors effect mood and interact with thoughts and feelings, and Encouraged strategies to reduce individual procrastination and increase motivation by increasing goal-directed activities to enhance mood and reduce symptoms..    Assessment:    Patient response:   Patient responded to session by accepting feedback, giving feedback, listening, focusing on goals, being attentive, and accepting support    Possible barriers to participation / learning include: and no barriers identified    Health Issues:   None reported       Substance Use Review:   Substance Use: No active concerns identified.    Mental Status/Behavioral Observations  Appearance:   Appropriate   Eye Contact:   Good   Psychomotor Behavior: Normal   Attitude:   Cooperative  Interested Friendly Pleasant  Orientation:   All  Speech   Rate / Production: Normal    Volume:  Normal   Mood:    Irritated; Anhedonic  Affect:    Appropriate   Thought Content:   Clear and Safety denies any current safety concerns including suicidal ideation, self-harm, and homicidal ideation  Thought Form:  Coherent  Logical     Insight:    Good     Plan:   Safety Plan: No current safety concerns identified.  Recommended that patient call 911 or go to the local ED should there be a change in any of these risk factors.   Barriers to treatment: None identified  Patient Contracts (see media tab):  None  Substance Use: Not addressed in session   Continue or Discharge: Patient will continue in 55+ Program (55+) as planned. Patient is likely to benefit from learning and using skills as they work toward the goals identified in their treatment plan.      Roselyn Singh, SONIYASW  October 24, 2024

## 2024-10-25 ENCOUNTER — TELEPHONE (OUTPATIENT)
Dept: BEHAVIORAL HEALTH | Facility: CLINIC | Age: 59
End: 2024-10-25
Payer: COMMERCIAL

## 2024-10-25 NOTE — TELEPHONE ENCOUNTER
"Writer spoke with patient's wife Alisa after Lam requested to sign an SHANEL yesterday.  Alisa left a voicemail around 4:20pm yesterday where she expressed concern with what Lam reports in group versus what she observes at home.  Writer called and spoke with Alisa for 40 minutes. She observes Lam sleeping 15-16 hours per day.  She reports Lam requiring frequent encouragement from wife and daughter before he will do any household or social tasks. Alisa reports taking on a \"caregiver\" role, completing tasks like getting his medication and scheduling appointments.  Writer reviewed treatment goals and encouraged collaboration and positive reinforcement. She is hopeful that Lam can be more forthcoming on his daily goals set in IOP.  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  She reports reading his progress notes and noticing an inaccurate note that implied he was at the cabin completing chores.  She clarified that it was her that was there and he stayed home.    Writer got a voicemail from Lam 1 hour later reporting his absence from group.  Writer called him back where he said he had an \"upset stomach\".  Writer informed Lam of earlier conversation with his wife which he reports he was unaware of prior to calling in absent.  He reports that he sleeps more around 12 hours per day instead of 15.  Writer discussed barriers to motivation, including negative self-talk and low self-trust. Writer encouraged him to practice positive self-affirmations (list sent via email). Writer also encouraged him to communicate to his wife what he needs to hear to feel supported and motivated.  He was agreeable to set daily SMART goals and report them to his wife each day for positive reinforcement and accountability.    BETTIE Flores on 10/25/2024 at 4:00 PM          "

## 2024-10-28 ENCOUNTER — HOSPITAL ENCOUNTER (OUTPATIENT)
Dept: BEHAVIORAL HEALTH | Facility: CLINIC | Age: 59
Discharge: HOME | End: 2024-10-28
Attending: PSYCHIATRY & NEUROLOGY
Payer: COMMERCIAL

## 2024-10-28 PROCEDURE — 90853 GROUP PSYCHOTHERAPY: CPT | Performed by: SOCIAL WORKER

## 2024-10-28 PROCEDURE — 90832 PSYTX W PT 30 MINUTES: CPT

## 2024-10-28 PROCEDURE — 90853 GROUP PSYCHOTHERAPY: CPT
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
## 2024-10-28 NOTE — PROGRESS NOTES
Intensive Outpatient Program    Physician Recertification of Medical Necessity    Patient Legal Name: Craig M Wilfahrt   Patient Preferred Name: Lam  Patient : 1965  Patient MRN: [id number]    Attending physician: Patrick Fine MD    Recertification #1 from date 10/28/2024 through date 2024    I certify the above-named patient would require partial hospitalization care if intensive outpatient services were not provided and that the patient requires such IOP services for a minimum of 9 hours per week. These services are provided under the care and supervision of a physician and under a written, individualized plan of treatment authorized and approved by the physician.    Patient's response to the therapeutic interventions provided by IOP:  Improved activation  Learning, implementing new skills      Patient's psychiatric symptoms that continue to place the patient at risk of need for higher level of care:  Still poorer socialization; looking to establish more regular contact with friends  Plan to extend a few days beyond initial certification but likely not more      Treatment Goals for coordination of services to facilitate discharge from the intensive outpatient program:    Goal # 1: continue efforts to organize belongings    Goal # 2: continue to reach out for regularly scheduled outings with friends, including for exercise    Goal # 3: exercise more regularly    Anticipated discharge 11/15/2024      Patrick Fine MD on 10/28/2024 at 3:09 PM

## 2024-10-28 NOTE — GROUP NOTE
Psychoeducation Group Note    PATIENT'S NAME: Craig M Wilfahrt  MRN:   [id number]  :   1965  ACCT. NUMBER: [id number]  DATE OF SERVICE: 10/28/24  START TIME:  3:00 PM  END TIME:  3:50 PM  FACILITATOR: Shannan Hays LICSW; Annabella Manjarrez RN  TOPIC: MH Life Skills Group: Communication and Social Skills Development  [facility] Mental Health Outpatient Programs  TRACK: IOP/DT 3    NUMBER OF PARTICIPANTS: 5    Summary of Group / Topics Discussed:    Communication and Social Skills Development: Communication Styles: Anatomy of Trust: BRAVING Skill: Patients were taught and gained awareness of interpersonal relationships, specifically trust.  Patients watched a video by Ramona Jackson and were introduced to the acronym BRAVING as a tool to reflect on issues in important relationships in a more manageable way and use skills to express the true issue of the conflict.  Patients also reflected on self trust and self compassion.       Patient Session Goals / Objectives:   Identified skills that help improve interpersonal relationships and express conflict       Improved awareness of important aspects of trust in interpersonal relationships and how this relates to mental health recovery       Established a plan for practice of these skills in their own environments   Practiced and reflected on how to generalize taught skills to their everyday life      Patient Participation / Response:  Fully participated with the group by sharing personal reflections / insights and openly received / provided feedback with other participants.    Verbalized understanding of content    Treatment Plan:  Patient has a current master individualized treatment plan.  See Epic treatment plan for more information.    BETTIE Jasso

## 2024-10-28 NOTE — GROUP NOTE
Psychoeducation Group Note    PATIENT'S NAME: Craig M Wilfahrt  MRN:   [id number]  :   1965  ACCT. NUMBER: [id number]  DATE OF SERVICE: 10/28/24  START TIME:  2:00 PM  END TIME:  2:50 PM  FACILITATOR: Roselyn Singh LICSW  TOPIC: MH Life Skills Group: Lifestyle Balance and Structure  [facility] Adult Mental Health Outpatient Programs  TRACK: IOP/DT 3 55+    NUMBER OF PARTICIPANTS: 5    Summary of Group / Topics Discussed:  Lifestyle Balance and Strucure:  Goal-setting & integration: Patients were introduced to the process and benefits of setting realistic, timely, and achievable goals to help support their ability to follow through with meaningful personal, health, recovery and treatment goals that they would like to achieve to improve overall functioning.  Patients were also taught and then practiced techniques to manage a variety of obstacles to achieve their goals and how to break goals into manageable pieces.  Patients also reported on follow through of goals.     Patient Session Goals / Objectives:  Facilitated the creation of a vision for recovery and wellbeing  Identified and wrote meaningful SMART goals to engage in meaningful activities of daily living   Identified and problem solved barriers to achieving goals   Identified plan to support follow through on goals and reflection on progress made      Patient Participation / Response:  Fully participated with the group by sharing personal reflections / insights and openly received / provided feedback with other participants.    Verbalized understanding of content and Patient made progress towards meeting ITP goal number 3&4    Treatment Plan:  Patient has a current master individualized treatment plan.  See Epic treatment plan for more information.    BETTIE Flores

## 2024-10-29 NOTE — GROUP NOTE
"Process Group Note    PATIENT'S NAME: Craig M Wilfahrt  MRN:   [id number]  :   1965  ACCT. NUMBER: [id number]  DATE OF SERVICE: 10/28/24  START TIME:  1:00 PM  END TIME:  1:50 PM  FACILITATOR: Roselyn Singh LICSW  TOPIC:  Process Group    Diagnoses:  DSM5 Diagnosis/es:      ICD-10-CM     1. MDD (major depressive disorder), recurrent episode, moderate (H)  F33.1        [facility] Adult Mental Health Outpatient Programs  TRACK: IOP/DT 3 55+    NUMBER OF PARTICIPANTS: 5        Data:    Session content: At the start of this group, patients were invited to check in by identifying themselves, describing their current emotional status, and identifying issues to address in this group.   Area(s) of treatment focus addressed in this session included Symptom Management, Personal Safety, and Community Resources/Discharge Planning.  Reported mood is \"proud\".  He reflected positively on his weekend spent watching his son in law play football in [place] and with his son at home.   Client denied safety concerns, including SI and SIB. Client's goal is to mow the lawn when he gets home.  Client reported plans to use the following coping skills: \"wise mind\", rewarding himself when he completes productive tasks. Client is proud of himself for reaching out to a friend to set up Tuesday plans.  This friend belongs to his support system at his health club.  He is hoping this connection gives him \"leverage\" with his exercise goal. Client is grateful for the weather. He declined further processing time.      Writer connected with patient individually to follow up from Friday (see 1:1 progress note).    Therapeutic Interventions/Treatment Strategies:  Psychotherapist offered support, feedback and validation and reinforced use of skills. Treatment modalities used include Cognitive Behavioral Therapy and Dialectical Behavioral Therapy. Interventions include Behavioral Activation: Explored how behaviors effect mood and " interact with thoughts and feelings and Encouraged strategies to reduce individual procrastination and increase motivation by increasing goal-directed activities to enhance mood and reduce symptoms. and Mindfulness: Facilitated discussion of when/how to use mindfulness skills to benefit general health, mental health symptoms, and stressors.    Assessment:    Patient response:   Patient responded to session by accepting feedback, giving feedback, listening, focusing on goals, being attentive, and accepting support    Possible barriers to participation / learning include: and no barriers identified    Health Issues:   None reported       Substance Use Review:   Substance Use: No active concerns identified.    Mental Status/Behavioral Observations  Appearance:   Appropriate   Eye Contact:   Good   Psychomotor Behavior: Normal   Attitude:   Cooperative  Interested Friendly Pleasant  Orientation:   All  Speech   Rate / Production: Normal    Volume:  Normal   Mood:    Anxious  Depressed   Affect:    Appropriate   Thought Content:   Clear and Safety denies any current safety concerns including suicidal ideation, self-harm, and homicidal ideation  Thought Form:  Coherent  Logical     Insight:    Good     Plan:   Safety Plan: No current safety concerns identified.  Recommended that patient call 911 or go to the local ED should there be a change in any of these risk factors.   Barriers to treatment: None identified  Patient Contracts (see media tab):  None  Substance Use: Not addressed in session   Continue or Discharge: Patient will continue in 55+ Program (55+) as planned. Patient is likely to benefit from learning and using skills as they work toward the goals identified in their treatment plan.      Roselyn Singh, [facility]  October 29, 2024

## 2024-10-30 ENCOUNTER — HOSPITAL ENCOUNTER (OUTPATIENT)
Dept: BEHAVIORAL HEALTH | Facility: CLINIC | Age: 59
Discharge: HOME | End: 2024-10-30
Attending: PSYCHIATRY & NEUROLOGY
Payer: COMMERCIAL

## 2024-10-30 DIAGNOSIS — F33.1 MAJOR DEPRESSIVE DISORDER, RECURRENT EPISODE, MODERATE WITH ANXIOUS DISTRESS (H): Primary | ICD-10-CM

## 2024-10-30 PROCEDURE — 90853 GROUP PSYCHOTHERAPY: CPT | Performed by: SOCIAL WORKER

## 2024-10-30 PROCEDURE — 90853 GROUP PSYCHOTHERAPY: CPT

## 2024-10-30 RX ORDER — DESVENLAFAXINE 100 MG/1
100 TABLET, EXTENDED RELEASE ORAL DAILY
Qty: 30 TABLET | Refills: 0 | Status: SHIPPED | OUTPATIENT
Start: 2024-10-30

## 2024-10-30 NOTE — PROGRESS NOTES
Patient requested a refill of Desvenlafaxine. I called to confirm the medication dose, which the patient stated is 100 mg daily. I updated the dose in the MAR and sent the refill request to the pharmacy.

## 2024-10-30 NOTE — GROUP NOTE
Psychotherapy Group Note    PATIENT'S NAME: Craig M Wilfahrt  MRN:   [id number]  :   1965  ACCT. NUMBER: [id number]  DATE OF SERVICE: 10/30/24  START TIME:  2:00 PM  END TIME:  2:50 PM  FACILITATOR: Roselyn Singh LICSW  TOPIC:  EBP Group: Social Support  [facility] Mental Health Outpatient Programs  TRACK: IOP/DT 3 55+    NUMBER OF PARTICIPANTS: 6    Summary of Group / Topics Discussed:  Social Support:  Coping with loneliness, isolation and making social connections: The patients engaged in a discussion of how loneliness and social isolation are related to psychosocial impairment. The patients benefitted from this group by exploring ways in which they can re-engage their social network to decrease feelings of isolation and loneliness.    Patient Session Goals / Objectives:  Discussed statistics around the prevalence of loneliness and isolation  Discussed strategies for fostering meaningful connections with new and existing supports  Identify ways that support network can assist with recovery.   Improve interpersonal communication regarding symptoms & self-needs      Patient Participation / Response:  Fully participated with the group by sharing personal reflections / insights and openly received / provided feedback with other participants.    Patient participated via verbal feedback, sharing experiences and active listening.    Treatment Plan:  Patient has a current master individualized treatment plan.  See Epic treatment plan for more information.    BETTIE Flores

## 2024-10-30 NOTE — GROUP NOTE
Psychoeducation Group Note    PATIENT'S NAME: Craig M Wilfahrt  MRN:   [id number]  :   1965  ACCT. NUMBER: [id number]  DATE OF SERVICE: 10/30/24  START TIME:  3:00 PM  END TIME:  3:50 PM  FACILITATOR: Shannan Hays LICSW; Floyd Ferraro OTR  TOPIC: MH Life Skills Group: Resiliency Development  [facility] Adult Mental Health Outpatient Programs  TRACK: IOP/DT 3    NUMBER OF PARTICIPANTS: 5    Summary of Group / Topics Discussed:  Resiliency Development:  Vision Board for Wellness:  Provided education on wellness as an active process to reach a state of health and fulfillment. Facilitated discussion on the holistic understanding of wellness (physical, emotional, social, spiritual, mental, and environmental) and talked collaboratively about their personal ideas of wellness. Facilitated a self-reflective process where patients identified wellness values and goals through a creative expression task. Patients shared with the group their wellness vision board and reasoning behind their wellness visions. Validation and support provided throughout group process.     Patient Session Goals / Objectives:   Discuss the topic of wellness in a collaborative, supportive manner.   Identify personal wellness motives and goals.   Engage in an experiential intervention to explore wellness in an individualized manner.   Reflect on the process and share insight around their engagement in the creative expression task.        Patient Participation / Response:  Fully participated with the group by sharing personal reflections / insights and openly received / provided feedback with other participants.    Verbalized understanding of content    Treatment Plan:  Patient has a current master individualized treatment plan.  See Epic treatment plan for more information.    BETTIE Jasso

## 2024-10-31 ENCOUNTER — HOSPITAL ENCOUNTER (OUTPATIENT)
Dept: BEHAVIORAL HEALTH | Facility: CLINIC | Age: 59
Discharge: HOME | End: 2024-10-31
Attending: PSYCHIATRY & NEUROLOGY
Payer: COMMERCIAL

## 2024-10-31 PROCEDURE — 90853 GROUP PSYCHOTHERAPY: CPT | Performed by: SOCIAL WORKER

## 2024-10-31 PROCEDURE — 90853 GROUP PSYCHOTHERAPY: CPT

## 2024-10-31 NOTE — GROUP NOTE
"Process Group Note    PATIENT'S NAME: Craig M Wilfahrt  MRN:   [id number]  :   1965  ACCT. NUMBER: [id number]  DATE OF SERVICE: 10/31/24  START TIME:  1:00 PM  END TIME:  1:50 PM  FACILITATOR: Roselyn Singh LICSW  TOPIC:  Process Group    Diagnoses:  DSM5 Diagnosis/es:      ICD-10-CM     1. MDD (major depressive disorder), recurrent episode, moderate (H)  F33.1        [facility] Mental Health Outpatient Programs  TRACK: IOP/DT 3 55+    NUMBER OF PARTICIPANTS: 5        Data:    Session content: At the start of this group, patients were invited to check in by identifying themselves, describing their current emotional status, and identifying issues to address in this group.   Area(s) of treatment focus addressed in this session included Symptom Management, Personal Safety, and Community Resources/Discharge Planning.  Reported mood is \"excited\" as he enjoys the snowy weather.   Client denied safety concerns, including SI and SIB. Client's goal is \"get home safe\"; enjoy dinner with son in laws family.  Client reported plans to use the following coping skills: build positive experiences, build self-trust and confidence using BRAVING. Peers offered supportive feedback and validation.    Therapeutic Interventions/Treatment Strategies:  Psychotherapist offered support, feedback and validation and reinforced use of skills. Treatment modalities used include Cognitive Behavioral Therapy and Dialectical Behavioral Therapy. Interventions include Cognitive Restructuring:  Facilitated recognition of the connection between negative thoughts and negative core beliefs and Assisted patient in identifying new neutral/positive core beliefs and Coping Skills: Assisted patient in understanding the purpose of planning / creating / participating / sharing in positive experiences.    Assessment:    Patient response:   Patient responded to session by accepting feedback, giving feedback, listening, focusing on goals, " "being attentive, and accepting support    Possible barriers to participation / learning include: and no barriers identified    Health Issues:   None reported       Substance Use Review:   Substance Use: No active concerns identified.    Mental Status/Behavioral Observations  Appearance:   Appropriate   Eye Contact:   Good   Psychomotor Behavior: Normal   Attitude:   Cooperative  Interested Friendly Pleasant  Orientation:   All  Speech   Rate / Production: Normal/ Responsive Normal    Volume:  Normal   Mood:    \"Excited\"  Affect:    Appropriate   Thought Content:   Clear and Safety denies any current safety concerns including suicidal ideation, self-harm, and homicidal ideation  Thought Form:  Coherent  Logical     Insight:    Good     Plan:   Safety Plan: No current safety concerns identified.  Recommended that patient call 911 or go to the local ED should there be a change in any of these risk factors.   Barriers to treatment: None identified  Patient Contracts (see media tab):  None  Substance Use: Not addressed in session   Continue or Discharge: Patient will continue in 55+ Program (55+) as planned. Patient is likely to benefit from learning and using skills as they work toward the goals identified in their treatment plan.      Roselyn Singh [facility]  October 31, 2024  "

## 2024-10-31 NOTE — GROUP NOTE
Addended by: Marcial Yadav on: 11/14/2018 03:34 PM 
 
 Modules accepted: Orders Psychoeducation Group Note    PATIENT'S NAME: Craig M Wilfahrt  MRN:   [id number]  :   1965  ACCT. NUMBER: [id number]  DATE OF SERVICE: 10/31/24  START TIME:  3:00 PM  END TIME:  3:50 PM  FACILITATOR: Shannan Hays LICSW; Paula Barajas RN  TOPIC:  Wellness Group: [facility] Adult Mental Health Outpatient Programs  TRACK: IOP/DT3    NUMBER OF PARTICIPANTS: 4    Summary of Group / Topics Discussed:  Health Maintenance: Health Literacy: Patients were provided with education on how to navigate the healthcare system and optimize health outcomes by increasing  health literacy skills and self-management of health. Common medical langage (jargon), abbreviations, terms, and titles were reviewed and discussed. Patients were educated on how to find reliable sources of health information, how to make appointments, ways to prepare for appointments to get the most out of them, and routine health maintenance guidelines for screenings, check-ups, and exams were reviewed.     Patient Session Goals / Objectives:  ?    Identified ways to find reliable health information and methods of evaluating the reliability of health information  ?    Explained how to make appointments, prepare for appointment, and how to get the most out of your appointment  ?    Identified the routine health maintenance guidelines (dentist, physical exams, eye exams) and how those fit into their situation        Patient Participation / Response:  Fully participated with the group by sharing personal reflections / insights and openly received / provided feedback with other participants.    Demonstrated understanding of topics discussed through group discussion and participation and Verbalized understanding of health maintenance topic    Treatment Plan:  Patient has a current master individualized treatment plan.  See Epic treatment plan for more information.    BETTIE Jasso

## 2024-10-31 NOTE — GROUP NOTE
"Process Group Note    PATIENT'S NAME: Craig M Wilfahrt  MRN:   [id number]  :   1965  ACCT. NUMBER: [id number]  DATE OF SERVICE: 10/30/24  START TIME:  1:00 PM  END TIME:  1:50 PM  FACILITATOR: Roseyln Singh LICSW  TOPIC:  Process Group    Diagnoses:  DSM5 Diagnosis/es:      ICD-10-CM     1. MDD (major depressive disorder), recurrent episode, moderate (H)  F33.1        [facility] Mental Health Outpatient Programs  TRACK: IOP/DT 3 55+    NUMBER OF PARTICIPANTS: 6        Data:    Session content: At the start of this group, patients were invited to check in by identifying themselves, describing their current emotional status, and identifying issues to address in this group.   Area(s) of treatment focus addressed in this session included Symptom Management, Personal Safety, and Community Resources/Discharge Planning.  Reported mood is \"engaged\".  He reported completing yard work yesterday (\"I made a dent in the leaves\").   Client denied safety concerns, including SI and SIB.  Client's goal is complete some household tasks in preparation for his son in law; more yard work.  Client reported plans to use the following coping skills: self-soothe, wise mind. Client talked about looking forward to having dinner with the \"in laws\". Client is proud of scheduling lunch with his friend on Tuesday. Peers offered supportive feedback and validation.    Therapeutic Interventions/Treatment Strategies:  Psychotherapist offered support, feedback and validation and reinforced use of skills. Treatment modalities used include Cognitive Behavioral Therapy and Dialectical Behavioral Therapy. Interventions include Behavioral Activation: Explored how behaviors effect mood and interact with thoughts and feelings and Encouraged strategies to reduce individual procrastination and increase motivation by increasing goal-directed activities to enhance mood and reduce symptoms. and Coping Skills: Discussed use of self-soothe " "skills to decrease distress in the body and Assisted patient in understanding the purpose of planning / creating / participating / sharing in positive experiences.    Assessment:    Patient response:   Patient responded to session by accepting feedback, giving feedback, listening, focusing on goals, being attentive, and accepting support    Possible barriers to participation / learning include: and no barriers identified    Health Issues:   None reported       Substance Use Review:   Substance Use: No active concerns identified.    Mental Status/Behavioral Observations  Appearance:   Appropriate   Eye Contact:   Good   Psychomotor Behavior: Normal   Attitude:   Cooperative  Interested Friendly Pleasant  Orientation:   All  Speech   Rate / Production: Normal    Volume:  Normal   Mood:    \"Engaged\"  Affect:    Appropriate   Thought Content:   Clear and Safety denies any current safety concerns including suicidal ideation, self-harm, and homicidal ideation  Thought Form:  Coherent  Logical     Insight:    Good     Plan:   Safety Plan: No current safety concerns identified.  Recommended that patient call 911 or go to the local ED should there be a change in any of these risk factors.   Barriers to treatment: None identified  Patient Contracts (see media tab):  None  Substance Use: Not addressed in session   Continue or Discharge: Patient will continue in 55+ Program (55+) as planned. Patient is likely to benefit from learning and using skills as they work toward the goals identified in their treatment plan.      Roselyn Singh, [facility]  October 31, 2024  "

## 2024-10-31 NOTE — GROUP NOTE
Psychoeducation Group Note    PATIENT'S NAME: Craig M Wilfahrt  MRN:   [id number]  :   1965  ACCT. NUMBER: [id number]  DATE OF SERVICE: 10/31/24  START TIME:  3:00 PM  END TIME:  3:50 PM  FACILITATOR: Shannan Hays LICSW; Annabella Manjarrez RN  TOPIC:  Wellness Group: [facility] Adult Mental Health Outpatient Programs  TRACK: IOP/DT 3    NUMBER OF PARTICIPANTS: 4    Summary of Group / Topics Discussed:  Specialty [facility]: Communication and Social Skills Development: Communication Styles: Anatomy of Trust: BRAVING Skill Part II: Patients were taught and gained awareness of interpersonal relationships, specifically trust.  Patients watched a video by Ramona Jackson and were introduced to the acronym BRAVING as a tool to reflect on issues in important relationships in a more manageable way and use skills to express the true issue of the conflict.  Patients also reflected on self trust and self compassion.       Patient Session Goals / Objectives:             Identified skills that help improve interpersonal relationships and express conflict                 Improved awareness of important aspects of trust in interpersonal relationships and how this relates to mental health recovery                 Established a plan for practice of these skills in their own environments             Practiced and reflected on how to generalize taught skills to their everyday life            Patient Participation / Response:  {OP MH PROGRESS NOTE PATIENT PARTICIPATION:043106}    {OP  RN GROUP NOTE [facility]:199329}    Treatment Plan:  Patient has {OP  PROGRAMMATIC TX PLAN STATUS:660930}    BETTIE Jasso

## 2024-11-01 ENCOUNTER — HOSPITAL ENCOUNTER (OUTPATIENT)
Dept: BEHAVIORAL HEALTH | Facility: CLINIC | Age: 59
Discharge: HOME | End: 2024-11-01
Attending: PSYCHIATRY & NEUROLOGY
Payer: COMMERCIAL

## 2024-11-01 DIAGNOSIS — F33.1 MAJOR DEPRESSIVE DISORDER, RECURRENT EPISODE, MODERATE WITH ANXIOUS DISTRESS (H): Primary | ICD-10-CM

## 2024-11-01 PROCEDURE — 90853 GROUP PSYCHOTHERAPY: CPT | Performed by: SOCIAL WORKER

## 2024-11-01 PROCEDURE — 90853 GROUP PSYCHOTHERAPY: CPT

## 2024-11-01 PROCEDURE — 99214 OFFICE O/P EST MOD 30 MIN: CPT

## 2024-11-01 NOTE — GROUP NOTE
Psychotherapy Group Note    PATIENT'S NAME: Craig M Wilfahrt  MRN:   [id number]  :   1965  ACCT. NUMBER: [id number]  DATE OF SERVICE: 10/31/24  START TIME:  2:00 PM  END TIME:  2:50 PM  FACILITATOR: Roselyn Singh LICSW  TOPIC:  EBP Group: Social Support  [facility] Mental Health Outpatient Programs  TRACK: IOP/DT 3 55+    NUMBER OF PARTICIPANTS: 5    Summary of Group / Topics Discussed:  Social Support:  Coping with loneliness, isolation and making social connections (part 2): The patients engaged in a discussion of how loneliness and social isolation are related to psychosocial impairment. The patients benefitted from this group by exploring ways in which they can re-engage their social network to decrease feelings of isolation and loneliness.     Patient Session Goals / Objectives:  Discussed statistics around the prevalence of loneliness and isolation  Discussed strategies for fostering meaningful connections with new and existing supports  Identify ways that support network can assist with recovery.   Improve interpersonal communication regarding symptoms & self-needs      Patient Participation / Response:  Fully participated with the group by sharing personal reflections / insights and openly received / provided feedback with other participants.    Patient participated via verbal feedback and active listening.    Treatment Plan:  Patient has a current master individualized treatment plan.  See Epic treatment plan for more information.    BETTIE Flores

## 2024-11-01 NOTE — PROGRESS NOTES
"[facility]   Adult Mental Health Outpatient Programs  Psychiatric Progress Record    Program Track: IOP/DT 3 55+    PATIENT'S NAME: Craig M Wilfahrt  MRN:   [id number]  :   1965  ACCT. NUMBER: [id number]  DATE OF SERVICE: 24    Interval History:  \"good.\" Lam presents today for follow-up and ongoing program supervision.   Endorses:  I cannot tell if I am getting the reaction [how quick to get med to work] to medication  Anxiety is kind of gone. I am not feeling anxious anymore  I am not having racing thought as much as before. I can concentrate more when driving. I am listening to the radio now  The anxiety level is better. It is not yet where I want to be but it is better  I am starting to do things that I have done and in the past and enjoyed    Review of Symptoms  Sleep: is fine, I get about 9 hours  Appetite: good, probably I eat too much  Suicidal ideation: denies current or recent suicidal ideation or behavior  Thoughts of non-suicidal self-injury: denied  Recent self-injurious behavior: denied  Homicidal ideation: denied  Other safety concerns: denied    Activities of Daily Living and Related Systems Impacted by Illness:  Hygiene: no noted concerns  Socialization: starting to engage in social activities  Activities of Daily Living: (cleaning, shopping, bills, etc.): it is going ok.   Concerns related to work: no    Substance use:  Alcohol, about 3-4 beers a day, not every day. May be 3 days a week    Response to Program Interventions:  Some of the stuff is very helpful.    Medications:  Current Outpatient Medications   Medication Sig Dispense Refill    atorvastatin (LIPITOR) 20 MG tablet Take 20 mg by mouth daily      busPIRone (BUSPAR) 15 MG tablet Take 15 mg by mouth daily.      cariprazine (VRAYLAR) 3 MG capsule Take 1 capsule (3 mg) by mouth daily. 30 capsule 0    desvenlafaxine (PRISTIQ) 100 MG 24 hr tablet Take 1 tablet (100 mg) by mouth daily. 30 tablet 0    lisinopril (ZESTRIL) " "10 MG tablet Take 20 mg by mouth daily.         The above list was reviewed and updated in EPIC with patient today.     Patient is taking medications as prescribed and denies adverse effects    Laboratory Results:  Most recent labs reviewed. Pertinent updates/findings: None.     Mental Status Examination:  Vital Signs: There were no vitals taken for this visit.   Appearance: adequately groomed, appears stated age, and in no apparent distress.  Attitude: cooperative   Eye Contact: good   Muscle Strength and Tone: normal  Psychomotor Behavior: normal or unremarkable   Gait and Station: normal width, turn, arm swing  Speech: clear, coherent, decreased prosody, regular rate, regular rhythm, and fluent  Associations: No loosening of associations  Thought Process: coherent and goal directed  Thought Content: no evidence of suicidal ideation or homicidal ideation, no evidence of psychotic thought, no auditory hallucinations present, and no visual hallucinations present  Mood: \"anxious\"  Affect: mood congruent  Insight: good  Judgment: intact, adequate for safety  Impulse Control: intact  Oriented to: time, place, person, and situation  Attention Span and Concentration: Normal  Language: Intact  Recent and Remote Memory: Delayed & immediate recall intact  Fund of Knowledge/Assessment of Intelligence: Average  Capacity of Activities of Daily Living: Independent, able to participate in programmatic care services.    Diagnosis/es:    ICD-10-CM    1. Major depressive disorder, recurrent episode, moderate with anxious distress (H)  F33.1           Assessment/Plan:  Lam presents today for follow-up psychiatric evaluation and assessment of progress. He feels that his anxiety has diminished significantly, as he no longer experiences the same level of anxiousness he once did. His racing thoughts have also subsided, allowing him to concentrate more effectively on the tasks at hand. Additionally, he is beginning to re-engage in " activities he used to enjoy, though he acknowledges that his mood has not yet reached his desired level, it has improved overall.  He will continue with psychotherapy, with focus on developing coping skills for mood stabilization.   He is compliant with medication, and no adjustments are recommended at this time.   Follow up in 3 weeks or sooner as needed    Depression  Overall improved   Engage in psychotherapy  Continue with current medication regimen  INCREASED Desvenlafaxine 100 mg daily  Vraylar 3 mg daily  Improve sleep hygiene  Maintain a balanced diet  Engage in physical activities as tolerated     Anxiety  Overall improved   As noted above  Buspirone 15 mg daily      Safety Assessment:  Lam reports suicidal ideation and/or non-suicidal self-injury or thoughts thereof as noted above  Lam is future-oriented and is engaged in treatment planning   I do not feel that Lam meets criteria for a 72-hour involuntary hold and remains appropriate for an outpatient level of care    LUISITO SHERIFF DNP on 11/1/2024 at 2:01 PM    Visit Details:  Type of service: In-person  Location (patient and provider): [facility] Adult Mental Health Outpatient Programmatic Care Offices    Level of Medical Decision Making:   - At least 1 chronic problem that is not stable  - Engaged in prescription drug management during visit (discussed any medication benefits, side effects, alternatives, etc.)  Discussion of management or test interpretation with external physician/other qualified healthcare professional/appropriate source - programmatic care multidisciplinary treatment team    30 min spent on the date of the encounter in chart review, patient visit, review of tests, documentation, care coordination, and/or discussion with other providers about the issues documented above.      This document completed in part using [employer] dictation software and therefore may contain inadvertent word or phrase substitutions.

## 2024-11-01 NOTE — GROUP NOTE
Psychoeducation Group Note    PATIENT'S NAME: Craig M Wilfahrt  MRN:   [id number]  :   1965  ACCT. NUMBER: [id number]  DATE OF SERVICE: 24  START TIME:  3:00 PM  END TIME:  3:50 PM  FACILITATOR: Shannan Hays LICSW; Floyd Ferraro OTR  TOPIC: MH Life Skills Group: Sensory Approaches in Mental Health  [facility] Adult Mental Health Outpatient Programs  TRACK: IOP/DT 3    NUMBER OF PARTICIPANTS: 5    Summary of Group / Topics Discussed:  Sensory Approaches in Mental Health:  Sensory Approaches in Mental Health: Coping Through the Senses: Patients were introduced and taught about neurosensory based skills and strategies related to supporting effective self-regulation skills.  Patients were taught about the eight sensory systems and how they can be used for coping with mental health symptoms and stressors.  Patients were provided with an experiential opportunity to increase self-awareness of helpful sensory input and self-care activities. Patients were introduced how to create supportive environments that encourage use of these skills.          Patient Session Goals / Objectives:   Identified specific and individualized neurosensory skills to help when distressed.     Identified skills learned and how this applies to current daily life.   Established a plan for practice of these skills in their own environments.       Patient Participation / Response:  Fully participated with the group by sharing personal reflections / insights and openly received / provided feedback with other participants.    Verbalized understanding of content    Treatment Plan:  Patient has a current master individualized treatment plan.  See Epic treatment plan for more information.    BETTIE Jasso

## 2024-11-01 NOTE — GROUP NOTE
Psychotherapy Group Note    PATIENT'S NAME: Craig M Wilfahrt  MRN:   [id number]  :   1965  ACCT. NUMBER: [id number]  DATE OF SERVICE: 24  START TIME:  2:00 PM  END TIME:  2:50 PM  FACILITATOR: Roselyn Singh LICSW  TOPIC: MH EBP Group: Specialty Awareness  [facility] Mental Health Outpatient Programs  TRACK: IOP/DT 3 55+    NUMBER OF PARTICIPANTS: 5    Summary of Group / Topics Discussed:  Specialty Topics: Hope: The topic of hope was presented in order to help patients better understand the symptoms of hopelessness and how to become more hopeful. Patients discussed their current awareness of the topic and relevance to their functioning. Individual experiences with symptoms and treatment options were also discussed. Patients explored options for ongoing/future treatment and symptom management.      Patient Session Goals / Objectives:  Discussed definition of hopelessness  Discussed how hopelessness impacts functioning  Set a plan to utilize skills to reduce hopelessness      Patient Participation / Response:  Fully participated with the group by sharing personal reflections / insights and openly received / provided feedback with other participants.    Demonstrated understanding of topics discussed through group discussion and participation, Identified / Expressed readiness to act on skill suggestions discussed in topic, Verbalized understanding of ways to proactively manage illness, and Identified plan to address barriers to practicing skills discussed in topic    Treatment Plan:  Patient has a current master individualized treatment plan.  See Epic treatment plan for more information.    BETTIE Flores

## 2024-11-01 NOTE — GROUP NOTE
"Process Group Note    PATIENT'S NAME: Craig M Wilfahrt  MRN:   [id number]  :   1965  ACCT. NUMBER: [id number]  DATE OF SERVICE: 24  START TIME:  1:00 PM  END TIME:  1:50 PM  FACILITATOR: Roselyn Singh LICSW  TOPIC:  Process Group    Diagnoses:  DSM5 Diagnosis/es:      ICD-10-CM     1. MDD (major depressive disorder), recurrent episode, moderate (H)  F33.1        [facility] Adult Mental Health Outpatient Programs  TRACK: IOP/DT 3 55+    NUMBER OF PARTICIPANTS: 5        Data:    Session content: At the start of this group, patients were invited to check in by identifying themselves, describing their current emotional status, and identifying issues to address in this group.   Area(s) of treatment focus addressed in this session included Symptom Management, Personal Safety, and Community Resources/Discharge Planning.  Reported mood is \"a little bit of confidence\".  He reflected positively on his family dinner last night and an interaction with a neighbor.   Client denied safety concerns, including SI and SIB. Client denies any chemical use.  Client is taking medications as prescribed. Client's goal is clean the shed with his son.  Client reported plans to use the following coping skills: trust using BRAVING acronym. Client is proud of his son in law for meeting with a football agent this afternoon. Peers offered supportive feedback and validation.      Therapeutic Interventions/Treatment Strategies:  Psychotherapist offered support, feedback and validation and reinforced use of skills. Treatment modalities used include Cognitive Behavioral Therapy and Dialectical Behavioral Therapy. Interventions include Coping Skills: Assisted patient in understanding the purpose of planning / creating / participating / sharing in positive experiences and Discussed building trust with self using BRAVING.    Assessment:    Patient response:   Patient responded to session by accepting feedback, giving feedback, " "listening, focusing on goals, being attentive, and accepting support    Possible barriers to participation / learning include: and no barriers identified    Health Issues:   None reported       Substance Use Review:   Substance Use: No active concerns identified.    Mental Status/Behavioral Observations  Appearance:   Appropriate   Eye Contact:   Good   Psychomotor Behavior: Normal   Attitude:   Cooperative  Interested Friendly Pleasant  Orientation:   All  Speech   Rate / Production: Normal    Volume:  Normal   Mood:    Anxious  Depressed  \"A little bit of confidence\"  Affect:    Appropriate   Thought Content:   Clear and Safety denies any current safety concerns including suicidal ideation, self-harm, and homicidal ideation  Thought Form:  Coherent  Logical     Insight:    Good     Plan:   Safety Plan: No current safety concerns identified.  Recommended that patient call 911 or go to the local ED should there be a change in any of these risk factors.   Barriers to treatment: None identified  Patient Contracts (see media tab):  None  Substance Use: Not addressed in session   Continue or Discharge: Patient will continue in 55+ Program (55+) as planned. Patient is likely to benefit from learning and using skills as they work toward the goals identified in their treatment plan.      Roselyn Singh, [facility]  November 1, 2024  "

## 2024-11-04 ENCOUNTER — HOSPITAL ENCOUNTER (OUTPATIENT)
Dept: BEHAVIORAL HEALTH | Facility: CLINIC | Age: 59
Discharge: HOME | End: 2024-11-04
Attending: PSYCHIATRY & NEUROLOGY
Payer: COMMERCIAL

## 2024-11-04 PROCEDURE — 90853 GROUP PSYCHOTHERAPY: CPT

## 2024-11-04 PROCEDURE — 90853 GROUP PSYCHOTHERAPY: CPT | Performed by: SOCIAL WORKER

## 2024-11-04 NOTE — GROUP NOTE
Psychotherapy Group Note    PATIENT'S NAME: Craig M Wilfahrt  MRN:   [id number]  :   1965  ACCT. NUMBER: [id number]  DATE OF SERVICE: 24  START TIME:  2:00 PM  END TIME:  2:50 PM  FACILITATOR: Cha Ware LSW  TOPIC: MH EBP Group: Emotions Management  [facility] Mental Health Outpatient Programs  TRACK: IOP/DT 3    NUMBER OF PARTICIPANTS: 7    Summary of Group / Topics Discussed:  Emotions Management: Anger: Patients explored and shared personal experiences associated with feelings of anger.  Group explored how these feelings develop, what they mean to each individual, and how to increase acceptance and usefulness of these feelings.  Discussed anger as a  secondary  emotion and reviewed ways to manage anger and challenge associated cognitive distortions. Group members worked to contextualize these concepts and promote healing.     Patient Session Goals / Objectives:  Discuss and review definitions and personal views/experiences with anger  Explore how feelings of anger impact functioning  Understand and practice strategies to manage difficult emotions and move towards healing  Demonstrate understanding of the feelings of anger  Verbalize how these emotions have impacted their lives/functioning  Verbalize of knowledge gained and possible interventions to manage feelings      Patient Participation / Response:  Fully participated with the group by sharing personal reflections / insights and openly received / provided feedback with other participants.    Demonstrated understanding of topics discussed through group discussion and participation, Expressed understanding of the relevance / importance of emotions management skills at distressing times in life, Self-aware of experiences with difficult emotions, and strategies to employ to manage them, Demonstrated knowledge of when to consider applying a variety of emotions management skills in daily life, Demonstrated understanding and  practice strategies to manage difficult emotions and move towards healing, and Identified barriers to applying emotions management strategies    Treatment Plan:  Patient has a current master individualized treatment plan.  See Epic treatment plan for more information.    GAURAV Menard

## 2024-11-04 NOTE — GROUP NOTE
Psychoeducation Group Note    PATIENT'S NAME: Craig M Wilfahrt  MRN:   [id number]  :   1965  ACCT. NUMBER: [id number]  DATE OF SERVICE: 24  START TIME:  3:00 PM  END TIME:  3:50 PM  FACILITATOR: Shannan Hays LICSW; Annabella Manjarrez RN  TOPIC:  Wellness Group: Brain Health  [facility] Mental Health Outpatient Programs  TRACK: IOP/DT 3 and IOP 6     NUMBER OF PARTICIPANTS: 7 from IOP/DT 3 and 2 from IOP 6    NUMBER OF PARTICIPANTS: 9    Summary of Group / Topics Discussed:  Brain Health:  Pathophysiology of stress and anxiety: Patients were educated on the difference between stress, chronic stress, and anxiety. The anatomy and pathophysiology of the body/brain were reviewed to illustrate the immediate effects of stress/anxiety in the body and the long term effects and increased risks for chronic disease that come from unmanaged stress/anxiety. Self-coping strategies to manage symptoms of stress were reviewed and pharmacologic, psychotherapeutic, and complementary treatment options were discussed.    Patient Session Goals / Objectives:  Described the differences between stress and anxiety and how the body responds to it  Listed the long term effects and increased risks for chronic disease that can arise from unmanaged stress/anxiety  Identified and described pharmacologic, psychotherapeutic, and complementary treatment options      Patient Participation / Response:  Fully participated with the group by sharing personal reflections / insights and openly received / provided feedback with other participants.    Demonstrated understanding of topics discussed through group discussion and participation and Verbalized understanding of brain health topic    Treatment Plan:  Patient has a current master individualized treatment plan.  See Epic treatment plan for more information.    BETTIE Jasso

## 2024-11-04 NOTE — GROUP NOTE
"Process Group Note    PATIENT'S NAME: Craig M Wilfahrt  MRN:   [id number]  :   1965  ACCT. NUMBER: [id number]  DATE OF SERVICE: 24  START TIME:  1:00 PM  END TIME:  1:50 PM  FACILITATOR: Roselyn Singh LICSW  TOPIC:  Process Group    Diagnoses:  DSM5 Diagnosis/es:      ICD-10-CM     1. MDD (major depressive disorder), recurrent episode, moderate (H)  F33.1        [facility] Mental Health Outpatient Programs  TRACK: IOP/DT 3 55+    NUMBER OF PARTICIPANTS: 7        Data:    Session content: At the start of this group, patients were invited to check in by identifying themselves, describing their current emotional status, and identifying issues to address in this group.   Area(s) of treatment focus addressed in this session included Symptom Management, Personal Safety, and Community Resources/Discharge Planning.  Reported mood is \"tired, a good tired\".  He reflected positively on his weekend consisting of a 1:1 time with his wife and son.  He reported a \"productive day\" yesterday.   Client denied safety concerns, including SI and SIB. Client denies any chemical use.  Client is taking medications as prescribed. Client's goal is \"have lunch with a friend tomorrow, vote\".  Client reported plans to use the following skills: mindful movement (stretching). Client is grateful for the events of the weekend. Peers offered supportive feedback and validation.      Therapeutic Interventions/Treatment Strategies:  Psychotherapist offered support, feedback and validation and reinforced use of skills. Treatment modalities used include Cognitive Behavioral Therapy and Dialectical Behavioral Therapy. Interventions include Coping Skills: Assisted patient in understanding the purpose of planning / creating / participating / sharing in positive experiences and Mindfulness: Facilitated discussion of when/how to use mindfulness skills to benefit general health, mental health symptoms, and " "stressors.    Assessment:    Patient response:   Patient responded to session by accepting feedback, giving feedback, listening, focusing on goals, being attentive, and accepting support    Possible barriers to participation / learning include: and no barriers identified    Health Issues:   None reported       Substance Use Review:   Substance Use: No active concerns identified.    Mental Status/Behavioral Observations  Appearance:   Appropriate   Eye Contact:   Good   Psychomotor Behavior: Normal   Attitude:   Cooperative  Interested Friendly Pleasant  Orientation:   All  Speech   Rate / Production: Normal    Volume:  Normal   Mood:    \"Tired\"  Affect:    Appropriate   Thought Content:   Clear and Safety denies any current safety concerns including suicidal ideation, self-harm, and homicidal ideation  Thought Form:  Coherent  Logical     Insight:    Good     Plan:   Safety Plan: No current safety concerns identified.  Recommended that patient call 911 or go to the local ED should there be a change in any of these risk factors.   Barriers to treatment: None identified  Patient Contracts (see media tab):  None  Substance Use: Not addressed in session   Continue or Discharge: Patient will continue in 55+ Program (55+) as planned. Patient is likely to benefit from learning and using skills as they work toward the goals identified in their treatment plan.      Roselyn Singh, [facility]  November 4, 2024  "

## 2024-11-06 ENCOUNTER — HOSPITAL ENCOUNTER (OUTPATIENT)
Dept: BEHAVIORAL HEALTH | Facility: CLINIC | Age: 59
Discharge: HOME | End: 2024-11-06
Attending: PSYCHIATRY & NEUROLOGY
Payer: COMMERCIAL

## 2024-11-06 PROCEDURE — 90853 GROUP PSYCHOTHERAPY: CPT

## 2024-11-06 PROCEDURE — 90853 GROUP PSYCHOTHERAPY: CPT | Performed by: SOCIAL WORKER

## 2024-11-06 NOTE — GROUP NOTE
Psychotherapy Group Note    PATIENT'S NAME: Craig M Wilfahrt  MRN:   [id number]  :   1965  ACCT. NUMBER: [id number]  DATE OF SERVICE: 24  START TIME:  2:00 PM  END TIME:  2:50 PM  FACILITATOR: Cha Ware LSW  TOPIC: MH EBP Group: Coping Skills  [facility] Adult Mental Health Outpatient Programs  TRACK: Iop/DT 3 55+    NUMBER OF PARTICIPANTS: 7    Summary of Group / Topics Discussed:  Coping Skills: Additional Coping Skills:  Patients discussed and practiced humor.  Reviewed the benefits of applying humor as a coping strategies.  Patients explored how humor strategies might be applied to daily stressors or distressing situations.    Patient Session Goals / Objectives:  Understand the purpose and benefits of applying humor coping strategies  Identified that humor can be used to promote healing, build connections, and support mental health  Reflected on how humor plays a role in daily life.  Address barriers to utilizing coping skills when in distress.      Patient Participation / Response:  Moderately participated, sharing some personal reflections / insights and adequately adequately received / provided feedback with other participants.    Demonstrated understanding of topics discussed through group discussion and participation    Treatment Plan:  Patient has a current master individualized treatment plan.  See Epic treatment plan for more information.    GAURAV Menard

## 2024-11-06 NOTE — GROUP NOTE
"Process Group Note    PATIENT'S NAME: Craig M Wilfahrt  MRN:   [id number]  :   1965  ACCT. NUMBER: [id number]  DATE OF SERVICE: 24  START TIME:  1:00 PM  END TIME:  1:50 PM  FACILITATOR: Roselyn Singh LICSW  TOPIC:  Process Group    Diagnoses:  DSM5 Diagnosis/es:      ICD-10-CM     1. MDD (major depressive disorder), recurrent episode, moderate (H)  F33.1        [facility] Mental [facility] Outpatient Programs  TRACK: IOP/DT 3 55+    NUMBER OF PARTICIPANTS: 7        Data:    Session content: At the start of this group, patients were invited to check in by identifying themselves, describing their current emotional status, and identifying issues to address in this group.   Area(s) of treatment focus addressed in this session included Symptom Management, Personal Safety, and Community Resources/Discharge Planning.  Reported mood is \"tired\", anxious.  He reflected positively on his lunch with a friend.  He scheduled biweekly lunches with his friend.  He hopes to get back to the gym (with this friend) next week.  He used processing time to request cope ahead strategies.  He reports it had been two years since he's attended the gym.  Client denied safety concerns, including SI and SIB. Client denies any chemical use.  Client is taking medications as prescribed. Client's goal is \"put stuff away for the winter\"; gather hunting supplies.  He reports being absent this Friday and Monday to go hunting.  Client reported plans to use the following coping skills: mindful stretching.  Client is grateful for the election. Peers offered supportive feedback and validation.    Therapeutic Interventions/Treatment Strategies:  Psychotherapist offered support, feedback and validation and reinforced use of skills. Treatment modalities used include Cognitive Behavioral Therapy and Dialectical Behavioral Therapy. Interventions include Behavioral Activation: Explored how behaviors effect mood and interact with " thoughts and feelings and Encouraged strategies to reduce individual procrastination and increase motivation by increasing goal-directed activities to enhance mood and reduce symptoms. and Coping Skills: Assisted patient in understanding the purpose of planning / creating / participating / sharing in positive experiences.    Assessment:    Patient response:   Patient responded to session by accepting feedback, giving feedback, listening, focusing on goals, being attentive, and accepting support    Possible barriers to participation / learning include: and no barriers identified    Health Issues:   None reported       Substance Use Review:   Substance Use: No active concerns identified.    Mental Status/Behavioral Observations  Appearance:   Appropriate   Eye Contact:   Good   Psychomotor Behavior: Normal   Attitude:   Cooperative  Interested Friendly Pleasant  Orientation:   All  Speech   Rate / Production: Normal    Volume:  Normal   Mood:    Anxious   Affect:    Appropriate   Thought Content:   Clear and Safety denies any current safety concerns including suicidal ideation, self-harm, and homicidal ideation  Thought Form:  Coherent  Logical     Insight:    Good     Plan:   Safety Plan: No current safety concerns identified.  Recommended that patient call 911 or go to the local ED should there be a change in any of these risk factors.   Barriers to treatment: None identified  Patient Contracts (see media tab):  None  Substance Use: Not addressed in session   Continue or Discharge: Patient will continue in 55+ Program (55+) as planned. Patient is likely to benefit from learning and using skills as they work toward the goals identified in their treatment plan.      Roselyn Singh, SONIYASW  November 6, 2024

## 2024-11-06 NOTE — GROUP NOTE
Psychoeducation Group Note    PATIENT'S NAME: Craig M Wilfahrt  MRN:   [id number]  :   1965  ACCT. NUMBER: [id number]  DATE OF SERVICE: 24  START TIME:  3:00 PM  END TIME:  3:50 PM  FACILITATOR: Shannan Hays LICSW; Shaunna Harman OTR  TOPIC: MH Life Skills Group: Resiliency Development  [facility] Mental Health Outpatient Programs  TRACK: IOP 5    NUMBER OF PARTICIPANTS: 7    Summary of Group / Topics Discussed:  Resiliency Development:  Resiliency Development: ABCs of Coping: Reviewed signs of stress including physical changes, behavior changes, and changes to thoughts and emotions.  Provided education on the benefits of developing a robust coping plan to help manage distress and regulate emotions.  Discussed the importance of having easy access to this plan in times of increased symptoms.  Brainstormed with group members to identify several coping skills for each letter of the alphabet in order to develop a list that includes variety and depth.  Prompted patients to identify at least one new skill from the list they are willing to try.         Patient Session Goals / Objectives:   Review the signs and symptoms of stress and establish a need for stress management strategies.   Identify benefits of having a robust coping plan to help manage distress and regulate emotions.   Develop a list of coping skills to promote self-regulation.   Establish a plan for practice of these skills in their own environments.       Patient Participation / Response:  Fully participated with the group by sharing personal reflections / insights and openly received / provided feedback with other participants.    Verbalized understanding of content    Treatment Plan:  Patient has a current master individualized treatment plan.  See Epic treatment plan for more information.    BETTIE Jasso

## 2024-11-07 ENCOUNTER — HOSPITAL ENCOUNTER (OUTPATIENT)
Dept: BEHAVIORAL HEALTH | Facility: CLINIC | Age: 59
Discharge: HOME | End: 2024-11-07
Attending: PSYCHIATRY & NEUROLOGY
Payer: COMMERCIAL

## 2024-11-07 PROCEDURE — 90853 GROUP PSYCHOTHERAPY: CPT

## 2024-11-07 NOTE — PROGRESS NOTES
Outpatient Mental Health Program Discharge Summary / Instructions - Adult       PATIENT'S NAME: Craig M Wilfahrt   MRN:   [id number]  :   1965    PROGRAM PARTICIPATION: 55+ Program (55+) Track: IOP/DT 3    ADMISSION DATE: 2024  DISCHARGE DATE: 2024      Reason for Discharge:   Completed treatment: factors leading to admission have been addressed to the extent that the patient can be safely transitioned to a less intensive level of care.     Diagnosis:   DSM5 Diagnosis/es:      ICD-10-CM     1. MDD (major depressive disorder), recurrent episode, moderate (H)  F33.1        Medications: (include name, dose, and frequency)  Per provider: Dr. Nellie Vázquez    Current Outpatient Medications   Medication Sig Dispense Refill    atorvastatin (LIPITOR) 20 MG tablet Take 20 mg by mouth daily      busPIRone (BUSPAR) 15 MG tablet Take 15 mg by mouth daily.      cariprazine (VRAYLAR) 3 MG capsule Take 1 capsule (3 mg) by mouth daily. 30 capsule 0    desvenlafaxine (PRISTIQ) 100 MG 24 hr tablet Take 1 tablet (100 mg) by mouth daily. 30 tablet 0    lisinopril (ZESTRIL) 10 MG tablet Take 20 mg by mouth daily.       No current facility-administered medications for this visit.         DISCHARGE PLAN / RECOMMENDATIONS TO MANAGE SYMPTOMS AND PREVENT RELAPSE:    Take medications as prescribed.  Medication Management: Provider: Shannon Cabral through [place]  Next Appointment:     Follow up with your providers and appointments.  Next Level of Care / Frequency:   Individual therapy: Mati Singh Next Appointment:     Follow up with the following resources and/or community supports:   Health Club, Lunch with a friend every 2 weeks    Consider [facility] support groups.   Listings for the free support groups are at https://[facility] Centerimn.org/support/[facility]-support-groups/ (779) 629-6969 free support groups for numerous group therapy topics & issues including: individual, family, couples, LGBTQ,  "Young Adults, parenting, anxiety, grief and loss, depression, and others    [facility] It Support Groups  Listings for the free support groups for men are at https://www.faceitfoundation.org/support-offerings/support-groups/  (113) 457-3234    [facility] Warmline https://mentalhealthmn.org/support/[place]-warmline/ Open Monday-Saturday, 12 PM to 10 PM  [phone]; Toll Free 855-WARMLINE or 041.968.8777 or text  Support  to 31917    The Peer Support Connection Warmline of [place] https://mnwitw.org/[place]warmline  Available from 5pm - 9am  (7 days a week/365 days a year) 1-175.273.1702    Report symptoms and significant changes to your care team: including any safety concerns, thoughts of suicide or homicide, changes in sleep, increased confusion, mood getting worse, feeling more aggressive or increased isolation    Use coping skills:  Practice Mindfulness, Reality Checks, Opposite to Emotion, Identify thought distortions & feelings, distractions, express self, keep appointments, take medications daily, journal, use gratitude, self-compassion, Wise Mind, Use of the 5 Senses, grounding, Talk to someone you trust at least one time weekly, set boundaries and say \"no\", be assertive, act opposite of negative feelings, accept challenges with a positive attitude, exercise at least three times per week for 30 minutes,  get enough sleep, eat healthy foods, get into a good routine    Do not use illicit drugs.    Avoid alcohol.    PERSONAL SAFETY / CRISIS MANAGEMENT:  Follow your individualized Safety Coping Plan.  Report increased symptoms and safety concerns to your care team.  Call 911 or go to your nearest emergency department.  Use the crisis resources listed below:    Crisis Resources:  Suicide Prevention Lifeline: 0-903-276-TALK (7271)  Crisis Text Line Service (available 24 hours a day, 7 days a week): Text [place] to 058604  Call  **CRISIS (568698) from a cell phone to talk to a team of professionals who can help " you.    Crisis Services By County: Phone Number:   [place]     [phone]   [place]    [phone]   [place] ([place])    [phone]   [place]    [phone]   [place]                                                    [phone] (after hours)   [place]   [phone]   [place]     [phone]       The above discharge plan and recommendations were created to help you manage your mental health and to improve your overall functioning and well-being.     Not following the above recommendations may result in an increase of symptoms, potential safety risks and a need for increased services.    We appreciate the opportunity to work with you and sincerely thank you for choosing [facility].        Your treatment team: Patrick Fine MD; Roselyn Singh LICYURI;  [facility]ann[facility] Gonzalez [facility]; Cha Ware YURI; WESTON Murphy/L; Annabella Chappell RN        Patient Signature: ______________________________________________________ (signed copy uploaded to chart) Date:___________

## 2024-11-07 NOTE — GROUP NOTE
"Process Group Note    PATIENT'S NAME: Craig M Wilfahrt  MRN:   [id number]  :   1965  ACCT. NUMBER: [id number]  DATE OF SERVICE: 24  START TIME:  1:00 PM  END TIME:  1:50 PM  FACILITATOR: Roselyn Singh LICSW  TOPIC:  Process Group    Diagnoses:  DSM5 Diagnosis/es:      ICD-10-CM     1. MDD (major depressive disorder), recurrent episode, moderate (H)  F33.1        [facility] Mental Health Outpatient Programs  TRACK: IOP/DT 3 55+    NUMBER OF PARTICIPANTS: 7        Data:    Session content: At the start of this group, patients were invited to check in by identifying themselves, describing their current emotional status, and identifying issues to address in this group.   Area(s) of treatment focus addressed in this session included Symptom Management, Personal Safety, and Community Resources/Discharge Planning.  Reported mood is \"rushed\".  He reported using the 42776 grounding technique on his way to group today to reduce anxiety on the road.   Client denied safety concerns, including SI and SIB. Client's goal is prepare for his hunting trip on Friday.  Client reported plans to use the following coping skills: grounding. Client is grateful for the weather. Peers offered supportive feedback and validation.    Therapeutic Interventions/Treatment Strategies:  Psychotherapist offered support, feedback and validation and reinforced use of skills. Treatment modalities used include Cognitive Behavioral Therapy and Dialectical Behavioral Therapy. Interventions include Coping Skills: Promoted understanding of how and when to apply grounding strategies to reduce distress and increase presence in the moment and Assisted patient in understanding the purpose of planning / creating / participating / sharing in positive experiences.    Assessment:    Patient response:   Patient responded to session by accepting feedback, giving feedback, listening, focusing on goals, being attentive, and accepting " support    Possible barriers to participation / learning include: and no barriers identified    Health Issues:   None reported       Substance Use Review:   Substance Use: No active concerns identified.    Mental Status/Behavioral Observations  Appearance:   Appropriate   Eye Contact:   Good   Psychomotor Behavior: Normal   Attitude:   Cooperative  Interested Friendly Pleasant  Orientation:   All  Speech   Rate / Production: Normal    Volume:  Normal   Mood:    Anxious   Affect:    Appropriate   Thought Content:   Clear and Safety denies any current safety concerns including suicidal ideation, self-harm, and homicidal ideation  Thought Form:  Coherent  Logical     Insight:    Good     Plan:   Safety Plan: No current safety concerns identified.  Recommended that patient call 911 or go to the local ED should there be a change in any of these risk factors.   Barriers to treatment: None identified  Patient Contracts (see media tab):  None  Substance Use: Not addressed in session   Continue or Discharge: Patient will continue in 55+ Program (55+) as planned. Patient is likely to benefit from learning and using skills as they work toward the goals identified in their treatment plan.      Roselyn Singh [facility]  November 7, 2024

## 2024-11-07 NOTE — GROUP NOTE
Psychoeducation Group Note    PATIENT'S NAME: Craig M Wilfahrt  MRN:   [id number]  :   1965  ACCT. NUMBER: [id number]  DATE OF SERVICE: 24  START TIME:  3:00 PM  END TIME:  3:50 PM  FACILITATOR: Cha Ware LSW; Annabella Manjarrez RN  TOPIC:  Wellness Group: Brain Health  [facility] Mental Health Outpatient Programs  TRACK: IOP/DT 3 55+    NUMBER OF PARTICIPANTS: 6    Summary of Group / Topics Discussed:  Brain Health:  Pathophysiology of stress and anxiety: Patients were educated on the difference between stress, chronic stress, and anxiety. The anatomy and pathophysiology of the body/brain were reviewed to illustrate the immediate effects of stress/anxiety in the body and the long term effects and increased risks for chronic disease that come from unmanaged stress/anxiety. Self-coping strategies to manage symptoms of stress were reviewed and pharmacologic, psychotherapeutic, and complementary treatment options were discussed.    Patient Session Goals / Objectives:  Described the differences between stress and anxiety and how the body responds to it  Listed the long term effects and increased risks for chronic disease that can arise from unmanaged stress/anxiety  Identified and described pharmacologic, psychotherapeutic, and complementary treatment options      Patient Participation / Response:  Moderately participated, sharing some personal reflections / insights and adequately adequately received / provided feedback with other participants.    Demonstrated understanding of topics discussed through group discussion and participation and Identified / Expressed personal readiness to practice skills    Treatment Plan:  Patient has a current master individualized treatment plan.  See Epic treatment plan for more information.    GAURAV Menard

## 2024-11-07 NOTE — GROUP NOTE
"Process Group Note     PATIENT'S NAME:    Craig M Wilfahrt  MRN:                           [id number]  :                           1965  ACCT. NUMBER:       [id number]  DATE OF SERVICE:  24  START TIME:  1:00 PM  END TIME:  1:50 PM  FACILITATOR: Roselyn Singh LICSW  TOPIC:  Process Group     Diagnoses:  DSM5 Diagnosis/es:      ICD-10-CM     1. MDD (major depressive disorder), recurrent episode, moderate (H)  F33.1           [facility] Mental Health Outpatient Programs  TRACK: IOP/DT 3 55+     NUMBER OF PARTICIPANTS: 7           Data:     Session content: At the start of this group, patients were invited to check in by identifying themselves, describing their current emotional status, and identifying issues to address in this group.   Area(s) of treatment focus addressed in this session included Symptom Management, Personal Safety, and Community Resources/Discharge Planning.  Reported mood is \"rushed\".  He reported using the 74474 grounding technique on his way to group today to reduce anxiety on the road.   Client denied safety concerns, including SI and SIB. Client's goal is prepare for his hunting trip on Friday.  Client reported plans to use the following coping skills: grounding. Client is grateful for the weather. Peers offered supportive feedback and validation.     Therapeutic Interventions/Treatment Strategies:  Psychotherapist offered support, feedback and validation and reinforced use of skills. Treatment modalities used include Cognitive Behavioral Therapy and Dialectical Behavioral Therapy. Interventions include Coping Skills: Promoted understanding of how and when to apply grounding strategies to reduce distress and increase presence in the moment and Assisted patient in understanding the purpose of planning / creating / participating / sharing in positive experiences.     Assessment:     Patient response:   Patient responded to session by accepting feedback, giving " feedback, listening, focusing on goals, being attentive, and accepting support     Possible barriers to participation / learning include: and no barriers identified     Health Issues:              None reported                  Substance Use Review:              Substance Use: No active concerns identified.     Mental Status/Behavioral Observations  Appearance:                            Appropriate   Eye Contact:                           Good   Psychomotor Behavior:          Normal   Attitude:                                   Cooperative  Interested Friendly Pleasant  Orientation:                             All  Speech              Rate / Production:       Normal               Volume:                       Normal   Mood:                                      Anxious   Affect:                                      Appropriate   Thought Content:                    Clear and Safety denies any current safety concerns including suicidal ideation, self-harm, and homicidal ideation  Thought Form:                        Coherent  Logical     Insight:                                     Good      Plan:   Safety Plan: No current safety concerns identified.  Recommended that patient call 911 or go to the local ED should there be a change in any of these risk factors.   Barriers to treatment: None identified  Patient Contracts (see media tab):  None  Substance Use: Not addressed in session   Continue or Discharge: Patient will continue in 55+ Program (55+) as planned. Patient is likely to benefit from learning and using skills as they work toward the goals identified in their treatment plan.        BETTIE Flores              November 7, 2024            BETTIE Flores  November 7, 2024

## 2024-11-07 NOTE — GROUP NOTE
Psychotherapy Group Note    PATIENT'S NAME: Craig M Wilfahrt  MRN:   [id number]  :   1965  ACCT. NUMBER: [id number]  DATE OF SERVICE: 24  START TIME:  2:00 PM  END TIME:  2:50 PM  FACILITATOR: Roselyn Singh LICSW  TOPIC: MH EBP Group: Emotions Management  [facility] Mental Health Outpatient Programs  TRACK: IOP/DT 3 55+    NUMBER OF PARTICIPANTS: 7    Summary of Group / Topics Discussed:  Emotions Management: Model of Emotions: Patients were introduced to the cyclical model of emotions.  Explored emotions are shaped by different events and one s interpretation of events.  Group discussed how emotions begin with an event, followed by one s interpretation, followed by associated feelings.  Discussion included a review of personal urges and actions that can/do follow an emotional experience in the patient s life, and the end results.    Patient Session Goals / Objectives:  Demonstrate understanding of types various emotions.  Identify and discuss specific emotions and when they occur; understand triggers.  Identify individual emotions and physical sensations that accompany them.  Discuss urges and actions, and how to influence the intensity of emotional reactions and disrupt the cycle.    Discuss barriers to emotional regulation.  Choose 1-2 strategies to assist with emotional response to potentially distressing situations.      Patient Participation / Response:  Moderately participated, sharing some personal reflections / insights and adequately adequately received / provided feedback with other participants.    Demonstrated understanding of topics discussed through group discussion and participation, Expressed understanding of the relevance / importance of emotions management skills at distressing times in life, Self-aware of experiences with difficult emotions, and strategies to employ to manage them, Demonstrated knowledge of when to consider applying a variety of emotions management  skills in daily life, Demonstrated understanding and practice strategies to manage difficult emotions and move towards healing, and Identified barriers to applying emotions management strategies    Treatment Plan:  Patient has a current master individualized treatment plan.  See Epic treatment plan for more information.    SONIYA FloresSW

## 2024-11-08 NOTE — PROGRESS NOTES
Intensive Outpatient Program    Physician Recertification of Medical Necessity    Patient Legal Name: Craig M Wilfahrt   Patient Preferred Name: Lam  Patient : 1965  Patient MRN: [id number]    Attending physician: Patrick Fine MD    Recertification #1 from date 2024 through date 2025    I certify the above-named patient would require partial hospitalization care if intensive outpatient services were not provided and that the patient requires such IOP services for a minimum of 9 hours per week. These services are provided under the care and supervision of a physician and under a written, individualized plan of treatment authorized and approved by the physician.    Patient's response to the therapeutic interventions provided by IOP:  Improved coping, insight      Patient's psychiatric symptoms that continue to place the patient at risk of need for higher level of care:  Anticipate discharge today, extending 1 day beyond initial 60 day certification       Treatment Goals for coordination of services to facilitate discharge from the intensive outpatient program:    Goal # 1: see above      Patrick Fine MD on 2024 at 8:39 AM

## 2024-11-13 ENCOUNTER — HOSPITAL ENCOUNTER (OUTPATIENT)
Dept: BEHAVIORAL HEALTH | Facility: CLINIC | Age: 59
End: 2024-11-13
Attending: PSYCHIATRY & NEUROLOGY
Payer: COMMERCIAL

## 2024-11-13 PROCEDURE — 90853 GROUP PSYCHOTHERAPY: CPT

## 2024-11-13 NOTE — GROUP NOTE
Psychoeducation Group Note    PATIENT'S NAME: Craig M Wilfahrt  MRN:   [id number]  :   1965  ACCT. NUMBER: [id number]  DATE OF SERVICE: 24  START TIME:  3:00 PM  END TIME:  3:50 PM  FACILITATOR: Cha Ware LSW; Floyd Ferraro OTR  TOPIC: MH Life Skills Group: Lifestyle Balance and Structure  [facility]  TRACK: IOP/DT 3 55+    NUMBER OF PARTICIPANTS: 6    Summary of Group / Topics Discussed:  Lifestyle Balance and Structure: Patients were introduced to the importance of leisure, self-care, productivity, and social participation in support of mental health management by exploring a balanced lifestyle.  Patients identified how they spend their time and skills to bring this into better balance.  Patients identified a self-care, productive, leisure, and social participation activity that they would like to complete this week. Patients practiced the skill of planning to identify when and where they could complete these activities. Patients shared with their peers their plans for the week. Validation, support, and guidance provided throughout group.     Patient Session Goals / Objectives:  Discussed the importance of life balance, structure, and routine.    Identified and planned 4 activities in the areas of leisure, productivity, social participation and self-care.   Self-reflected on the benefits of structure and routine in their life.      Patient Participation / Response:  Fully participated with the group by sharing personal reflections / insights and openly received / provided feedback with other participants.    Demonstrated understanding of content through giving examples of leisure, productivity, self-care, and social participation activities.     Treatment Plan:  Patient has a current master individualized treatment plan.  See Epic treatment plan for more information.    GAURAV Menard

## 2024-11-13 NOTE — GROUP NOTE
Psychotherapy Group Note    PATIENT'S NAME: Craig M Wilfahrt  MRN:   [id number]  :   1965  ACCT. NUMBER: [id number]  DATE OF SERVICE: 24  START TIME:  2:00 PM  END TIME:  2:50 PM  FACILITATOR: Roselyn Singh LICSW  TOPIC: MH EBP Group: Cognitive Restructuring  [facility]  TRACK: IOP/DT 3 55+    NUMBER OF PARTICIPANTS: 6    Summary of Group / Topics Discussed:  Cognitive Restructuring: Distortions: Patients received an overview of how to identify common cognitive distortions. Patients will explore alternatives to cognitive distortions and practice challenging their negative thought patterns. The goal is to help patients target modify ineffective thought patterns.     Patient Session Goals / Objectives:  Familiarized self with ineffective / unhealthy thoughts and how they develop.    Explored impact of ineffective thoughts / distortions on mood and activity  Formulated new neutral/positive alternatives to challenge less helpful / ineffective thoughts.  Practiced and plan to apply in daily life             Patient Participation / Response:  Fully participated with the group by sharing personal reflections / insights and openly received / provided feedback with other participants.    Demonstrated understanding of topics discussed through group discussion and participation, Expressed understanding of the relationship between behaviors, thoughts, and feelings, Demonstrated knowledge of personal thought patterns and how they impact their mood and behavior., Identified barriers to changing thought patterns, and Identified / Expressed personal readiness to practice CBT    Treatment Plan:  Patient has a current master individualized treatment plan.  See Epic treatment plan for more information.    BETTIE Flores

## 2024-11-14 NOTE — GROUP NOTE
"Process Group Note    PATIENT'S NAME: Craig M Wilfahrt  MRN:   [id number]  :   1965  ACCT. NUMBER: [id number]  DATE OF SERVICE: 24  START TIME:  1:00 PM  END TIME:  1:50 PM  FACILITATOR: Roselyn Singh LICSW  TOPIC:  Process Group    Diagnoses:  DSM5 Diagnosis/es:      ICD-10-CM     1. MDD (major depressive disorder), recurrent episode, moderate (H)  F33.1        [facility]  TRACK: IOP/DT 3 55+    NUMBER OF PARTICIPANTS: 6        Data:    Session content: At the start of this group, patients were invited to check in by identifying themselves, describing their current emotional status, and identifying issues to address in this group.   Area(s) of treatment focus addressed in this session included Symptom Management, Personal Safety, and Community Resources/Discharge Planning.  Reported mood is \"relaxed, tired\". He reflected positively on his hunting trip this weekend, including the solitude while hunting and the quality time with family before and after. He hopes to get out a couple more times this hunting season. Client denied safety concerns, including SI and SIB. Client's goal is \"get organized with to dos at home\", including a list his daughter made for him.  Client reported plans to use the following coping skills: 05322 grounding.  Peers offered supportive feedback and validation.    He confirmed with writer that he would like to discharge on 2024.  He will be gone tomorrow for an annual physical exam.    Therapeutic Interventions/Treatment Strategies:  Psychotherapist offered support, feedback and validation and reinforced use of skills. Treatment modalities used include Cognitive Behavioral Therapy and Dialectical Behavioral Therapy. Interventions include Coping Skills: Promoted understanding of how and when to apply grounding strategies to reduce distress and increase presence in the moment and Assisted patient in understanding the " "purpose of planning / creating / participating / sharing in positive experiences.    Assessment:    Patient response:   Patient responded to session by accepting feedback, giving feedback, listening, focusing on goals, being attentive, and accepting support    Possible barriers to participation / learning include: and no barriers identified    Health Issues:   None reported       Substance Use Review:   Substance Use: No active concerns identified.    Mental Status/Behavioral Observations  Appearance:   Appropriate   Eye Contact:   Good   Psychomotor Behavior: Normal   Attitude:   Cooperative  Interested Friendly Pleasant  Orientation:   All  Speech   Rate / Production: Normal    Volume:  Normal   Mood:    \"Relaxed, tired\"  Affect:    Appropriate   Thought Content:   Clear and Safety denies any current safety concerns including suicidal ideation, self-harm, and homicidal ideation  Thought Form:  Coherent  Logical     Insight:    Good     Plan:   Safety Plan: No current safety concerns identified.  Recommended that patient call 911 or go to the local ED should there be a change in any of these risk factors.   Barriers to treatment: None identified  Patient Contracts (see media tab):  None  Substance Use: Not addressed in session   Continue or Discharge: Patient will continue in 55+ Program (55+) as planned. Patient is likely to benefit from learning and using skills as they work toward the goals identified in their treatment plan.      Roselyn Singh, [facility]  November 14, 2024  "

## 2024-11-15 ENCOUNTER — HOSPITAL ENCOUNTER (OUTPATIENT)
Dept: BEHAVIORAL HEALTH | Facility: CLINIC | Age: 59
End: 2024-11-15
Attending: PSYCHIATRY & NEUROLOGY
Payer: COMMERCIAL

## 2024-11-15 PROCEDURE — 90853 GROUP PSYCHOTHERAPY: CPT

## 2024-11-15 NOTE — GROUP NOTE
Psychotherapy Group Note    PATIENT'S NAME: Craig M Wilfahrt  MRN:   [id number]  :   1965  ACCT. NUMBER: [id number]  DATE OF SERVICE: 11/15/24  START TIME:  2:00 PM  END TIME:  2:50 PM  FACILITATOR: Roselyn Singh LICSW  TOPIC: MH EBP Group: Coping Skills  [facility]  TRACK: IOP/DT 3 55+    NUMBER OF PARTICIPANTS: 9    Summary of Group / Topics Discussed:  Coping Skills: Additional Coping Skills:  Patients discussed and practiced positive self-affirmations.  Reviewed the benefits of applying the aforementioned coping strategies.  Patients explored how these strategies might be applied to daily stressors or distressing situations.    Patient Session Goals / Objectives:  Understand the purpose and benefits of applying affirmation coping strategies  Created and practiced personal affirmations  Address barriers to utilizing coping skills when in distress.      Patient Participation / Response:  Fully participated with the group by sharing personal reflections / insights and openly received / provided feedback with other participants.    Demonstrated understanding of topics discussed through group discussion and participation, Expressed understanding of the relevance / importance of coping skills at distressing times in life, Demonstrated knowledge of when to consider using a variety of coping skills in daily life, Identified barriers to applying coping skills, Identified 2-3 positive coping strategies that have helped maintain / improve symptoms in the past, and Practiced 2-3 new coping skills in session    Treatment Plan:  Patient has a current master individualized treatment plan.  See Epic treatment plan for more information.    BETTIE Flores

## 2024-11-15 NOTE — GROUP NOTE
Psychoeducation Group Note    PATIENT'S NAME: Craig M Wilfahrt  MRN:   [id number]  :   1965  ACCT. NUMBER: [id number]  DATE OF SERVICE: 11/15/24  START TIME:  3:00 PM  END TIME:  3:50 PM  FACILITATOR: Ingrid Ricks LICSW; Floyd Ferraro OTR  TOPIC: MH Life Skills Group: Communication and Social Skills Development  [facility]  TRACK: IOP/DT 3    NUMBER OF PARTICIPANTS: 9    Summary of Group / Topics Discussed:  Communication and Social Skills Development: Social Participation: Facilitated discussion on the importance of social participation in their daily lives and identified areas that they are currently participating in socially. Patients discussed the benefits of social participation on their mental wellbeing and social values. Patients were provided with an opportunity to engage in a social leisure activity. Patients identified and discussed with peers and staff regarding their experience of practicing social skills (being assertive, setting boundaries, accepting positive feedback) and ways to stay connected with others. Validation, support, and feedback was provided throughout the group process.    Patient Session Goals / Objectives:   Identified strengths and opportunities for growth in the area of social participation.   Improved awareness of important aspects of social participation for mental wellbeing.   Engage with other peers for experiential learning of social leisure skills.   Practiced and reflected on how to generalize social participation skills in their everyday life.       Patient Participation / Response:  Fully participated with the group by sharing personal reflections / insights and openly received / provided feedback with other participants.    Verbalized understanding of content    Treatment Plan:  Patient has a current master individualized treatment plan.  See Epic treatment plan for more information.    BETTIE Hill

## 2024-11-15 NOTE — GROUP NOTE
"Process Group Note    PATIENT'S NAME: Craig M Wilfahrt  MRN:   [id number]  :   1965  ACCT. NUMBER: [id number]  DATE OF SERVICE: 11/15/24  START TIME:  1:00 PM  END TIME:  1:50 PM  FACILITATOR: Roselyn Singh LICSW  TOPIC:  Process Group    Diagnoses:  DSM5 Diagnosis/es:      ICD-10-CM     1. MDD (major depressive disorder), recurrent episode, moderate (H)  F33.1        [facility]  TRACK: IOP/DT 3 55+    NUMBER OF PARTICIPANTS: 9        Data:    Session content: At the start of this group, patients were invited to check in by identifying themselves, describing their current emotional status, and identifying issues to address in this group.   Area(s) of treatment focus addressed in this session included Symptom Management, Personal Safety, and Community Resources/Discharge Planning.  Reported mood is stable (\"pretty good\").  He reflected on his busy day yesterday completing tasks with his daughter.   Client denied safety concerns, including SI and SIB. Client's goal is prepare for his weekend (spending time with 12 family members at his son in [employer] football game).  Client reported plans to use the following coping skills: build positive experiences. 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 Client is grateful for a peer walking him to the cafeteria today. Peers offered supportive feedback and validation.    Therapeutic Interventions/Treatment Strategies:  Psychotherapist offered support, feedback and validation and reinforced use of skills. Treatment modalities used include Cognitive Behavioral Therapy and Dialectical Behavioral Therapy. Interventions include Behavioral Activation: Explored how behaviors effect mood and interact with thoughts and feelings and Encouraged strategies to reduce individual procrastination and increase motivation by increasing goal-directed activities to enhance mood and reduce symptoms. and " Coping Skills: Assisted patient in understanding the purpose of planning / creating / participating / sharing in positive experiences.    Assessment:    Patient response:   Patient responded to session by accepting feedback, giving feedback, listening, focusing on goals, being attentive, and accepting support    Possible barriers to participation / learning include: and no barriers identified    Health Issues:   None reported       Substance Use Review:   Substance Use: No active concerns identified.    Mental Status/Behavioral Observations  Appearance:   Appropriate   Eye Contact:   Good   Psychomotor Behavior: Normal   Attitude:   Cooperative  Interested Friendly Pleasant  Orientation:   All  Speech   Rate / Production: Normal    Volume:  Normal   Mood:    Normal  Affect:    Appropriate   Thought Content:   Clear and Safety denies any current safety concerns including suicidal ideation, self-harm, and homicidal ideation  Thought Form:  Coherent  Logical     Insight:    Good     Plan:   Safety Plan: No current safety concerns identified.  Recommended that patient call 911 or go to the local ED should there be a change in any of these risk factors.   Barriers to treatment: None identified  Patient Contracts (see media tab):  None  Substance Use: Not addressed in session   Continue or Discharge: Patient will continue in 55+ Program (55+) as planned. Patient is likely to benefit from learning and using skills as they work toward the goals identified in their treatment plan.      Roselyn Singh, [facility]  November 15, 2024

## 2024-11-18 ENCOUNTER — TELEPHONE (OUTPATIENT)
Dept: BEHAVIORAL HEALTH | Facility: CLINIC | Age: 59
End: 2024-11-18
Payer: COMMERCIAL

## 2024-11-18 ENCOUNTER — HOSPITAL ENCOUNTER (OUTPATIENT)
Dept: BEHAVIORAL HEALTH | Facility: CLINIC | Age: 59
End: 2024-11-18
Attending: PSYCHIATRY & NEUROLOGY
Payer: COMMERCIAL

## 2024-11-18 PROCEDURE — 90853 GROUP PSYCHOTHERAPY: CPT

## 2024-11-18 PROCEDURE — 90853 GROUP PSYCHOTHERAPY: CPT | Performed by: SOCIAL WORKER

## 2024-11-18 NOTE — GROUP NOTE
Psychotherapy Group Note    PATIENT'S NAME: Craig M Wilfahrt  MRN:   [id number]  :   1965  ACCT. NUMBER: [id number]  DATE OF SERVICE: 24  START TIME:  2:00 PM  END TIME:  2:50 PM  FACILITATOR: Roselyn Singh LICSW  TOPIC: MH EBP Group: Behavioral Activation  [facility]  TRACK: IOP/DT 3 55+    NUMBER OF PARTICIPANTS: 9    Summary of Group / Topics Discussed:  Behavioral Activation: The Change Process - Behavior Change: Patients explored the process and types of change, including but not limited to, theories of change, steps to making change, methods of changing behavior, and potential barriers.  Patients worked to identify what changes may benefit their daily lives, and work towards a plan to implement change.      Patient Session Goals / Objectives:  Demonstrate understanding of the change process.    Identify positive and negative behavioral patterns.  Make plans to track and implement changes and share experiences in group.  Identify personal barriers to change      Patient Participation / Response:  Fully participated with the group by sharing personal reflections / insights and openly received / provided feedback with other participants.    Demonstrated understanding of topics discussed through group discussion and participation, Expressed understanding of the relationship between behaviors, thoughts, and feelings, Shared experiences and challenges with making behavioral changes, and Identified barriers to change    Treatment Plan:  Patient has a current master individualized treatment plan.  See Epic treatment plan for more information.    BETTIE Flores

## 2024-11-18 NOTE — GROUP NOTE
Psychoeducation Group Note    PATIENT'S NAME: Craig M Wilfahrt  MRN:   [id number]  :   1965  ACCT. NUMBER: [id number]  DATE OF SERVICE: 24  START TIME:  3:00 PM  END TIME:  3:50 PM  FACILITATOR: Shannan Hyas LICSW; Annabella Manjarrez RN  TOPIC: MH Wellness Group: Health Maintenance  [facility]  TRACK: IOP/DT 3    NUMBER OF PARTICIPANTS: 9    Summary of Group / Topics Discussed:  Health Maintenance: Discharge planning/Community resources: Patients worked on completing an instructor-facilitated discharge planning activity. Discharge planning begins for all patients after admission. Competent discharge planning promotes a successful transition and decreases the likelihood of mental health relapse. In this group, all dimensions of wellness were reviewed to assess for needs/discharge readiness. These dimensions included: physical, emotional, occupational/productivity, environmental, social, spiritual, intellectual, and financial. Patients worked on completing/updating their discharge planning and identifying their treatment needs prior to time of discharge.     Patient Session Goals / Objectives:  Identified unmet treatment needs to accomplish before discharge  Completed all dimensions of the discharge planning packet  Participated in the planning process, make phone calls, set up appointments, got connected with community resources, followed up with treatment team as needed         Patient Participation / Response:  Fully participated with the group by sharing personal reflections / insights and openly received / provided feedback with other participants.    Demonstrated understanding of topics discussed through group discussion and participation and Verbalized understanding of health maintenance topic    Treatment Plan:  Patient has a current master individualized treatment plan.  See Epic treatment plan for more information.    Shannan Hays,  LICSW

## 2024-11-18 NOTE — GROUP NOTE
"Process Group Note    PATIENT'S NAME: Craig M Wilfahrt  MRN:   [id number]  :   1965  ACCT. NUMBER: [id number]  DATE OF SERVICE: 24  START TIME:  1:00 PM  END TIME:  1:50 PM  FACILITATOR: Roselyn Singh LICSW  TOPIC:  Process Group    Diagnoses:  DSM5 Diagnosis/es:      ICD-10-CM     1. MDD (major depressive disorder), recurrent episode, moderate (H)  F33.1        [facility]  TRACK: IOP/DT 3 55+    NUMBER OF PARTICIPANTS: 9        Data:    Session content: At the start of this group, patients were invited to check in by identifying themselves, describing their current emotional status, and identifying issues to address in this group.   Area(s) of treatment focus addressed in this session included Symptom Management, Personal Safety, and Community Resources/Discharge Planning.  Lam attended his last day of IOP/DT 3. Reported mood is \"happy and sad\".   Client denied safety concerns, including SI and SIB. Client's goal is prepare for a trip [place] to [place] his [place] for the winter.  Client reported plans to use the following coping skills: accepting help from a friend, wise mind. Client is proud and grateful for his weekend spent with family.  Writer and peers offered him best wishes.    Therapeutic Interventions/Treatment Strategies:  Psychotherapist offered support, feedback and validation and reinforced use of skills. Treatment modalities used include Cognitive Behavioral Therapy and Dialectical Behavioral Therapy. Interventions include Coping Skills: Discussed how the use of intentional \"in the moment\" actions can help reduce distress and Other: Explored with patient how life transitions may impact mental health and functioning .    Assessment:    Patient response:   Patient responded to session by accepting feedback, giving feedback, listening, focusing on goals, being attentive, and accepting support    Possible barriers to participation / learning " include: and no barriers identified    Health Issues:   None reported       Substance Use Review:   Substance Use: No active concerns identified.    Mental Status/Behavioral Observations  Appearance:   Appropriate   Eye Contact:   Good   Psychomotor Behavior: Normal   Attitude:   Cooperative  Interested Friendly Pleasant  Orientation:   All  Speech   Rate / Production: Normal    Volume:  Normal   Mood:    Sad  Happy  Affect:    Appropriate   Thought Content:   Clear and Safety denies any current safety concerns including suicidal ideation, self-harm, and homicidal ideation  Thought Form:  Coherent  Logical     Insight:    Good     Plan:   Safety Plan: No current safety concerns identified.  Recommended that patient call 911 or go to the local ED should there be a change in any of these risk factors.   Barriers to treatment: None identified  Patient Contracts (see media tab):  None  Substance Use: Not addressed in session   Continue or Discharge: Patient is being discharged today. See Treatment Plan and Discharge Summary.       BETTIE Flores  November 18, 2024

## 2024-11-18 NOTE — TELEPHONE ENCOUNTER
----- Message from Roselyn Singh sent at 2024  7:57 AM CST -----  Regardin addtl session needed  Scheduling Request    Patient Name: Craig Wilfahrt  Location of programming: [facility]  Start Date:   Group:  55+ IOP/DT 3 (M,W,Th, F 1-4pm)  Attending Provider (MD): Dr. Patrick Fine  Number of visits to be scheduled: 1  Duration of Appointment in minutes: 180  Visit Type: In person    Additional notes: Patient is discharging from IOP/DT 3 effective today 2024.  Please add one session for today and remove any future appointments from the GOPAL    BETTIE Flores

## 2025-01-25 NOTE — ADDENDUM NOTE
Admitting team notified of pt's most recent H&H and stool characteristics.    Encounter addended by: Cha Ware LSW on: 10/22/2024 2:29 PM   Actions taken: Clinical Note Signed

## 2025-07-19 ENCOUNTER — HEALTH MAINTENANCE LETTER (OUTPATIENT)
Age: 60
End: 2025-07-19